# Patient Record
Sex: MALE | Race: WHITE | NOT HISPANIC OR LATINO | Employment: UNEMPLOYED | URBAN - METROPOLITAN AREA
[De-identification: names, ages, dates, MRNs, and addresses within clinical notes are randomized per-mention and may not be internally consistent; named-entity substitution may affect disease eponyms.]

---

## 2020-03-13 ENCOUNTER — NURSE TRIAGE (OUTPATIENT)
Dept: ADMINISTRATIVE | Facility: CLINIC | Age: 60
End: 2020-03-13

## 2020-03-13 RX ORDER — FAMOTIDINE 20 MG/1
20 TABLET, FILM COATED ORAL 2 TIMES DAILY
COMMUNITY
Start: 2020-03-10 | End: 2020-03-16

## 2020-03-14 NOTE — TELEPHONE ENCOUNTER
"    Reason for Disposition   Taking Coumadin (warfarin) or other strong blood thinner, or known bleeding disorder (e.g., thrombocytopenia)    Additional Information   Negative: Shock suspected (e.g., cold/pale/clammy skin, too weak to stand, low BP, rapid pulse)   Negative: Passed out (i.e., lost consciousness, collapsed and was not responding)   Negative: Difficult to awaken or acting confused (e.g., disoriented, slurred speech)   Negative: [1] Vomiting AND [2] contains red blood or black ("coffee ground") material  (Exception: few red streaks in vomit that only happened once)   Negative: Sounds like a life-threatening emergency to the triager   Negative: SEVERE rectal bleeding (large blood clots; on and off, or constant bleeding)   Negative: SEVERE dizziness (e.g., unable to stand, requires support to walk, feels like passing out now)   Negative: [1] MODERATE rectal bleeding (small blood clots, passing blood without stool, or toilet water turns red) AND [2] more than once a day   Negative: Pale skin (pallor) of new onset or worsening   Negative: Tarry or jet black-colored stool (not dark green)   Negative: [1] Constant abdominal pain AND [2] present > 2 hours   Negative: Rectal foreign body (i.e., now or within past week;  inserted or swallowed)   Negative: High-risk adult (e.g., prior surgery on aorta, abdominal aortic aneurysm)    Protocols used: RECTAL BLEEDING-A-AH      Patient describes mild bleeding from around the anus. His fiance looked at the area and states she doesn't see anything. He states he feels a 'stinging sensation when he wipes". Discussed the possibility he may be dealing with an anal fissure, but because he takes blood thinners, I'd have to send him in to the ED for evaluation. He verbalized understanding.   "

## 2020-03-17 ENCOUNTER — ANESTHESIA (OUTPATIENT)
Dept: CARDIOLOGY | Facility: HOSPITAL | Age: 60
End: 2020-03-17
Payer: MEDICAID

## 2020-03-17 ENCOUNTER — HOSPITAL ENCOUNTER (OUTPATIENT)
Facility: HOSPITAL | Age: 60
Discharge: HOME | End: 2020-03-17
Attending: INTERNAL MEDICINE | Admitting: INTERNAL MEDICINE
Payer: MEDICAID

## 2020-03-17 ENCOUNTER — CLINICAL SUPPORT (OUTPATIENT)
Dept: CARDIOLOGY | Facility: HOSPITAL | Age: 60
End: 2020-03-17
Attending: INTERNAL MEDICINE
Payer: MEDICAID

## 2020-03-17 ENCOUNTER — ANESTHESIA EVENT (OUTPATIENT)
Dept: CARDIOLOGY | Facility: HOSPITAL | Age: 60
End: 2020-03-17
Payer: MEDICAID

## 2020-03-17 VITALS
HEART RATE: 67 BPM | HEIGHT: 68 IN | DIASTOLIC BLOOD PRESSURE: 72 MMHG | SYSTOLIC BLOOD PRESSURE: 151 MMHG | BODY MASS INDEX: 26.52 KG/M2 | WEIGHT: 175 LBS | RESPIRATION RATE: 20 BRPM | OXYGEN SATURATION: 97 % | TEMPERATURE: 98 F

## 2020-03-17 DIAGNOSIS — Z01.812 PRE-PROCEDURE LAB EXAM: ICD-10-CM

## 2020-03-17 DIAGNOSIS — I74.10 AORTIC THROMBUS: ICD-10-CM

## 2020-03-17 DIAGNOSIS — Q25.49 OTHER CONGENITAL MALFORMATIONS OF AORTA: ICD-10-CM

## 2020-03-17 LAB
ANION GAP SERPL CALC-SCNC: 12 MMOL/L (ref 8–16)
APTT PPP: 32.9 SEC (ref 23.6–33.3)
BASOPHILS # BLD AUTO: 0.07 K/UL (ref 0–0.2)
BASOPHILS NFR BLD: 1 % (ref 0–1.9)
BUN SERPL-MCNC: 22 MG/DL (ref 6–20)
CALCIUM SERPL-MCNC: 9.4 MG/DL (ref 8.7–10.5)
CHLORIDE SERPL-SCNC: 101 MMOL/L (ref 95–110)
CO2 SERPL-SCNC: 25 MMOL/L (ref 23–29)
CREAT SERPL-MCNC: 0.9 MG/DL (ref 0.5–1.4)
DIFFERENTIAL METHOD: ABNORMAL
EOSINOPHIL # BLD AUTO: 0.3 K/UL (ref 0–0.5)
EOSINOPHIL NFR BLD: 3.6 % (ref 0–8)
ERYTHROCYTE [DISTWIDTH] IN BLOOD BY AUTOMATED COUNT: 12.2 % (ref 11.5–14.5)
EST. GFR  (AFRICAN AMERICAN): >60 ML/MIN/1.73 M^2
EST. GFR  (NON AFRICAN AMERICAN): >60 ML/MIN/1.73 M^2
GLUCOSE SERPL-MCNC: 112 MG/DL (ref 70–110)
HCT VFR BLD AUTO: 38.4 % (ref 40–54)
HGB BLD-MCNC: 12.9 G/DL (ref 14–18)
IMM GRANULOCYTES # BLD AUTO: 0.02 K/UL (ref 0–0.04)
IMM GRANULOCYTES NFR BLD AUTO: 0.3 % (ref 0–0.5)
INR PPP: 1.3
LYMPHOCYTES # BLD AUTO: 1.9 K/UL (ref 1–4.8)
LYMPHOCYTES NFR BLD: 25.7 % (ref 18–48)
MCH RBC QN AUTO: 31.9 PG (ref 27–31)
MCHC RBC AUTO-ENTMCNC: 33.6 G/DL (ref 32–36)
MCV RBC AUTO: 95 FL (ref 82–98)
MONOCYTES # BLD AUTO: 0.9 K/UL (ref 0.3–1)
MONOCYTES NFR BLD: 12.2 % (ref 4–15)
NEUTROPHILS # BLD AUTO: 4.2 K/UL (ref 1.8–7.7)
NEUTROPHILS NFR BLD: 57.2 % (ref 38–73)
NRBC BLD-RTO: 0 /100 WBC
PLATELET # BLD AUTO: 250 K/UL (ref 150–350)
PMV BLD AUTO: 10 FL (ref 9.2–12.9)
POTASSIUM SERPL-SCNC: 3.9 MMOL/L (ref 3.5–5.1)
PROTHROMBIN TIME: 15.6 SEC (ref 10.6–14.8)
RBC # BLD AUTO: 4.04 M/UL (ref 4.6–6.2)
SODIUM SERPL-SCNC: 138 MMOL/L (ref 136–145)
WBC # BLD AUTO: 7.32 K/UL (ref 3.9–12.7)

## 2020-03-17 PROCEDURE — 63600175 PHARM REV CODE 636 W HCPCS: Performed by: NURSE ANESTHETIST, CERTIFIED REGISTERED

## 2020-03-17 PROCEDURE — 85730 THROMBOPLASTIN TIME PARTIAL: CPT

## 2020-03-17 PROCEDURE — 93325 DOPPLER ECHO COLOR FLOW MAPG: CPT

## 2020-03-17 PROCEDURE — 80048 BASIC METABOLIC PNL TOTAL CA: CPT

## 2020-03-17 PROCEDURE — 01922 ANES N-INVAS IMG/RADJ THER: CPT | Performed by: INTERNAL MEDICINE

## 2020-03-17 PROCEDURE — 85610 PROTHROMBIN TIME: CPT

## 2020-03-17 PROCEDURE — 37000008 HC ANESTHESIA 1ST 15 MINUTES: Performed by: INTERNAL MEDICINE

## 2020-03-17 PROCEDURE — 37000009 HC ANESTHESIA EA ADD 15 MINS: Performed by: INTERNAL MEDICINE

## 2020-03-17 PROCEDURE — 27202103: Performed by: STUDENT IN AN ORGANIZED HEALTH CARE EDUCATION/TRAINING PROGRAM

## 2020-03-17 PROCEDURE — 93005 ELECTROCARDIOGRAM TRACING: CPT | Mod: 59 | Performed by: INTERNAL MEDICINE

## 2020-03-17 PROCEDURE — 27000675 HC TUBING MICRODRIP: Performed by: STUDENT IN AN ORGANIZED HEALTH CARE EDUCATION/TRAINING PROGRAM

## 2020-03-17 PROCEDURE — 85025 COMPLETE CBC W/AUTO DIFF WBC: CPT

## 2020-03-17 RX ORDER — SODIUM CHLORIDE 9 MG/ML
INJECTION, SOLUTION INTRAVENOUS CONTINUOUS PRN
Status: DISCONTINUED | OUTPATIENT
Start: 2020-03-17 | End: 2020-03-17

## 2020-03-17 RX ORDER — PROPOFOL 10 MG/ML
VIAL (ML) INTRAVENOUS
Status: DISCONTINUED | OUTPATIENT
Start: 2020-03-17 | End: 2020-03-17

## 2020-03-17 RX ADMIN — PROPOFOL 100 MG: 10 INJECTION, EMULSION INTRAVENOUS at 08:03

## 2020-03-17 RX ADMIN — PROPOFOL 50 MG: 10 INJECTION, EMULSION INTRAVENOUS at 08:03

## 2020-03-17 RX ADMIN — SODIUM CHLORIDE: 9 INJECTION, SOLUTION INTRAVENOUS at 08:03

## 2020-03-17 NOTE — ANESTHESIA POSTPROCEDURE EVALUATION
Anesthesia Post Evaluation    Patient: Louie Wahl    Procedure(s) Performed: Procedure(s) (LRB):  ECHOCARDIOGRAM,TRANSESOPHAGEAL (N/A)    Final Anesthesia Type: MAC    Patient location during evaluation: [facility]  Patient participation: Yes- Able to Participate  Level of consciousness: awake and alert  Post-procedure vital signs: reviewed and stable  Pain management: adequate  Airway patency: patent    PONV status at discharge: No PONV  Anesthetic complications: no      Cardiovascular status: hemodynamically stable  Respiratory status: unassisted, spontaneous ventilation and room air  Hydration status: euvolemic  Follow-up not needed.          Vitals Value Taken Time   BP 99/48 3/17/2020  9:00 AM   Temp  3/17/2020  9:04 AM   Pulse 65 3/17/2020  9:01 AM   Resp 25 3/17/2020  9:01 AM   SpO2 98 % 3/17/2020  9:01 AM   Vitals shown include unvalidated device data.      No case tracking events are documented in the log.      Pain/Brent Score: Brent Score: 9 (3/17/2020  8:57 AM)

## 2020-03-17 NOTE — DISCHARGE INSTRUCTIONS
DIONISIO Discharge Instructions:   Start with soft foods, once you can tolerate soft foods easily, you may begin eating solid foods.    You can not drive for 24 hours    Call your doctor immediately if:   You have fever or chills   You taste blood in your mouth   You have severe sore throat   You have difficulty swallowing   You have questions or concerns about your condition or care.

## 2020-03-17 NOTE — TRANSFER OF CARE
"Anesthesia Transfer of Care Note    Patient: Louie Wahl    Procedure(s) Performed: Procedure(s) (LRB):  ECHOCARDIOGRAM,TRANSESOPHAGEAL (N/A)    Patient location: PACU    Anesthesia Type: general    Transport from OR: Transported from OR on room air with adequate spontaneous ventilation    Post pain: adequate analgesia    Post assessment: no apparent anesthetic complications    Post vital signs: stable    Level of consciousness: awake and alert    Nausea/Vomiting: no nausea/vomiting    Complications: none    Transfer of care protocol was followed      Last vitals:   Visit Vitals  Ht 5' 8" (1.727 m)   Wt 79.4 kg (175 lb)   BMI 26.61 kg/m²     "

## 2020-03-17 NOTE — PLAN OF CARE
Discharge instructions given to pt and wife verbalized understanding all questions answered to pt satisfaction.no co pain or sob tolerating cardiac diet well , swallowing without difficulty

## 2020-03-17 NOTE — ANESTHESIA PREPROCEDURE EVALUATION
03/17/2020  Louie Wahl is a 59 y.o., male.    There is no problem list on file for this patient.      Past Surgical History:   Procedure Laterality Date    TRANSESOPHAGEAL ECHOCARDIOGRAPHY          Tobacco Use:  The patient  reports that he has never smoked. He has never used smokeless tobacco.     No results found for this or any previous visit.          Lab Results   Component Value Date    WBC 7.32 03/17/2020    HGB 12.9 (L) 03/17/2020    HCT 38.4 (L) 03/17/2020    MCV 95 03/17/2020     03/17/2020     BMP  Lab Results   Component Value Date     03/17/2020    K 3.9 03/17/2020     03/17/2020    CO2 25 03/17/2020    BUN 22 (H) 03/17/2020    CREATININE 0.9 03/17/2020    CALCIUM 9.4 03/17/2020    ANIONGAP 12 03/17/2020    ESTGFRAFRICA >60.0 03/17/2020    EGFRNONAA >60.0 03/17/2020             Anesthesia Evaluation    I have reviewed the Patient Summary Reports.     I have reviewed the Medications.     Review of Systems  Anesthesia Hx:  No problems with previous Anesthesia Denies Hx of Anesthetic complications  Denies Family Hx of Anesthesia complications.   Denies Personal Hx of Anesthesia complications.   Social:  Non-Smoker    Hematology/Oncology:  Hematology Normal   Oncology Normal     EENT/Dental:EENT/Dental Normal   Cardiovascular:   Hypertension CAD   Distal aortic arch thrombus   Pulmonary:  Pulmonary Normal    Renal/:  Renal/ Normal     Hepatic/GI:  Hepatic/GI Normal    Musculoskeletal:  Musculoskeletal Normal    Neurological:  Neurology Normal    Endocrine:  Endocrine Normal    Psych:  Psychiatric Normal           Physical Exam  General:  Well nourished    Airway/Jaw/Neck:  Airway Findings: Mouth Opening: Normal Tongue: Normal  General Airway Assessment: Adult  Mallampati: II  TM Distance: Normal, at least 6 cm  Jaw/Neck Findings:  Neck ROM: Normal ROM       Dental:  Dental Findings: Upper Dentures, Lower Dentures   Chest/Lungs:  Chest/Lungs Findings: Clear to auscultation, Normal Respiratory Rate     Heart/Vascular:  Heart Findings: Rate: Normal  Rhythm: Regular Rhythm  Sounds: Normal        Mental Status:  Mental Status Findings:  Alert and Oriented         Anesthesia Plan  Type of Anesthesia, risks & benefits discussed:  Anesthesia Type:  MAC  Patient's Preference:   Intra-op Monitoring Plan: standard ASA monitors  Intra-op Monitoring Plan Comments:   Post Op Pain Control Plan:   Post Op Pain Control Plan Comments:   Induction:    Beta Blocker:         Informed Consent: Patient understands risks and agrees with Anesthesia plan.  Questions answered. Anesthesia consent signed with patient.  ASA Score: 2     Day of Surgery Review of History & Physical:            Ready For Surgery From Anesthesia Perspective.

## 2020-09-21 ENCOUNTER — TELEPHONE (OUTPATIENT)
Dept: CARDIOLOGY | Facility: CLINIC | Age: 60
End: 2020-09-21

## 2020-09-21 NOTE — TELEPHONE ENCOUNTER
----- Message from Wen Mendes sent at 9/21/2020 11:38 AM CDT -----  Regarding: Labs  Pt called to see if he need to complete labs for his visit on 09/22/2020. Please advise    Callback: [phone]

## 2020-09-21 NOTE — TELEPHONE ENCOUNTER
Patient does not have any lab orders. Patient did say he had labs done with [facility] a few months ago.

## 2020-09-22 ENCOUNTER — OFFICE VISIT (OUTPATIENT)
Dept: CARDIOLOGY | Facility: CLINIC | Age: 60
End: 2020-09-22
Payer: MEDICAID

## 2020-09-22 VITALS
HEART RATE: 68 BPM | RESPIRATION RATE: 16 BRPM | OXYGEN SATURATION: 98 % | BODY MASS INDEX: 27.74 KG/M2 | SYSTOLIC BLOOD PRESSURE: 142 MMHG | HEIGHT: 68 IN | DIASTOLIC BLOOD PRESSURE: 80 MMHG | WEIGHT: 183 LBS

## 2020-09-22 DIAGNOSIS — E78.5 DYSLIPIDEMIA: ICD-10-CM

## 2020-09-22 DIAGNOSIS — I10 ESSENTIAL HYPERTENSION: ICD-10-CM

## 2020-09-22 DIAGNOSIS — R94.39 ABNORMAL STRESS TEST: Primary | ICD-10-CM

## 2020-09-22 DIAGNOSIS — I71.40 ABDOMINAL AORTIC ANEURYSM (AAA) WITHOUT RUPTURE: ICD-10-CM

## 2020-09-22 DIAGNOSIS — I74.10 THROMBUS OF AORTA: ICD-10-CM

## 2020-09-22 DIAGNOSIS — I25.118 CORONARY ARTERY DISEASE OF NATIVE ARTERY OF NATIVE HEART WITH STABLE ANGINA PECTORIS: ICD-10-CM

## 2020-09-22 PROCEDURE — 99214 PR OFFICE/OUTPT VISIT, EST, LEVL IV, 30-39 MIN: ICD-10-PCS | Mod: S$GLB,,, | Performed by: INTERNAL MEDICINE

## 2020-09-22 PROCEDURE — 99214 OFFICE O/P EST MOD 30 MIN: CPT | Mod: S$GLB,,, | Performed by: INTERNAL MEDICINE

## 2020-09-22 RX ORDER — SODIUM CHLORIDE 9 MG/ML
INJECTION, SOLUTION INTRAVENOUS CONTINUOUS
Status: CANCELLED | OUTPATIENT
Start: 2020-09-22

## 2020-09-22 RX ORDER — DIPHENHYDRAMINE HCL 25 MG
25 CAPSULE ORAL ONCE
Status: CANCELLED | OUTPATIENT
Start: 2020-09-22 | End: 2020-09-22

## 2020-09-22 RX ORDER — PANTOPRAZOLE SODIUM 40 MG/1
40 TABLET, DELAYED RELEASE ORAL DAILY
COMMUNITY
End: 2021-03-23

## 2020-09-22 RX ORDER — ASPIRIN 81 MG/1
325 TABLET ORAL ONCE
Status: CANCELLED | OUTPATIENT
Start: 2020-09-22 | End: 2020-09-22

## 2020-09-22 NOTE — PROGRESS NOTES
"Subjective:    Patient ID:  Louie Wahl is a 60 y.o. male who presents for {Misc; evaluation/follow-up:59584::"follow-up"} of Hypertension and Palpitations      HPI      Current Outpatient Medications   Medication Instructions    aspirin 81 mg, Oral, Daily    atorvastatin (LIPITOR) 80 mg, Oral, Daily    clopidogreL (PLAVIX) 75 mg, Oral, Daily    ELIQUIS 5 mg Tab TAKE 1 TABLET BY MOUTH TWICE A DAY    isosorbide mononitrate (IMDUR) 30 MG 24 hr tablet TAKE 1 TABLET BY MOUTH EVERY DAY    metoprolol succinate (TOPROL-XL) 50 mg, Oral, Daily    multivit-minerals/folic acid (SPECTRAVITE ADULT ORAL) Oral    omega-3 acid ethyl esters (LOVAZA) 2 g, Oral, Daily    pantoprazole (PROTONIX) 40 mg, Oral, Daily        ROS     Objective:     Vitals:    09/22/20 1007   BP: (!) 142/80   BP Location: Left arm   Patient Position: Sitting   BP Method: Medium (Manual)   Pulse: 68   Resp: 16   SpO2: 98%   Weight: 83 kg (183 lb)   Height: 5' 8" (1.727 m)       Physical Exam   No results found for this or any previous visit.   Results for orders placed during the hospital encounter of 03/17/20   Intra-Procedure Documentation    Narrative DIONISIO performed in the Invasive Lab    - See Procedure Log link below for nursing documentation    - See DIONISIO order on Card Proc Tab for physician findings           Assessment:       Problem List Items Addressed This Visit        Cardiac/Vascular    Coronary artery disease of native artery of native heart with stable angina pectoris    Overview     Reportedly had three-vessel disease on a prior angiogram done a couple of years ago.  Currently with angina. Lexiscan positive for ST segment changes with chest pains. Lasted for 30 minutes         Abdominal aortic aneurysm (AAA) without rupture    Essential hypertension    Dyslipidemia       Hematology    Thrombus of aorta    Overview     Distal Aortic arch thrombus. Likely on a plaque. Diagnosed by DIONISIO. Very mobile.  Resolved on repeat transesophageal " echocardiogram.           Other Visit Diagnoses     Abnormal stress test    -  Primary            Plan:

## 2020-09-22 NOTE — PROGRESS NOTES
"Subjective:    Patient ID:  Louie Wahl is a 60 y.o. male who presents for {Misc; evaluation/follow-up:38639::"follow-up"} of Hypertension and Palpitations      HPI      Current Outpatient Medications   Medication Instructions    aspirin 81 mg, Oral, Daily    atorvastatin (LIPITOR) 80 mg, Oral, Daily    clopidogreL (PLAVIX) 75 mg, Oral, Daily    ELIQUIS 5 mg Tab TAKE 1 TABLET BY MOUTH TWICE A DAY    isosorbide mononitrate (IMDUR) 30 MG 24 hr tablet TAKE 1 TABLET BY MOUTH EVERY DAY    metoprolol succinate (TOPROL-XL) 50 mg, Oral, Daily    multivit-minerals/folic acid (SPECTRAVITE ADULT ORAL) Oral    omega-3 acid ethyl esters (LOVAZA) 2 g, Oral, Daily    pantoprazole (PROTONIX) 40 mg, Oral, Daily        ROS     Objective:     Vitals:    09/22/20 1007   BP: (!) 142/80   BP Location: Left arm   Patient Position: Sitting   BP Method: Medium (Manual)   Pulse: 68   Resp: 16   SpO2: 98%   Weight: 83 kg (183 lb)   Height: 5' 8" (1.727 m)       Physical Exam   No results found for this or any previous visit.   Results for orders placed during the hospital encounter of 03/17/20   Intra-Procedure Documentation    Narrative DIONISIO performed in the Invasive Lab    - See Procedure Log link below for nursing documentation    - See DIONISIO order on Card Proc Tab for physician findings           Assessment:       Problem List Items Addressed This Visit     None            Plan:                     "

## 2020-09-22 NOTE — PROGRESS NOTES
Subjective:    Patient ID:  Louie Wahl is a 60 y.o. 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  Since last clinic visit he denies any hospitalizations or emergency room visits.  He has a reportedly having intermittent chest discomfort at rest that lasts 10-20 seconds and spontaneously resolves.  He also has angina symptoms with chest pain whenever he starts doing stuff like a weed eating.  He denies any shortness of breath with usual activities.  He denies any lower extremity edema.  He denies any symptoms suggestive of stroke or TIA.  He denies any bleeding issues.  He is not allergic to the IV dye.     Social History     Socioeconomic History    Marital status: Single     Spouse name: Not on file    Number of children: Not on file    Years of education: Not on file    Highest education level: Not on file   Occupational History    Not on file   Social Needs    Financial resource strain: Not on file    Food insecurity     Worry: Not on file     Inability: Not on file    Transportation needs     Medical: Not on file     Non-medical: Not on file   Tobacco Use    Smoking status: Never Smoker    Smokeless tobacco: Never Used   Substance and Sexual Activity    Alcohol use: Not Currently     Comment: Social    Drug use: Never    Sexual activity: Not Currently   Lifestyle    Physical activity     Days per week: Not on file     Minutes per session: Not on file    Stress: Not on file   Relationships    Social connections     Talks on phone: Not on file     Gets together: Not on file     Attends [employer] service: Not on file     Active member of club or organization: Not on file     Attends meetings of clubs or organizations: Not on file     Relationship status: Not on file   Other Topics Concern    Not on file   Social History Narrative    Not on file        Family History  "  Problem Relation Age of Onset    Heart failure Mother     Heart disease Mother     Heart disease Father     Heart failure Father           Current Outpatient Medications   Medication Instructions    aspirin 81 mg, Oral, Daily    atorvastatin (LIPITOR) 80 mg, Oral, Daily    clopidogreL (PLAVIX) 75 mg, Oral, Daily    ELIQUIS 5 mg Tab TAKE 1 TABLET BY MOUTH TWICE A DAY    isosorbide mononitrate (IMDUR) 30 MG 24 hr tablet TAKE 1 TABLET BY MOUTH EVERY DAY    metoprolol succinate (TOPROL-XL) 50 mg, Oral, Daily    multivit-minerals/folic acid (SPECTRAVITE ADULT ORAL) Oral    omega-3 acid ethyl esters (LOVAZA) 2 g, Oral, Daily    pantoprazole (PROTONIX) 40 mg, Oral, Daily        Review of Systems   Constitution: Positive for malaise/fatigue. Negative for decreased appetite, fever, weight gain and weight loss.   HENT: Negative for ear pain and sore throat.    Eyes: Negative for double vision and pain.   Cardiovascular: Positive for chest pain. Negative for irregular heartbeat.   Respiratory: Negative for cough and hemoptysis.    Endocrine: Negative for heat intolerance and polydipsia.   Hematologic/Lymphatic: Negative for bleeding problem.   Skin: Negative for rash.   Musculoskeletal: Negative for stiffness.   Gastrointestinal: Negative for abdominal pain, jaundice and vomiting.   Genitourinary: Negative for dysuria.   Neurological: Negative for seizures and tremors.   Psychiatric/Behavioral: Negative for altered mental status, hallucinations and suicidal ideas.        Objective:     Vitals:    09/22/20 1007   BP: (!) 142/80   BP Location: Left arm   Patient Position: Sitting   BP Method: Medium (Manual)   Pulse: 68   Resp: 16   SpO2: 98%   Weight: 83 kg (183 lb)   Height: 5' 8" (1.727 m)       Physical Exam   Constitutional: He is oriented to person, place, and time. He appears well-developed and well-nourished.   HENT:   Head: Normocephalic and atraumatic.   Eyes: Pupils are equal, round, and reactive to " light. Conjunctivae are normal.   Neck: Neck supple. No JVD present.   Cardiovascular: Normal rate, regular rhythm, S1 normal, S2 normal and normal heart sounds. Exam reveals no gallop and no friction rub.   No murmur heard.  Pulses:       Carotid pulses are 2+ on the right side and 2+ on the left side.       Posterior tibial pulses are 2+ on the right side and 2+ on the left side.   Pulmonary/Chest: No stridor. No respiratory distress. He has no wheezes. He has no rales.   Abdominal: Soft. He exhibits no distension. There is no abdominal tenderness.   Musculoskeletal:         General: No edema.   Neurological: He is alert and oriented to person, place, and time.   Skin: Skin is warm and dry. No burn and no rash noted. No cyanosis. Nails show no clubbing.   Psychiatric: His behavior is normal. He expresses no suicidal ideation.     No results found for this or any previous visit.  Results for orders placed during the hospital encounter of 03/17/20   Intra-Procedure Documentation    Narrative DIONISIO performed in the Invasive Lab    - See Procedure Log link below for nursing documentation    - See DIONISIO order on Card Proc Tab for physician findings           Assessment:       Problem List Items Addressed This Visit        Cardiac/Vascular    Coronary artery disease of native artery of native heart with stable angina pectoris    Overview     Reportedly had three-vessel disease on a prior angiogram done a couple of years ago.  Currently with angina. Lexiscan positive for ST segment changes with chest pains. Lasted for 30 minutes         Current Assessment & Plan     Currently with chest pain at times with rest.     In view of the ongoing symptoms, sometimes chest pain at rest, I recommended left heart catheterization.  At this time he is agreeable.  I explained the indications, risks, benefits as well as alternatives to the procedure in layman terms.  Patient states he understands and wishes to proceed with the procedure.   We will attempt via the right radial access.  Was advised to hold Eliquis 3 days prior to procedure date.          Essential hypertension    Current Assessment & Plan       Continue current management.          Dyslipidemia    Current Assessment & Plan     Repeat lipid profile in 3 months.          Abnormal stress test - Primary    Current Assessment & Plan     Currently with chest pain.     Schedule [facility] [place].          RESOLVED: Abdominal aortic aneurysm (AAA) without rupture       Hematology    Thrombus of aorta    Overview     Distal Aortic arch thrombus. Likely on a plaque. Diagnosed by DIONISIO. Very mobile.  Resolved on repeat transesophageal echocardiogram with no mobile component noted on 3/17/2020.                  Plan:       Return to clinic in 2-3 weeks or sooner if needed.

## 2020-09-22 NOTE — ASSESSMENT & PLAN NOTE
Currently with chest pain at times with rest.     In view of the ongoing symptoms, sometimes chest pain at rest, I recommended left heart catheterization.  At this time he is agreeable.  I explained the indications, risks, benefits as well as alternatives to the procedure in layman terms.  Patient states he understands and wishes to proceed with the procedure.  We will attempt via the right radial access.  Was advised to hold Eliquis 3 days prior to procedure date.

## 2020-09-25 ENCOUNTER — HOSPITAL ENCOUNTER (OUTPATIENT)
Dept: PREADMISSION TESTING | Facility: HOSPITAL | Age: 60
Discharge: HOME | End: 2020-09-25
Attending: INTERNAL MEDICINE
Payer: MEDICAID

## 2020-09-25 VITALS — BODY MASS INDEX: 27.28 KG/M2 | WEIGHT: 180 LBS | HEIGHT: 68 IN

## 2020-09-25 DIAGNOSIS — Z01.810 PRE-PROCEDURAL CARDIOVASCULAR EXAMINATION: Primary | ICD-10-CM

## 2020-09-25 LAB
ANION GAP SERPL CALC-SCNC: 12 MMOL/L (ref 8–16)
APTT PPP: 29.8 SEC (ref 23.6–33.3)
BASOPHILS # BLD AUTO: 0.05 K/UL (ref 0–0.2)
BASOPHILS NFR BLD: 0.8 % (ref 0–1.9)
BUN SERPL-MCNC: 19 MG/DL (ref 6–20)
CALCIUM SERPL-MCNC: 9.6 MG/DL (ref 8.7–10.5)
CHLORIDE SERPL-SCNC: 103 MMOL/L (ref 95–110)
CO2 SERPL-SCNC: 25 MMOL/L (ref 23–29)
CREAT SERPL-MCNC: 1 MG/DL (ref 0.5–1.4)
DIFFERENTIAL METHOD: ABNORMAL
EOSINOPHIL # BLD AUTO: 0.3 K/UL (ref 0–0.5)
EOSINOPHIL NFR BLD: 3.8 % (ref 0–8)
ERYTHROCYTE [DISTWIDTH] IN BLOOD BY AUTOMATED COUNT: 12.3 % (ref 11.5–14.5)
EST. GFR  (AFRICAN AMERICAN): >60 ML/MIN/1.73 M^2
EST. GFR  (NON AFRICAN AMERICAN): >60 ML/MIN/1.73 M^2
GLUCOSE SERPL-MCNC: 175 MG/DL (ref 70–110)
HCT VFR BLD AUTO: 40.3 % (ref 40–54)
HGB BLD-MCNC: 13.7 G/DL (ref 14–18)
IMM GRANULOCYTES # BLD AUTO: 0.02 K/UL (ref 0–0.04)
IMM GRANULOCYTES NFR BLD AUTO: 0.3 % (ref 0–0.5)
INR PPP: 1.3
LYMPHOCYTES # BLD AUTO: 1.7 K/UL (ref 1–4.8)
LYMPHOCYTES NFR BLD: 25.8 % (ref 18–48)
MAGNESIUM SERPL-MCNC: 1.9 MG/DL (ref 1.6–2.6)
MCH RBC QN AUTO: 31.5 PG (ref 27–31)
MCHC RBC AUTO-ENTMCNC: 34 G/DL (ref 32–36)
MCV RBC AUTO: 93 FL (ref 82–98)
MONOCYTES # BLD AUTO: 0.5 K/UL (ref 0.3–1)
MONOCYTES NFR BLD: 7.6 % (ref 4–15)
NEUTROPHILS # BLD AUTO: 4 K/UL (ref 1.8–7.7)
NEUTROPHILS NFR BLD: 61.7 % (ref 38–73)
NRBC BLD-RTO: 0 /100 WBC
PLATELET # BLD AUTO: 240 K/UL (ref 150–350)
PMV BLD AUTO: 10.7 FL (ref 9.2–12.9)
POTASSIUM SERPL-SCNC: 4.2 MMOL/L (ref 3.5–5.1)
PROTHROMBIN TIME: 15.1 SEC (ref 10.6–14.8)
RBC # BLD AUTO: 4.35 M/UL (ref 4.6–6.2)
SODIUM SERPL-SCNC: 140 MMOL/L (ref 136–145)
WBC # BLD AUTO: 6.55 K/UL (ref 3.9–12.7)

## 2020-09-25 PROCEDURE — 93010 ELECTROCARDIOGRAM REPORT: CPT | Mod: ,,, | Performed by: INTERNAL MEDICINE

## 2020-09-25 PROCEDURE — 85730 THROMBOPLASTIN TIME PARTIAL: CPT

## 2020-09-25 PROCEDURE — 36415 COLL VENOUS BLD VENIPUNCTURE: CPT

## 2020-09-25 PROCEDURE — 85610 PROTHROMBIN TIME: CPT

## 2020-09-25 PROCEDURE — U0003 INFECTIOUS AGENT DETECTION BY NUCLEIC ACID (DNA OR RNA); SEVERE ACUTE RESPIRATORY SYNDROME CORONAVIRUS 2 (SARS-COV-2) (CORONAVIRUS DISEASE [COVID-19]), AMPLIFIED PROBE TECHNIQUE, MAKING USE OF HIGH THROUGHPUT TECHNOLOGIES AS DESCRIBED BY CMS-2020-01-R: HCPCS

## 2020-09-25 PROCEDURE — 80048 BASIC METABOLIC PNL TOTAL CA: CPT

## 2020-09-25 PROCEDURE — 93010 EKG 12-LEAD: ICD-10-PCS | Mod: ,,, | Performed by: INTERNAL MEDICINE

## 2020-09-25 PROCEDURE — 93005 ELECTROCARDIOGRAM TRACING: CPT | Performed by: INTERNAL MEDICINE

## 2020-09-25 PROCEDURE — 83735 ASSAY OF MAGNESIUM: CPT

## 2020-09-25 PROCEDURE — 85025 COMPLETE CBC W/AUTO DIFF WBC: CPT

## 2020-09-25 NOTE — DISCHARGE INSTRUCTIONS
 Nothing to eat or drink after midnight the night before your procedure.   Do not take any medications the morning of your procedure   Bring all your medications with you in the original pill bottles from pharmacy.   If you take blood thinners, ask your doctor if you should stop taking them   Do your Hibiclens wash the night before and morning of your procedure.   Make arrangements for someone you know to drive you home after your procedure. Taxi and Uber are not acceptable.

## 2020-09-26 LAB — SARS-COV-2 RNA RESP QL NAA+PROBE: NOT DETECTED

## 2020-09-28 ENCOUNTER — CLINICAL SUPPORT (OUTPATIENT)
Dept: CARDIOLOGY | Facility: HOSPITAL | Age: 60
DRG: 287 | End: 2020-09-28
Attending: INTERNAL MEDICINE
Payer: MEDICAID

## 2020-09-28 ENCOUNTER — TELEPHONE (OUTPATIENT)
Dept: CARDIOLOGY | Facility: CLINIC | Age: 60
End: 2020-09-28

## 2020-09-28 ENCOUNTER — HOSPITAL ENCOUNTER (INPATIENT)
Facility: HOSPITAL | Age: 60
LOS: 2 days | Discharge: HOME | DRG: 287 | End: 2020-09-30
Attending: INTERNAL MEDICINE | Admitting: HOSPITALIST
Payer: MEDICAID

## 2020-09-28 VITALS — BODY MASS INDEX: 27.28 KG/M2 | HEIGHT: 68 IN | WEIGHT: 180 LBS

## 2020-09-28 DIAGNOSIS — I25.118 CORONARY ARTERY DISEASE OF NATIVE ARTERY OF NATIVE HEART WITH STABLE ANGINA PECTORIS: ICD-10-CM

## 2020-09-28 DIAGNOSIS — I25.10 CORONARY ARTERY DISEASE, ANGINA PRESENCE UNSPECIFIED, UNSPECIFIED VESSEL OR LESION TYPE, UNSPECIFIED WHETHER NATIVE OR TRANSPLANTED HEART: ICD-10-CM

## 2020-09-28 DIAGNOSIS — I25.10 CAD (CORONARY ARTERY DISEASE): ICD-10-CM

## 2020-09-28 DIAGNOSIS — R07.9 CHEST PAIN: ICD-10-CM

## 2020-09-28 DIAGNOSIS — I74.10 THROMBUS OF AORTA: Primary | ICD-10-CM

## 2020-09-28 DIAGNOSIS — R94.39 ABNORMAL STRESS TEST: ICD-10-CM

## 2020-09-28 DIAGNOSIS — I74.10 THROMBUS OF AORTA: ICD-10-CM

## 2020-09-28 PROCEDURE — 93458 L HRT ARTERY/VENTRICLE ANGIO: CPT | Mod: 26,,, | Performed by: INTERNAL MEDICINE

## 2020-09-28 PROCEDURE — 93306 TTE W/DOPPLER COMPLETE: CPT

## 2020-09-28 PROCEDURE — 93458 L HRT ARTERY/VENTRICLE ANGIO: CPT | Performed by: INTERNAL MEDICINE

## 2020-09-28 PROCEDURE — 25000003 PHARM REV CODE 250: Performed by: HOSPITALIST

## 2020-09-28 PROCEDURE — 99152 PR MOD CONSCIOUS SEDATION, SAME PHYS, 5+ YRS, FIRST 15 MIN: ICD-10-PCS | Mod: ,,, | Performed by: INTERNAL MEDICINE

## 2020-09-28 PROCEDURE — 21400001 HC TELEMETRY ROOM

## 2020-09-28 PROCEDURE — C1887 CATHETER, GUIDING: HCPCS | Performed by: INTERNAL MEDICINE

## 2020-09-28 PROCEDURE — C1769 GUIDE WIRE: HCPCS | Performed by: INTERNAL MEDICINE

## 2020-09-28 PROCEDURE — C1894 INTRO/SHEATH, NON-LASER: HCPCS | Performed by: INTERNAL MEDICINE

## 2020-09-28 PROCEDURE — 25000003 PHARM REV CODE 250: Performed by: INTERNAL MEDICINE

## 2020-09-28 PROCEDURE — 99152 MOD SED SAME PHYS/QHP 5/>YRS: CPT | Mod: ,,, | Performed by: INTERNAL MEDICINE

## 2020-09-28 PROCEDURE — 93306 ECHO (CUPID ONLY): ICD-10-PCS | Mod: 26,,, | Performed by: INTERNAL MEDICINE

## 2020-09-28 PROCEDURE — 94761 N-INVAS EAR/PLS OXIMETRY MLT: CPT

## 2020-09-28 PROCEDURE — 99152 MOD SED SAME PHYS/QHP 5/>YRS: CPT | Performed by: INTERNAL MEDICINE

## 2020-09-28 PROCEDURE — 93458 PR CATH PLACE/CORON ANGIO, IMG SUPER/INTERP,W LEFT HEART VENTRICULOGRAPHY: ICD-10-PCS | Mod: 26,,, | Performed by: INTERNAL MEDICINE

## 2020-09-28 PROCEDURE — 99153 MOD SED SAME PHYS/QHP EA: CPT | Performed by: INTERNAL MEDICINE

## 2020-09-28 PROCEDURE — 93306 TTE W/DOPPLER COMPLETE: CPT | Mod: 26,,, | Performed by: INTERNAL MEDICINE

## 2020-09-28 PROCEDURE — 63600175 PHARM REV CODE 636 W HCPCS: Performed by: INTERNAL MEDICINE

## 2020-09-28 RX ORDER — POTASSIUM CHLORIDE 20 MEQ/1
20 TABLET, EXTENDED RELEASE ORAL
Status: DISCONTINUED | OUTPATIENT
Start: 2020-09-28 | End: 2020-09-30 | Stop reason: HOSPADM

## 2020-09-28 RX ORDER — POTASSIUM CHLORIDE 7.45 MG/ML
20 INJECTION INTRAVENOUS
Status: DISCONTINUED | OUTPATIENT
Start: 2020-09-28 | End: 2020-09-30 | Stop reason: HOSPADM

## 2020-09-28 RX ORDER — ACETAMINOPHEN 325 MG/1
650 TABLET ORAL EVERY 4 HOURS PRN
Status: DISCONTINUED | OUTPATIENT
Start: 2020-09-28 | End: 2020-09-30 | Stop reason: HOSPADM

## 2020-09-28 RX ORDER — MORPHINE SULFATE 10 MG/ML
2 INJECTION INTRAMUSCULAR; INTRAVENOUS; SUBCUTANEOUS EVERY 4 HOURS PRN
Status: DISCONTINUED | OUTPATIENT
Start: 2020-09-28 | End: 2020-09-28

## 2020-09-28 RX ORDER — ISOSORBIDE MONONITRATE 30 MG/1
30 TABLET, EXTENDED RELEASE ORAL DAILY
Status: DISCONTINUED | OUTPATIENT
Start: 2020-09-28 | End: 2020-09-28

## 2020-09-28 RX ORDER — MIDAZOLAM HYDROCHLORIDE 1 MG/ML
INJECTION INTRAMUSCULAR; INTRAVENOUS
Status: DISCONTINUED | OUTPATIENT
Start: 2020-09-28 | End: 2020-09-28 | Stop reason: HOSPADM

## 2020-09-28 RX ORDER — TALC
6 POWDER (GRAM) TOPICAL NIGHTLY PRN
Status: DISCONTINUED | OUTPATIENT
Start: 2020-09-28 | End: 2020-09-30 | Stop reason: HOSPADM

## 2020-09-28 RX ORDER — ONDANSETRON 4 MG/1
8 TABLET, ORALLY DISINTEGRATING ORAL EVERY 8 HOURS PRN
Status: DISCONTINUED | OUTPATIENT
Start: 2020-09-28 | End: 2020-09-30 | Stop reason: HOSPADM

## 2020-09-28 RX ORDER — MAGNESIUM SULFATE 1 G/100ML
1 INJECTION INTRAVENOUS
Status: DISCONTINUED | OUTPATIENT
Start: 2020-09-28 | End: 2020-09-30 | Stop reason: HOSPADM

## 2020-09-28 RX ORDER — METOPROLOL SUCCINATE 50 MG/1
50 TABLET, EXTENDED RELEASE ORAL DAILY
Status: DISCONTINUED | OUTPATIENT
Start: 2020-09-28 | End: 2020-09-28

## 2020-09-28 RX ORDER — POTASSIUM CHLORIDE 7.45 MG/ML
40 INJECTION INTRAVENOUS
Status: DISCONTINUED | OUTPATIENT
Start: 2020-09-28 | End: 2020-09-30 | Stop reason: HOSPADM

## 2020-09-28 RX ORDER — POLYETHYLENE GLYCOL 3350 17 G/17G
17 POWDER, FOR SOLUTION ORAL DAILY
Status: DISCONTINUED | OUTPATIENT
Start: 2020-09-28 | End: 2020-09-30 | Stop reason: HOSPADM

## 2020-09-28 RX ORDER — MAGNESIUM SULFATE HEPTAHYDRATE 40 MG/ML
2 INJECTION, SOLUTION INTRAVENOUS
Status: DISCONTINUED | OUTPATIENT
Start: 2020-09-28 | End: 2020-09-30 | Stop reason: HOSPADM

## 2020-09-28 RX ORDER — SODIUM CHLORIDE 9 MG/ML
INJECTION, SOLUTION INTRAVENOUS CONTINUOUS
Status: DISCONTINUED | OUTPATIENT
Start: 2020-09-28 | End: 2020-09-30 | Stop reason: HOSPADM

## 2020-09-28 RX ORDER — FENTANYL CITRATE 50 UG/ML
INJECTION, SOLUTION INTRAMUSCULAR; INTRAVENOUS
Status: DISCONTINUED | OUTPATIENT
Start: 2020-09-28 | End: 2020-09-28 | Stop reason: HOSPADM

## 2020-09-28 RX ORDER — NAPROXEN SODIUM 220 MG/1
81 TABLET, FILM COATED ORAL DAILY
Status: DISCONTINUED | OUTPATIENT
Start: 2020-09-28 | End: 2020-09-28

## 2020-09-28 RX ORDER — NAPROXEN SODIUM 220 MG/1
81 TABLET, FILM COATED ORAL DAILY
Status: DISCONTINUED | OUTPATIENT
Start: 2020-09-29 | End: 2020-09-30 | Stop reason: HOSPADM

## 2020-09-28 RX ORDER — ATORVASTATIN CALCIUM 40 MG/1
80 TABLET, FILM COATED ORAL NIGHTLY
Status: DISCONTINUED | OUTPATIENT
Start: 2020-09-28 | End: 2020-09-30 | Stop reason: HOSPADM

## 2020-09-28 RX ORDER — MORPHINE SULFATE 2 MG/ML
2 INJECTION, SOLUTION INTRAMUSCULAR; INTRAVENOUS EVERY 4 HOURS PRN
Status: DISCONTINUED | OUTPATIENT
Start: 2020-09-28 | End: 2020-09-30 | Stop reason: HOSPADM

## 2020-09-28 RX ORDER — LANOLIN ALCOHOL/MO/W.PET/CERES
800 CREAM (GRAM) TOPICAL
Status: DISCONTINUED | OUTPATIENT
Start: 2020-09-28 | End: 2020-09-30 | Stop reason: HOSPADM

## 2020-09-28 RX ORDER — POTASSIUM CHLORIDE 20 MEQ/1
40 TABLET, EXTENDED RELEASE ORAL
Status: DISCONTINUED | OUTPATIENT
Start: 2020-09-28 | End: 2020-09-30 | Stop reason: HOSPADM

## 2020-09-28 RX ORDER — ISOSORBIDE MONONITRATE 30 MG/1
30 TABLET, EXTENDED RELEASE ORAL DAILY
Status: DISCONTINUED | OUTPATIENT
Start: 2020-09-29 | End: 2020-09-30 | Stop reason: HOSPADM

## 2020-09-28 RX ORDER — DIPHENHYDRAMINE HCL 25 MG
25 CAPSULE ORAL ONCE
Status: COMPLETED | OUTPATIENT
Start: 2020-09-28 | End: 2020-09-28

## 2020-09-28 RX ORDER — LIDOCAINE HYDROCHLORIDE 10 MG/ML
INJECTION, SOLUTION EPIDURAL; INFILTRATION; INTRACAUDAL; PERINEURAL
Status: DISCONTINUED | OUTPATIENT
Start: 2020-09-28 | End: 2020-09-28 | Stop reason: HOSPADM

## 2020-09-28 RX ORDER — HYDROCODONE BITARTRATE AND ACETAMINOPHEN 5; 325 MG/1; MG/1
1 TABLET ORAL EVERY 6 HOURS PRN
Status: DISCONTINUED | OUTPATIENT
Start: 2020-09-28 | End: 2020-09-30 | Stop reason: HOSPADM

## 2020-09-28 RX ORDER — PANTOPRAZOLE SODIUM 40 MG/1
40 TABLET, DELAYED RELEASE ORAL DAILY
Status: DISCONTINUED | OUTPATIENT
Start: 2020-09-28 | End: 2020-09-30 | Stop reason: HOSPADM

## 2020-09-28 RX ORDER — ASPIRIN 325 MG
325 TABLET, DELAYED RELEASE (ENTERIC COATED) ORAL ONCE
Status: COMPLETED | OUTPATIENT
Start: 2020-09-28 | End: 2020-09-28

## 2020-09-28 RX ORDER — ONDANSETRON 2 MG/ML
4 INJECTION INTRAMUSCULAR; INTRAVENOUS EVERY 8 HOURS PRN
Status: DISCONTINUED | OUTPATIENT
Start: 2020-09-28 | End: 2020-09-30 | Stop reason: HOSPADM

## 2020-09-28 RX ORDER — HEPARIN SODIUM 1000 [USP'U]/ML
INJECTION, SOLUTION INTRAVENOUS; SUBCUTANEOUS
Status: DISCONTINUED | OUTPATIENT
Start: 2020-09-28 | End: 2020-09-28 | Stop reason: HOSPADM

## 2020-09-28 RX ORDER — NITROGLYCERIN 0.4 MG/1
0.4 TABLET SUBLINGUAL EVERY 5 MIN PRN
Status: DISCONTINUED | OUTPATIENT
Start: 2020-09-28 | End: 2020-09-30 | Stop reason: HOSPADM

## 2020-09-28 RX ORDER — MAGNESIUM SULFATE HEPTAHYDRATE 40 MG/ML
4 INJECTION, SOLUTION INTRAVENOUS
Status: DISCONTINUED | OUTPATIENT
Start: 2020-09-28 | End: 2020-09-30 | Stop reason: HOSPADM

## 2020-09-28 RX ORDER — METOPROLOL SUCCINATE 50 MG/1
50 TABLET, EXTENDED RELEASE ORAL DAILY
Status: DISCONTINUED | OUTPATIENT
Start: 2020-09-28 | End: 2020-09-30 | Stop reason: HOSPADM

## 2020-09-28 RX ORDER — SODIUM CHLORIDE 0.9 % (FLUSH) 0.9 %
10 SYRINGE (ML) INJECTION
Status: DISCONTINUED | OUTPATIENT
Start: 2020-09-28 | End: 2020-09-29 | Stop reason: SDUPTHER

## 2020-09-28 RX ADMIN — DIPHENHYDRAMINE HYDROCHLORIDE 25 MG: 25 CAPSULE ORAL at 06:09

## 2020-09-28 RX ADMIN — SODIUM CHLORIDE: 0.9 INJECTION, SOLUTION INTRAVENOUS at 06:09

## 2020-09-28 RX ADMIN — ATORVASTATIN CALCIUM 80 MG: 40 TABLET, FILM COATED ORAL at 09:09

## 2020-09-28 RX ADMIN — ASPIRIN 325 MG: 325 TABLET, COATED ORAL at 06:09

## 2020-09-28 NOTE — TELEPHONE ENCOUNTER
----- Message from Ghada Pineda sent at 9/28/2020 11:39 AM CDT -----  Regarding: Carotid Us  Please call madiah at [phone] she checking to see if patient had a Carotid US need to get results...

## 2020-09-28 NOTE — HPI
60-year-old with past medical history of hypertension, coronary artery disease, hyperlipidemia, aortic root thrombus came for outpatient scheduled angiogram found to have multivessel disease.  Hospital Medicine was called in to admit the patient due to need of CABG.  Upon my interview patient denies any symptoms except right radial discomfort.  Upon further asking it appears that patient has stable angina upon minimal exertion.  Last year patient had abnormal stress test however he was found to have aortic root thrombus and was placed on Eliquis.  Finally it appears the thrombus dissolved and he came for outpatient scheduled angiogram.  Otherwise denies any fever, chills, headache, dizziness, chest pain, palpitations, nausea, vomiting, bladder or bowel symptoms.  Denies any prior history of CVA or MI.  Significant family history of heart disease.

## 2020-09-28 NOTE — NURSING TRANSFER
Nursing Transfer Note      9/28/2020     Transfer To: 2536 from 1402    Transfer via wheelchair    Transfer with cardiac monitoring    Transported by Bobby Henderson RN

## 2020-09-28 NOTE — Clinical Note
Catheter is repositioned to the ostium   right coronary artery. Angiography performed of the right coronary arteries in multiple views. Angiography performed via power injection. Injection was 6 mL contrast at 3 mL/s. Power injection PSI was 300..

## 2020-09-28 NOTE — PROGRESS NOTES
Cardiac Rehab     Louie Wahl   [id number]   9/28/2020         Cardiac Rehab Phase Taught: Phase 1    Teaching Method: Verbal, Written    Handouts: Pre-Op CABG Booklet    Educational Videos: None    Understanding:  Knowledge indicated by feedback, Learning indicated by feedback and Verbalize understanding    Comments: discussed pre and postop cabg with pt/wife including sternal precautions, pain control, activity, s/s to report. Questions answered. Pt/wife voiced understanding. Will follow for further education. Spoke with bedside RN, requested IS be given to pt    Total Time Spent:25mins            Raine Camacho RN

## 2020-09-28 NOTE — INTERVAL H&P NOTE
The patient has been examined and the H&P has been reviewed:    I concur with the findings and no changes have occurred since H&P was written.    Surgery risks, benefits and alternative options discussed and understood by patient/family.          Active Hospital Problems    Diagnosis  POA    Abnormal stress test [R94.39]  Yes     Priority: Low      Resolved Hospital Problems   No resolved problems to display.

## 2020-09-28 NOTE — Clinical Note
Catheter is repositioned to the ostium   left main. Angiography performed of the left coronary arteries in multiple views. Angiography performed via power injection. Injection was 8 mL contrast at 4 mL/s. Power injection PSI was 400..

## 2020-09-28 NOTE — SUBJECTIVE & OBJECTIVE
Past Medical History:   Diagnosis Date    Atrial flutter     Coronary artery disease     Hyperlipidemia     Hypertension        Past Surgical History:   Procedure Laterality Date    TRANSESOPHAGEAL ECHOCARDIOGRAPHY         Review of patient's allergies indicates:   Allergen Reactions    Ace inhibitors Hives and Swelling       No current facility-administered medications on file prior to encounter.      Current Outpatient Medications on File Prior to Encounter   Medication Sig    aspirin 81 MG Chew Take 81 mg by mouth once daily.    atorvastatin (LIPITOR) 80 MG tablet Take 80 mg by mouth once daily.    clopidogrel (PLAVIX) 75 mg tablet Take 75 mg by mouth once daily.    ELIQUIS 5 mg Tab TAKE 1 TABLET BY MOUTH TWICE A DAY    isosorbide mononitrate (IMDUR) 30 MG 24 hr tablet TAKE 1 TABLET BY MOUTH EVERY DAY    metoprolol succinate (TOPROL-XL) 50 MG 24 hr tablet Take 50 mg by mouth. 50mg every morning, 25mg at night    multivit-minerals/folic acid (SPECTRAVITE ADULT ORAL) Take by mouth.    omega-3 acid ethyl esters (LOVAZA) 1 gram capsule Take 2 g by mouth once daily.     pantoprazole (PROTONIX) 40 MG tablet Take 40 mg by mouth once daily.     Family History     Problem Relation (Age of Onset)    Heart disease Mother, Father    Heart failure Mother, Father        Tobacco Use    Smoking status: Current Some Day Smoker    Smokeless tobacco: Never Used    Tobacco comment: 1 pack per month   Substance and Sexual Activity    Alcohol use: Not Currently     Comment: Social    Drug use: Yes     Types: Marijuana    Sexual activity: Not Currently     Review of Systems   Constitutional: Negative for activity change and appetite change.   HENT: Negative for congestion and sore throat.    Eyes: Negative for discharge and visual disturbance.   Respiratory: Positive for chest tightness and shortness of breath.    Cardiovascular: Negative for chest pain and leg swelling.   Gastrointestinal: Negative for abdominal  pain and nausea.   Genitourinary: Negative for dysuria and hematuria.   Musculoskeletal: Positive for back pain. Negative for myalgias.   Skin: Negative for pallor and rash.   Neurological: Negative for dizziness and weakness.   Psychiatric/Behavioral: Negative for behavioral problems. The patient is not nervous/anxious.    All other systems reviewed and are negative.    Objective:     Vital Signs (Most Recent):  Temp: 97.5 °F (36.4 °C) (09/28/20 0603)  Pulse: 62 (09/28/20 1145)  Resp: 20 (09/28/20 1145)  BP: (!) 152/72 (09/28/20 1145)  SpO2: 99 % (09/28/20 1145) Vital Signs (24h Range):  Temp:  [97.5 °F (36.4 °C)] 97.5 °F (36.4 °C)  Pulse:  [59-67] 62  Resp:  [16-20] 20  SpO2:  [95 %-100 %] 99 %  BP: (121-174)/(63-90) 152/72        There is no height or weight on file to calculate BMI.    Physical Exam  Vitals signs and nursing note reviewed.   Constitutional:       Appearance: He is well-developed.   HENT:      Head: Atraumatic.   Neck:      Musculoskeletal: Neck supple.      Vascular: No JVD.   Cardiovascular:      Rate and Rhythm: Normal rate and regular rhythm.      Heart sounds: Normal heart sounds. No murmur. No gallop.       Comments: Right radial site looks unremarkable  Pulmonary:      Effort: Pulmonary effort is normal. No respiratory distress.      Breath sounds: Normal breath sounds. No wheezing.   Abdominal:      General: Bowel sounds are normal. There is no distension.      Palpations: Abdomen is soft.      Tenderness: There is no guarding or rebound.   Lymphadenopathy:      Cervical: No cervical adenopathy.   Skin:     General: Skin is warm.      Capillary Refill: Capillary refill takes less than 2 seconds.      Findings: No rash.   Neurological:      Mental Status: He is alert and oriented to person, place, and time.      Cranial Nerves: No cranial nerve deficit.   Psychiatric:         Behavior: Behavior normal.             Significant Labs: All pertinent labs within the past 24 hours have been  reviewed.    Significant Imaging: I have reviewed all pertinent imaging results/findings within the past 24 hours.

## 2020-09-28 NOTE — H&P
[facility] Medicine  History & Physical    Patient Name: Louie Wahl  MRN: [id number]  Admission Date: 9/28/2020  Attending Physician: Sadia Middleton MD   Primary Care Provider: Rachel Nuñez NP         Patient information was obtained from patient, spouse/SO and past medical records.     Subjective:     Principal Problem:Coronary artery disease of native artery of native heart with stable angina pectoris    Chief Complaint:   Chief Complaint   Patient presents with    Chest Pain        HPI: 60-year-old with past medical history of hypertension, coronary artery disease, hyperlipidemia, aortic root thrombus came for outpatient scheduled angiogram found to have multivessel disease.  Hospital Medicine was called in to admit the patient due to need of CABG.  Upon my interview patient denies any symptoms except right radial discomfort.  Upon further asking it appears that patient has stable angina upon minimal exertion.  Last year patient had abnormal stress test however he was found to have aortic root thrombus and was placed on Eliquis.  Finally it appears the thrombus dissolved and he came for outpatient scheduled angiogram.  Otherwise denies any fever, chills, headache, dizziness, chest pain, palpitations, nausea, vomiting, bladder or bowel symptoms.  Denies any prior history of CVA or MI.  Significant family history of heart disease.     Past Medical History:   Diagnosis Date    Atrial flutter     Coronary artery disease     Hyperlipidemia     Hypertension        Past Surgical History:   Procedure Laterality Date    TRANSESOPHAGEAL ECHOCARDIOGRAPHY         Review of patient's allergies indicates:   Allergen Reactions    Ace inhibitors Hives and Swelling       No current facility-administered medications on file prior to encounter.      Current Outpatient Medications on File Prior to Encounter   Medication Sig    aspirin 81 MG Chew Take 81 mg by mouth once daily.     atorvastatin (LIPITOR) 80 MG tablet Take 80 mg by mouth once daily.    clopidogrel (PLAVIX) 75 mg tablet Take 75 mg by mouth once daily.    ELIQUIS 5 mg Tab TAKE 1 TABLET BY MOUTH TWICE A DAY    isosorbide mononitrate (IMDUR) 30 MG 24 hr tablet TAKE 1 TABLET BY MOUTH EVERY DAY    metoprolol succinate (TOPROL-XL) 50 MG 24 hr tablet Take 50 mg by mouth. 50mg every morning, 25mg at night    multivit-minerals/folic acid (SPECTRAVITE ADULT ORAL) Take by mouth.    omega-3 acid ethyl esters (LOVAZA) 1 gram capsule Take 2 g by mouth once daily.     pantoprazole (PROTONIX) 40 MG tablet Take 40 mg by mouth once daily.     Family History     Problem Relation (Age of Onset)    Heart disease Mother, Father    Heart failure Mother, Father        Tobacco Use    Smoking status: Current Some Day Smoker    Smokeless tobacco: Never Used    Tobacco comment: 1 pack per month   Substance and Sexual Activity    Alcohol use: Not Currently     Comment: Social    Drug use: Yes     Types: Marijuana    Sexual activity: Not Currently     Review of Systems   Constitutional: Negative for activity change and appetite change.   HENT: Negative for congestion and sore throat.    Eyes: Negative for discharge and visual disturbance.   Respiratory: Positive for chest tightness and shortness of breath.    Cardiovascular: Negative for chest pain and leg swelling.   Gastrointestinal: Negative for abdominal pain and nausea.   Genitourinary: Negative for dysuria and hematuria.   Musculoskeletal: Positive for back pain. Negative for myalgias.   Skin: Negative for pallor and rash.   Neurological: Negative for dizziness and weakness.   Psychiatric/Behavioral: Negative for behavioral problems. The patient is not nervous/anxious.    All other systems reviewed and are negative.    Objective:     Vital Signs (Most Recent):  Temp: 97.5 °F (36.4 °C) (09/28/20 0603)  Pulse: 62 (09/28/20 1145)  Resp: 20 (09/28/20 1145)  BP: (!) 152/72 (09/28/20  1145)  SpO2: 99 % (09/28/20 1145) Vital Signs (24h Range):  Temp:  [97.5 °F (36.4 °C)] 97.5 °F (36.4 °C)  Pulse:  [59-67] 62  Resp:  [16-20] 20  SpO2:  [95 %-100 %] 99 %  BP: (121-174)/(63-90) 152/72        There is no height or weight on file to calculate BMI.    Physical Exam  Vitals signs and nursing note reviewed.   Constitutional:       Appearance: He is well-developed.   HENT:      Head: Atraumatic.   Neck:      Musculoskeletal: Neck supple.      Vascular: No JVD.   Cardiovascular:      Rate and Rhythm: Normal rate and regular rhythm.      Heart sounds: Normal heart sounds. No murmur. No gallop.       Comments: Right radial site looks unremarkable  Pulmonary:      Effort: Pulmonary effort is normal. No respiratory distress.      Breath sounds: Normal breath sounds. No wheezing.   Abdominal:      General: Bowel sounds are normal. There is no distension.      Palpations: Abdomen is soft.      Tenderness: There is no guarding or rebound.   Lymphadenopathy:      Cervical: No cervical adenopathy.   Skin:     General: Skin is warm.      Capillary Refill: Capillary refill takes less than 2 seconds.      Findings: No rash.   Neurological:      Mental Status: He is alert and oriented to person, place, and time.      Cranial Nerves: No cranial nerve deficit.   Psychiatric:         Behavior: Behavior normal.             Significant Labs: All pertinent labs within the past 24 hours have been reviewed.    Significant Imaging: I have reviewed all pertinent imaging results/findings within the past 24 hours.    Assessment/Plan:     60-year-old very pleasant gentleman came for outpatient angiogram found to have multivessel coronary artery disease now admitted for need for CABG.     Assessment:  Active Hospital Problems    Diagnosis  POA    *Coronary artery disease of native artery of native heart with stable angina pectoris [I25.118]  Yes     Reportedly had three-vessel disease on a prior angiogram done a couple of years ago.   Currently with angina. Lexiscan positive for ST segment changes with chest pains. Lasted for 30 minutes      Abnormal stress test [R94.39]  Yes    Essential hypertension [I10]  Yes    Dyslipidemia [E78.5]  Yes    History of aortic arch thrombus [I74.10]  Yes     Distal Aortic arch thrombus. Likely on a plaque. Diagnosed by DIONISIO. Very mobile.  Resolved on repeat transesophageal echocardiogram with no mobile component noted on 3/17/2020.         Resolved Hospital Problems   No resolved problems to display.         Plan:  Admit to Cardiology B-inpatient    Consult     Consult -as per my discussion patient has multivessel disease and needs CABG.  Unfortunately he took his Plavix today.  Eliquis is already on hold for last couple days    Continue holding Eliquis and Plavix    Aspirin, statin, beta-blocker    Post cath protocol    Get routine labs tomorrow morning    Serial EKGs, daily cardiac enzymes, telemetry monitoring, incentive spirometer, encourage activity before surgery    Further management as per 's plan    Subcu Lovenox for VTE prophylaxis when cleared by cardiology      VTE Risk Mitigation (From admission, onward)         Ordered     IP VTE HIGH RISK PATIENT  Once      09/28/20 0913     Place sequential compression device  Until discontinued      09/28/20 0913     heparin (porcine) injection  As needed (PRN)      09/28/20 0752                   Sadia Middleton MD  Department of Hospital Medicine   [facility]

## 2020-09-28 NOTE — CONSULTS
Consult request received appreciated.  The patient was interviewed, his cardiac catheterization and today's echocardiogram were reviewed.  His medical record was reviewed.  The case was discussed with Dr. Dickens.   In view of the patient's history of a prior documented thrombus in his aortic arch, we will obtain a CT scan of his aorta in to evaluate for thoracic aortic calcification and plans are being made for transesophageal echocardiography tomorrow.  Assuming no prohibitive issues arise, we will aim for coronary artery bypass grafting on September 30, 2020.

## 2020-09-29 ENCOUNTER — ANESTHESIA EVENT (OUTPATIENT)
Dept: SURGERY | Facility: HOSPITAL | Age: 60
DRG: 236 | End: 2020-09-29
Payer: MEDICAID

## 2020-09-29 ENCOUNTER — ANESTHESIA (OUTPATIENT)
Dept: CARDIOLOGY | Facility: HOSPITAL | Age: 60
DRG: 287 | End: 2020-09-29
Payer: MEDICAID

## 2020-09-29 ENCOUNTER — CLINICAL SUPPORT (OUTPATIENT)
Dept: CARDIOLOGY | Facility: HOSPITAL | Age: 60
DRG: 287 | End: 2020-09-29
Attending: INTERNAL MEDICINE
Payer: MEDICAID

## 2020-09-29 ENCOUNTER — ANESTHESIA EVENT (OUTPATIENT)
Dept: CARDIOLOGY | Facility: HOSPITAL | Age: 60
DRG: 287 | End: 2020-09-29
Payer: MEDICAID

## 2020-09-29 VITALS — HEIGHT: 68 IN | WEIGHT: 182 LBS | BODY MASS INDEX: 27.58 KG/M2

## 2020-09-29 LAB
ALBUMIN SERPL BCP-MCNC: 4.4 G/DL (ref 3.5–5.2)
ALP SERPL-CCNC: 66 U/L (ref 55–135)
ALT SERPL W/O P-5'-P-CCNC: 84 U/L (ref 10–44)
ANION GAP SERPL CALC-SCNC: 16 MMOL/L (ref 8–16)
APTT PPP: 27.5 SEC (ref 23.6–33.3)
AST SERPL-CCNC: 68 U/L (ref 10–40)
BASOPHILS # BLD AUTO: 0.04 K/UL (ref 0–0.2)
BASOPHILS NFR BLD: 0.5 % (ref 0–1.9)
BILIRUB SERPL-MCNC: 1.1 MG/DL (ref 0.1–1)
BLEEDING TIME: >15 MIN (ref 3.5–9.5)
BUN SERPL-MCNC: 17 MG/DL (ref 6–20)
CALCIUM SERPL-MCNC: 9.7 MG/DL (ref 8.7–10.5)
CHLORIDE SERPL-SCNC: 102 MMOL/L (ref 95–110)
CO2 SERPL-SCNC: 23 MMOL/L (ref 23–29)
CREAT SERPL-MCNC: 1 MG/DL (ref 0.5–1.4)
DIFFERENTIAL METHOD: ABNORMAL
EOSINOPHIL # BLD AUTO: 0.2 K/UL (ref 0–0.5)
EOSINOPHIL NFR BLD: 2.9 % (ref 0–8)
ERYTHROCYTE [DISTWIDTH] IN BLOOD BY AUTOMATED COUNT: 12.2 % (ref 11.5–14.5)
EST. GFR  (AFRICAN AMERICAN): >60 ML/MIN/1.73 M^2
EST. GFR  (NON AFRICAN AMERICAN): >60 ML/MIN/1.73 M^2
GLUCOSE SERPL-MCNC: 113 MG/DL (ref 70–110)
HCT VFR BLD AUTO: 42.7 % (ref 40–54)
HGB BLD-MCNC: 14.2 G/DL (ref 14–18)
IMM GRANULOCYTES # BLD AUTO: 0.03 K/UL (ref 0–0.04)
IMM GRANULOCYTES NFR BLD AUTO: 0.4 % (ref 0–0.5)
INR PPP: 1.1
LYMPHOCYTES # BLD AUTO: 1.9 K/UL (ref 1–4.8)
LYMPHOCYTES NFR BLD: 23.7 % (ref 18–48)
MAGNESIUM SERPL-MCNC: 2 MG/DL (ref 1.6–2.6)
MCH RBC QN AUTO: 31 PG (ref 27–31)
MCHC RBC AUTO-ENTMCNC: 33.3 G/DL (ref 32–36)
MCV RBC AUTO: 93 FL (ref 82–98)
MONOCYTES # BLD AUTO: 0.8 K/UL (ref 0.3–1)
MONOCYTES NFR BLD: 10.6 % (ref 4–15)
NEUTROPHILS # BLD AUTO: 4.9 K/UL (ref 1.8–7.7)
NEUTROPHILS NFR BLD: 61.9 % (ref 38–73)
NRBC BLD-RTO: 0 /100 WBC
PHOSPHATE SERPL-MCNC: 4.3 MG/DL (ref 2.7–4.5)
PLATELET # BLD AUTO: 233 K/UL (ref 150–350)
PMV BLD AUTO: 11 FL (ref 9.2–12.9)
POTASSIUM SERPL-SCNC: 4.5 MMOL/L (ref 3.5–5.1)
PROT SERPL-MCNC: 7.4 G/DL (ref 6–8.4)
PROTHROMBIN TIME: 13.8 SEC (ref 10.6–14.8)
RBC # BLD AUTO: 4.58 M/UL (ref 4.6–6.2)
SODIUM SERPL-SCNC: 141 MMOL/L (ref 136–145)
TROPONIN I SERPL DL<=0.01 NG/ML-MCNC: <0.03 NG/ML
WBC # BLD AUTO: 7.89 K/UL (ref 3.9–12.7)

## 2020-09-29 PROCEDURE — 63600175 PHARM REV CODE 636 W HCPCS: Performed by: NURSE ANESTHETIST, CERTIFIED REGISTERED

## 2020-09-29 PROCEDURE — 85002 BLEEDING TIME TEST: CPT

## 2020-09-29 PROCEDURE — 83735 ASSAY OF MAGNESIUM: CPT

## 2020-09-29 PROCEDURE — 93325 DOPPLER ECHO COLOR FLOW MAPG: CPT

## 2020-09-29 PROCEDURE — 93325 TRANSESOPHAGEAL ECHO (TEE) (CUPID ONLY): ICD-10-PCS | Mod: 26,,, | Performed by: INTERNAL MEDICINE

## 2020-09-29 PROCEDURE — 25000003 PHARM REV CODE 250: Performed by: NURSE ANESTHETIST, CERTIFIED REGISTERED

## 2020-09-29 PROCEDURE — 93312 ECHO TRANSESOPHAGEAL: CPT | Mod: 26,,, | Performed by: INTERNAL MEDICINE

## 2020-09-29 PROCEDURE — 37000009 HC ANESTHESIA EA ADD 15 MINS: Performed by: INTERNAL MEDICINE

## 2020-09-29 PROCEDURE — 80053 COMPREHEN METABOLIC PANEL: CPT

## 2020-09-29 PROCEDURE — 85730 THROMBOPLASTIN TIME PARTIAL: CPT

## 2020-09-29 PROCEDURE — 25000003 PHARM REV CODE 250: Performed by: INTERNAL MEDICINE

## 2020-09-29 PROCEDURE — 94761 N-INVAS EAR/PLS OXIMETRY MLT: CPT

## 2020-09-29 PROCEDURE — 37000008 HC ANESTHESIA 1ST 15 MINUTES: Performed by: INTERNAL MEDICINE

## 2020-09-29 PROCEDURE — 25000003 PHARM REV CODE 250: Performed by: HOSPITALIST

## 2020-09-29 PROCEDURE — 85610 PROTHROMBIN TIME: CPT

## 2020-09-29 PROCEDURE — 84484 ASSAY OF TROPONIN QUANT: CPT

## 2020-09-29 PROCEDURE — 21400001 HC TELEMETRY ROOM

## 2020-09-29 PROCEDURE — 93325 DOPPLER ECHO COLOR FLOW MAPG: CPT | Mod: 26,,, | Performed by: INTERNAL MEDICINE

## 2020-09-29 PROCEDURE — 93312 TRANSESOPHAGEAL ECHO (TEE) (CUPID ONLY): ICD-10-PCS | Mod: 26,,, | Performed by: INTERNAL MEDICINE

## 2020-09-29 PROCEDURE — 84100 ASSAY OF PHOSPHORUS: CPT

## 2020-09-29 PROCEDURE — 85025 COMPLETE CBC W/AUTO DIFF WBC: CPT

## 2020-09-29 PROCEDURE — 36415 COLL VENOUS BLD VENIPUNCTURE: CPT

## 2020-09-29 RX ORDER — SODIUM CHLORIDE 0.9 % (FLUSH) 0.9 %
10 SYRINGE (ML) INJECTION
Status: DISCONTINUED | OUTPATIENT
Start: 2020-09-29 | End: 2020-09-30 | Stop reason: HOSPADM

## 2020-09-29 RX ORDER — SODIUM CHLORIDE 9 MG/ML
INJECTION, SOLUTION INTRAVENOUS CONTINUOUS PRN
Status: DISCONTINUED | OUTPATIENT
Start: 2020-09-29 | End: 2020-09-29

## 2020-09-29 RX ORDER — EPINEPHRINE 0.1 MG/ML
INJECTION INTRAVENOUS
Status: DISPENSED
Start: 2020-09-29 | End: 2020-09-29

## 2020-09-29 RX ORDER — NALOXONE HCL 0.4 MG/ML
VIAL (ML) INJECTION
Status: DISCONTINUED
Start: 2020-09-29 | End: 2020-09-29 | Stop reason: WASHOUT

## 2020-09-29 RX ORDER — SODIUM CHLORIDE 9 MG/ML
INJECTION, SOLUTION INTRAVENOUS ONCE
Status: COMPLETED | OUTPATIENT
Start: 2020-09-29 | End: 2020-09-29

## 2020-09-29 RX ORDER — FLUMAZENIL 0.1 MG/ML
INJECTION INTRAVENOUS
Status: DISCONTINUED
Start: 2020-09-29 | End: 2020-09-29 | Stop reason: WASHOUT

## 2020-09-29 RX ORDER — PROPOFOL 10 MG/ML
VIAL (ML) INTRAVENOUS
Status: DISCONTINUED | OUTPATIENT
Start: 2020-09-29 | End: 2020-09-29

## 2020-09-29 RX ORDER — ATROPINE SULFATE 0.1 MG/ML
INJECTION INTRAVENOUS
Status: DISPENSED
Start: 2020-09-29 | End: 2020-09-29

## 2020-09-29 RX ADMIN — PROPOFOL 20 MG: 10 INJECTION, EMULSION INTRAVENOUS at 12:09

## 2020-09-29 RX ADMIN — PROPOFOL 25 MG: 10 INJECTION, EMULSION INTRAVENOUS at 12:09

## 2020-09-29 RX ADMIN — ASPIRIN 81 MG: 81 TABLET, CHEWABLE ORAL at 09:09

## 2020-09-29 RX ADMIN — ISOSORBIDE MONONITRATE 30 MG: 30 TABLET, EXTENDED RELEASE ORAL at 09:09

## 2020-09-29 RX ADMIN — METOPROLOL SUCCINATE 50 MG: 50 TABLET, FILM COATED, EXTENDED RELEASE ORAL at 09:09

## 2020-09-29 RX ADMIN — SODIUM CHLORIDE: 9 INJECTION, SOLUTION INTRAVENOUS at 12:09

## 2020-09-29 RX ADMIN — PROPOFOL 60 MG: 10 INJECTION, EMULSION INTRAVENOUS at 12:09

## 2020-09-29 RX ADMIN — SODIUM CHLORIDE 1000 ML: 0.9 INJECTION, SOLUTION INTRAVENOUS at 09:09

## 2020-09-29 RX ADMIN — MELATONIN 6 MG: at 10:09

## 2020-09-29 RX ADMIN — PANTOPRAZOLE SODIUM 40 MG: 40 TABLET, DELAYED RELEASE ORAL at 06:09

## 2020-09-29 RX ADMIN — ATORVASTATIN CALCIUM 80 MG: 40 TABLET, FILM COATED ORAL at 10:09

## 2020-09-29 NOTE — PROGRESS NOTES
The patient remains clinically stable.  He underwent a DIONISIO earlier today, showing no evidence for thrombus previously seen in the aortic arch.  CT scan of his chest reveals mild scattering of calcification of the his thoracic aorta, but not prohibitive for CABG.  His bleeding time was elevated greater than 15 minutes today, was certainly is a consequence of Plavix.  I have rescheduled his surgery for Tuesday October 6, 2020.  I will discharge the patient tomorrow morning with a Lovenox bridge and readmit him at the time of the planned procedure.

## 2020-09-29 NOTE — ANESTHESIA PREPROCEDURE EVALUATION
09/29/2020  Louie Wahl is a 60 y.o., male.    Patient Active Problem List   Diagnosis    History of aortic arch thrombus    Coronary artery disease of native artery of native heart with stable angina pectoris    Essential hypertension    Dyslipidemia    Abnormal stress test       Past Surgical History:   Procedure Laterality Date    LEFT HEART CATHETERIZATION Left 9/28/2020    Procedure: Left heart cath;  Surgeon: Angela Dickens MD;  Location: [facility] CATH/EP LAB;  Service: Cardiology;  Laterality: Left;    TRANSESOPHAGEAL ECHOCARDIOGRAPHY          Tobacco Use:  The patient  reports that he has been smoking. He has never used smokeless tobacco.     Results for orders placed or performed during the hospital encounter of 09/25/20   EKG 12-lead    Collection Time: 09/25/20 11:35 AM    Narrative    Test Reason : Z01.810,    Vent. Rate : 072 BPM     Atrial Rate : 072 BPM     P-R Int : 166 ms          QRS Dur : 080 ms      QT Int : 342 ms       P-R-T Axes : 044 056 016 degrees     QTc Int : 374 ms    Normal sinus rhythm  Septal infarct ,age undetermined  Abnormal ECG  When compared with ECG of 17-MAR-2020 07:08,  Septal infarct is now Present  Nonspecific T wave abnormality now evident in Inferior leads  Confirmed by Segundo Bashir MD ([id number]) on 9/27/2020 10:00:14 PM    Referred By:             Confirmed By:Segundo Bashir MD             Lab Results   Component Value Date    WBC 7.89 09/29/2020    HGB 14.2 09/29/2020    HCT 42.7 09/29/2020    MCV 93 09/29/2020     09/29/2020     BMP  Lab Results   Component Value Date     09/29/2020    K 4.5 09/29/2020     09/29/2020    CO2 23 09/29/2020    BUN 17 09/29/2020    CREATININE 1.0 09/29/2020    CALCIUM 9.7 09/29/2020    ANIONGAP 16 09/29/2020     (H) 09/29/2020     (H) 09/25/2020     (H) 03/17/2020       Findings:  1. Aortic valve: Noncoronary cusp of the aortic valve appears mildly thick and calcified with adequate leaflet motion. No significant regurgitation.  2. Mitral valve is structurally normal with good leaflet excursion. No significant regurgitation.   3. Tricuspid valve is structurally normal with good leaflet excursion. No significant regurgitation.  4. Pulmonary valve is structurally normal with good leaflet excursion. No significant regurgitation.  5. Overall LVEF appears normal.   6. Severe atherosclerotic plaque visualized in the visualized portions of the distal arch and descending thoracic aorta. No obvious mobile component noted.      Conclusions:  Severe atherosclerotic plaque noted in the visualized portions of the distal arch and descending thoracic aorta. No obvious mobile component noted.        No results found for this or any previous visit.     IMPRESSION:     Scattered bilateral atheromatous changes without evidence of  hemodynamically significant carotid arterial stenosis.    Pre-op Assessment    I have reviewed the Patient Summary Reports.     I have reviewed the Nursing Notes. I have reviewed the NPO Status.   I have reviewed the Medications.     Review of Systems  Anesthesia Hx:  No problems with previous Anesthesia  Denies Family Hx of Anesthesia complications.   Denies Personal Hx of Anesthesia complications.   Social:  Smoker, No Alcohol Use Marijuana   Hematology/Oncology:  Hematology Normal   Oncology Normal   Hematology Comments: Plavix ( last reported dose 9/28/2020 ) & Eliquis  ( last reported dose 9/24/2020 )    Lovenox bridge with last dose 10/5    EENT/Dental:EENT/Dental Normal   Cardiovascular:   Hypertension, well controlled CAD  Dysrhythmias atrial fibrillation Angina hyperlipidemia ECG has been reviewed. History of aortic arch thrombus  Coronary artery disease of native artery of native heart with stable angina pectoris  Essential hypertension  Crescendo angina.    CAD. 60% distal left main, sever proximal to mid LCx and pRCA lesions.   Disctal aortic arch thrombus. Resolved on DIONISIO done in 3/2020.   Hypertension.   Dyslipidemia  Dyslipidemia  Abnormal stress test  Atrial flutter   Pulmonary:  Pulmonary Normal    Renal/:  Renal/ Normal     Hepatic/GI:   GERD, well controlled No active N/V  No Intestinal Obstruction   Musculoskeletal:   Bilateral lower extremity Radicular pain and Numbness Spine Disorders: lumbar Degenerative disease and Chronic Pain    Neurological:   Peripheral Neuropathy    Endocrine:  Endocrine Normal Pre-Diabetic with no medicines at this time   Dermatological:  Skin Normal    Psych:  Psychiatric Normal           Physical Exam  General:  Well nourished    Airway/Jaw/Neck:  Airway Findings: Mouth Opening: Normal Tongue: Normal  General Airway Assessment: Adult  Mallampati: III  TM Distance: < 4 cm  Jaw/Neck Findings:  Neck ROM: Normal ROM      Dental:  Dental Findings: Molar caps, Lower Dentures, Upper Dentures   Chest/Lungs:  Chest/Lungs Findings: Clear to auscultation, Normal Respiratory Rate     Heart/Vascular:  Heart Findings: Rate: Normal  Rhythm: Regular Rhythm  Sounds: Normal        Mental Status:  Mental Status Findings:  Cooperative, Alert and Oriented         Anesthesia Plan  Type of Anesthesia, risks & benefits discussed:  Anesthesia Type:  general  Patient's Preference: General  Intra-op Monitoring Plan: standard ASA monitors, arterial line, central line, [place] and cardiac output  Intra-op Monitoring Plan Comments:   Post Op Pain Control Plan: per primary service following discharge from PACU, multimodal analgesia and IV/PO Opioids PRN  Post Op Pain Control Plan Comments:   Induction:   IV  Beta Blocker:  Patient is on a Beta-Blocker and has received one dose within the past 24 hours (No further documentation required).       Informed Consent: Patient understands risks and agrees with Anesthesia plan.  Questions answered. Anesthesia  consent signed with patient.  ASA Score: 4     Day of Surgery Review of History & Physical:        Anesthesia Plan Notes: GETA; CVC x2; Art line, SGC        Ready For Surgery From Anesthesia Perspective.

## 2020-09-29 NOTE — ANESTHESIA PREPROCEDURE EVALUATION
09/29/2020  Louie Wahl is a 60 y.o., male.    Patient Active Problem List   Diagnosis    History of aortic arch thrombus    Coronary artery disease of native artery of native heart with stable angina pectoris    Essential hypertension    Dyslipidemia    Abnormal stress test       Past Surgical History:   Procedure Laterality Date    LEFT HEART CATHETERIZATION Left 9/28/2020    Procedure: Left heart cath;  Surgeon: Angela Dickens MD;  Location: [facility] CATH/EP LAB;  Service: Cardiology;  Laterality: Left;    TRANSESOPHAGEAL ECHOCARDIOGRAPHY          Tobacco Use:  The patient  reports that he has been smoking. He has never used smokeless tobacco.     Results for orders placed or performed during the hospital encounter of 09/25/20   EKG 12-lead    Collection Time: 09/25/20 11:35 AM    Narrative    Test Reason : Z01.810,    Vent. Rate : 072 BPM     Atrial Rate : 072 BPM     P-R Int : 166 ms          QRS Dur : 080 ms      QT Int : 342 ms       P-R-T Axes : 044 056 016 degrees     QTc Int : 374 ms    Normal sinus rhythm  Septal infarct ,age undetermined  Abnormal ECG  When compared with ECG of 17-MAR-2020 07:08,  Septal infarct is now Present  Nonspecific T wave abnormality now evident in Inferior leads  Confirmed by Segundo Bashir MD ([id number]) on 9/27/2020 10:00:14 PM    Referred By:             Confirmed By:Segundo Bashir MD             Lab Results   Component Value Date    WBC 7.89 09/29/2020    HGB 14.2 09/29/2020    HCT 42.7 09/29/2020    MCV 93 09/29/2020     09/29/2020     BMP  Lab Results   Component Value Date     09/29/2020    K 4.5 09/29/2020     09/29/2020    CO2 23 09/29/2020    BUN 17 09/29/2020    CREATININE 1.0 09/29/2020    CALCIUM 9.7 09/29/2020    ANIONGAP 16 09/29/2020     (H) 09/29/2020     (H) 09/25/2020     (H) 03/17/2020       Findings:  1. Aortic valve: Noncoronary cusp of the aortic valve appears mildly thick and calcified with adequate leaflet motion. No significant regurgitation.  2. Mitral valve is structurally normal with good leaflet excursion. No significant regurgitation.   3. Tricuspid valve is structurally normal with good leaflet excursion. No significant regurgitation.  4. Pulmonary valve is structurally normal with good leaflet excursion. No significant regurgitation.  5. Overall LVEF appears normal.   6. Severe atherosclerotic plaque visualized in the visualized portions of the distal arch and descending thoracic aorta. No obvious mobile component noted.      Conclusions:  Severe atherosclerotic plaque noted in the visualized portions of the distal arch and descending thoracic aorta. No obvious mobile component noted.        No results found for this or any previous visit.     IMPRESSION:     Scattered bilateral atheromatous changes without evidence of  hemodynamically significant carotid arterial stenosis.    Anesthesia Evaluation    I have reviewed the Patient Summary Reports.    I have reviewed the Nursing Notes. I have reviewed the NPO Status.   I have reviewed the Medications.     Review of Systems  Anesthesia Hx:  No problems with previous Anesthesia  Denies Family Hx of Anesthesia complications.   Denies Personal Hx of Anesthesia complications.   Social:  Smoker, No Alcohol Use Marijuana   Hematology/Oncology:  Hematology Normal   Oncology Normal   Hematology Comments: Plavix ( last reported dose 9/28/2020 ) & Eliquis  ( last reported dose 9/24/2020 )    EENT/Dental:EENT/Dental Normal   Cardiovascular:   Hypertension, well controlled CAD  Dysrhythmias atrial fibrillation Angina hyperlipidemia ECG has been reviewed. History of aortic arch thrombus  Coronary artery disease of native artery of native heart with stable angina pectoris  Essential hypertension  Crescendo angina.   CAD. 60% distal left main, sever  proximal to mid LCx and pRCA lesions.   Disctal aortic arch thrombus. Resolved on DIONISIO done in 3/2020.   Hypertension.   Dyslipidemia  Dyslipidemia  Abnormal stress test  Atrial flutter   Pulmonary:  Pulmonary Normal    Renal/:  Renal/ Normal     Hepatic/GI:   GERD, well controlled No active N/V  No Intestinal Obstruction   Musculoskeletal:   Bilateral lower extremity Radicular pain and Numbness Spine Disorders: lumbar Degenerative disease and Chronic Pain    Neurological:   Peripheral Neuropathy    Endocrine:  Endocrine Normal Pre-Diabetic with no medicines at this time   Dermatological:  Skin Normal    Psych:  Psychiatric Normal           Physical Exam  General:  Well nourished    Airway/Jaw/Neck:  Airway Findings: Mouth Opening: Normal Tongue: Normal  General Airway Assessment: Adult  Mallampati: III  TM Distance: < 4 cm  Jaw/Neck Findings:  Neck ROM: Normal ROM      Dental:  Dental Findings: Molar caps, Lower Dentures, Upper Dentures   Chest/Lungs:  Chest/Lungs Findings: Clear to auscultation, Normal Respiratory Rate     Heart/Vascular:  Heart Findings: Rate: Normal  Rhythm: Regular Rhythm  Sounds: Normal        Mental Status:  Mental Status Findings:  Cooperative, Alert and Oriented         Anesthesia Plan  Type of Anesthesia, risks & benefits discussed:  Anesthesia Type:  MAC  Patient's Preference:   Intra-op Monitoring Plan: standard ASA monitors  Intra-op Monitoring Plan Comments:   Post Op Pain Control Plan:   Post Op Pain Control Plan Comments:   Induction:    Beta Blocker:  Patient is on a Beta-Blocker and has received one dose within the past 24 hours (No further documentation required).       Informed Consent: Patient understands risks and agrees with Anesthesia plan.  Questions answered. Anesthesia consent signed with patient.  ASA Score: 4     Day of Surgery Review of History & Physical:        Anesthesia Plan Notes: MAC with Propofol  POM Mask        Ready For Surgery From Anesthesia Perspective.

## 2020-09-29 NOTE — NURSING
DIONISIO probe inserted at 12:46. Dr. Dickens, Dr. Lopez, Pk Torres, JHONATHAN, and Carmen at bedside.  DIONISIO probe taken out at 12:56. Patient sitting up in bed communicating at 13:05.

## 2020-09-29 NOTE — PROGRESS NOTES
[facility]  Cardiology  Progress Note    Patient Name: Louie Wahl  MRN: [id number]  Admission Date: 9/28/2020  Hospital Length of Stay: 1 days  Code Status: Full Code   Attending Physician: French Neil MD   Primary Care Physician: Rachel Nuñez NP  Expected Discharge Date:   Principal Problem:Coronary artery disease of native artery of native heart with stable angina pectoris    Subjective:       Interval History: Had some flutters overnight. Denies any chest pain.     ROS  Objective:     Vital Signs (Most Recent):  Temp: 98.7 °F (37.1 °C) (09/29/20 0454)  Pulse: 61 (09/29/20 0645)  Resp: 15 (09/29/20 0454)  BP: 137/66 (09/29/20 0645)  SpO2: 98 % (09/29/20 0454) Vital Signs (24h Range):  Temp:  [97.4 °F (36.3 °C)-98.7 °F (37.1 °C)] 98.7 °F (37.1 °C)  Pulse:  [57-67] 61  Resp:  [15-20] 15  SpO2:  [95 %-100 %] 98 %  BP: (121-174)/(63-84) 137/66     Weight: 82.8 kg (182 lb 8.7 oz)  Body mass index is 27.76 kg/m².    SpO2: 98 %  O2 Device (Oxygen Therapy): room air      Intake/Output Summary (Last 24 hours) at 9/29/2020 0743  Last data filed at 9/29/2020 0600  Gross per 24 hour   Intake 420 ml   Output --   Net 420 ml       Lines/Drains/Airways     Peripheral Intravenous Line                 Peripheral IV - Single Lumen 09/28/20 0608 20 G Right Forearm 1 day         Peripheral IV - Single Lumen 09/28/20 0609 20 G Right Forearm 1 day                Scheduled Meds:   aspirin  81 mg Oral Daily    atorvastatin  80 mg Oral QHS    isosorbide mononitrate  30 mg Oral Daily    metoprolol succinate  50 mg Oral Daily    pantoprazole  40 mg Oral Daily    polyethylene glycol  17 g Oral Daily     Continuous Infusions:   sodium chloride 0.9% 100 mL/hr at 09/28/20 0612     PRN Meds:.acetaminophen, calcium chloride IVPB, calcium chloride IVPB, calcium chloride IVPB, HYDROcodone-acetaminophen, magnesium oxide, magnesium sulfate IVPB, magnesium sulfate IVPB, magnesium sulfate IVPB, magnesium sulfate IVPB,  melatonin, morphine, nitroGLYCERIN, ondansetron, ondansetron, potassium chloride in water, potassium chloride in water, potassium chloride in water, potassium chloride in water, potassium chloride, potassium chloride, potassium chloride, potassium chloride, sodium chloride 0.9%, sodium phosphate IVPB, sodium phosphate IVPB, sodium phosphate IVPB, sodium phosphate IVPB, sodium phosphate IVPB     Physical Exam  HEENT: Normocephalic, atraumatic, PERRL, Conjunctiva pink, no scleral icterus.   CVS: S1S2+, RRR, no murmurs, rubs or gallops, JVP: Normal.  LUNGS: Clear  ABDOMEN: Soft, NT, BS+  EXTREMITIES: No cyanosis, clubbing or edema  NEURO: AAO X 3.   RIGHT WRIST: No hematoma or bleeding.     Significant Labs:   BMP:   Recent Labs   Lab 09/29/20  0529   *      K 4.5      CO2 23   BUN 17   CREATININE 1.0   CALCIUM 9.7   MG 2.0   , CMP   Recent Labs   Lab 09/29/20  0529      K 4.5      CO2 23   *   BUN 17   CREATININE 1.0   CALCIUM 9.7   PROT 7.4   ALBUMIN 4.4   BILITOT 1.1*   ALKPHOS 66   AST 68*   ALT 84*   ANIONGAP 16   ESTGFRAFRICA >60.0   EGFRNONAA >60.0   , CBC   Recent Labs   Lab 09/29/20  0529   WBC 7.89   HGB 14.2   HCT 42.7      , INR   Recent Labs   Lab 09/29/20  0529   INR 1.1   , Lipid Panel No results for input(s): CHOL, HDL, LDLCALC, TRIG, CHOLHDL in the last 48 hours. and Troponin   Recent Labs   Lab 09/29/20  0529   TROPONINI <0.030       Significant Imaging: Reviewed  Assessment and Plan:     IMPRESSION:  Crescendo angina.   CAD. 60% distal left main, sever proximal to mid LCx and pRCA lesions.   Disctal aortic arch thrombus. Resolved on DIONISIO done in 3/2020.   Hypertension.   Dyslipidemia.         PLAN:  1. Discussed with Dr Garvin.   2. In view of the prior history will repeat DIONISIO to assess for any significant mobile thrombus in the arch.   3. Indications, risks, benefits as well as alternative to the procedure were discussed with the patient in layman terms  and informed consent would be obtained.         Angela Dickens MD  Cardiology  [facility]

## 2020-09-29 NOTE — ANESTHESIA POSTPROCEDURE EVALUATION
Anesthesia Post Evaluation    Patient: Louie Wahl    Procedure(s) Performed: Procedure(s) (LRB):  Transesophageal echo (DIONISIO) intra-procedure log documentation (N/A)    Final Anesthesia Type: MAC    Patient location during evaluation: floor  Patient participation: Yes- Able to Participate  Level of consciousness: awake and alert, oriented and awake  Post-procedure vital signs: reviewed and stable  Pain management: adequate  Airway patency: patent    PONV status at discharge: No PONV  Anesthetic complications: no      Cardiovascular status: blood pressure returned to baseline, hemodynamically stable and stable  Respiratory status: unassisted, spontaneous ventilation and room air  Hydration status: euvolemic  Follow-up not needed.  Comments: The patient states that he was comfortable for the procedure and is without recall procedure.          Vitals Value Taken Time   /72 09/29/20 1315   Temp 36.7 °C (98.1 °F) 09/29/20 0755   Pulse 65 09/29/20 1330   Resp 23 09/29/20 1330   SpO2 98 % 09/29/20 1330   Vitals shown include unvalidated device data.      No case tracking events are documented in the log.      Pain/Brent Score: Brent Score: 10 (9/29/2020  1:26 PM)

## 2020-09-29 NOTE — PROGRESS NOTES
[facility] Medicine  Progress Note    Patient name: Louie Wahl  MRN: [id number]  Admit Date: 9/28/2020   LOS: 1 day     SUBJECTIVE:     Principal problem: Coronary artery disease of native artery of native heart with stable angina pectoris    Interval History:  Admitted yesterday after angiogram revealed multivessel disease.    Denies chest pain, shortness of breath or nausea/vomiting.      Scheduled Meds:   aspirin  81 mg Oral Daily    atorvastatin  80 mg Oral QHS    isosorbide mononitrate  30 mg Oral Daily    metoprolol succinate  50 mg Oral Daily    pantoprazole  40 mg Oral Daily    polyethylene glycol  17 g Oral Daily     Continuous Infusions:   sodium chloride 0.9% 100 mL/hr at 09/28/20 0612     PRN Meds:acetaminophen, calcium chloride IVPB, calcium chloride IVPB, calcium chloride IVPB, HYDROcodone-acetaminophen, magnesium oxide, magnesium sulfate IVPB, magnesium sulfate IVPB, magnesium sulfate IVPB, magnesium sulfate IVPB, melatonin, morphine, nitroGLYCERIN, ondansetron, ondansetron, potassium chloride in water, potassium chloride in water, potassium chloride in water, potassium chloride in water, potassium chloride, potassium chloride, potassium chloride, potassium chloride, sodium chloride 0.9%, sodium phosphate IVPB, sodium phosphate IVPB, sodium phosphate IVPB, sodium phosphate IVPB, sodium phosphate IVPB    Review of patient's allergies indicates:   Allergen Reactions    Ace inhibitors Hives and Swelling       Review of Systems: As per interval history    OBJECTIVE:     Vital Signs (Most Recent)  Temp: 98.1 °F (36.7 °C) (09/29/20 0755)  Pulse: 61 (09/29/20 0645)  Resp: 15 (09/29/20 0755)  BP: 137/66 (09/29/20 0645)  SpO2: 98 % (09/29/20 0454)    Vital Signs Range (Last 24H):  Temp:  [97.4 °F (36.3 °C)-98.7 °F (37.1 °C)]   Pulse:  [57-66]   Resp:  [15-20]   BP: (137-174)/(66-84)   SpO2:  [97 %-99 %]     I & O (Last 24H):    Intake/Output Summary (Last 24 hours) at  9/29/2020 1011  Last data filed at 9/29/2020 0600  Gross per 24 hour   Intake 420 ml   Output --   Net 420 ml       Physical Exam:  General: Patient resting comfortably in no acute distress. Appears as stated age. Calm  Eyes: No conjunctival injection. No scleral icterus.  ENT: Hearing grossly intact. No discharge from ears. No nasal discharge.   Neck: Supple, trachea midline. No JVD  CVS: RRR. No LE edema BL  Lungs: CTA BL, no wheezing or crackles. Good breath sounds. No accessory muscle use. No acute respiratory distress  Abdomen:  Soft, nontender and nondistended.  No organomegaly  Neuro: AOx3. Moves all extremities. Follows commands. Responds appropriately   Skin:  No rash or erythema noted    Laboratory:  CBC:   Recent Labs   Lab 09/29/20  0529   WBC 7.89   RBC 4.58*   HGB 14.2   HCT 42.7      MCV 93   MCH 31.0   MCHC 33.3     CMP:   Recent Labs   Lab 09/29/20  0529   *   CALCIUM 9.7   ALBUMIN 4.4   PROT 7.4      K 4.5   CO2 23      BUN 17   CREATININE 1.0   ALKPHOS 66   ALT 84*   AST 68*   BILITOT 1.1*       Diagnostic Results:  Labs: Reviewed  CT: Reviewed    CT Chest Without Contrast [[id number]] Collected: 09/28/20 1835   Order Status: Completed Updated: 09/28/20 2016   Narrative:     CMS MANDATED QUALITY DATA - CT RADIATION - 436     All CT scans at this facility utilize dose modulation, iterative   reconstruction, and/or weight based dosing when appropriate to reduce   radiation dose to as low as reasonably achievable.           Reason: Chest pain, chronic, high prob of CAD   Evaluation of Thoracic aorta for calcification     TECHNIQUE: CT thorax without IV contrast.     COMPARISON: None     CT THORAX:   Aside from minor subpleural paraseptal emphysema, lungs are clear.   Trachea and main bronchi are patent.     Thoracic aorta is of normal caliber containing very mild scattered   calcified plaque. Coronary artery calcifications are present. No   pleural or pericardial effusion. No  enlarged mediastinal lymph nodes.     Visualized upper abdomen shows moderate hepatic steatosis.     Chronic left rib deformities indicating old healed fractures   incidentally noted. Mild degenerative changes affect the thoracic   spine and bilateral sternomanubrial joints.     IMPRESSION:     1. Normal caliber thoracic aorta contains very mild scattered   calcified plaque.   2. Coronary artery calcifications.   3. Moderate hepatic steatosis.     Electronically Signed by Jay Jay Mckeon M.D. on 9/28/2020 8:13 PM         ASSESSMENT/PLAN:     60-year-old male with HTN and HLD found to have multivessel coronary artery disease on elective angiogram now admitted for CABG.     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 Likely on a plaque. Diagnosed by DIONISIO. Very mobile.  Resolved on repeat transesophageal echocardiogram with no mobile component noted on 3/17/2020.         Resolved Hospital Problems   No resolved problems to display.       Plan:   CAD with multi-vessel dz   Plans for CABG noted for 09/30   Hx of distal aortic arch thrombus s/p long-term anticoagulation   DIONISIO planned for today to assess aortic arch thrombus  Continue aspirin, statin, long-acting nitrate and beta-blocker    VTE Risk Mitigation (From admission, onward)         Ordered     IP VTE HIGH RISK PATIENT  Once      09/28/20 0913     Place sequential compression device  Until discontinued      09/28/20 0913                  Department Hospital Medicine  [facility]  French Neil MD  Date of service: 09/29/2020

## 2020-09-29 NOTE — TRANSFER OF CARE
"Anesthesia Transfer of Care Note    Patient: Louie Wahl    Procedure(s) Performed: Procedure(s) (LRB):  Transesophageal echo (DIONISIO) intra-procedure log documentation (N/A)    Patient location: Other: Cardio A    Anesthesia Type: MAC    Post pain: adequate analgesia    Post assessment: no apparent anesthetic complications    Post vital signs: stable    Level of consciousness: awake, alert and oriented    Nausea/Vomiting: no nausea/vomiting    Complications: none    Transfer of care protocol was followed      Last vitals:   Visit Vitals  /66 (BP Location: Left arm, Patient Position: Sitting)   Pulse 61   Temp 36.7 °C (98.1 °F) (Oral)   Resp 15   Ht 5' 8" (1.727 m)   Wt 82.8 kg (182 lb 8.7 oz)   SpO2 98%   BMI 27.76 kg/m²     "

## 2020-09-30 ENCOUNTER — ANESTHESIA (OUTPATIENT)
Dept: SURGERY | Facility: HOSPITAL | Age: 60
DRG: 236 | End: 2020-09-30
Payer: MEDICAID

## 2020-09-30 VITALS
OXYGEN SATURATION: 97 % | WEIGHT: 181.44 LBS | RESPIRATION RATE: 29 BRPM | HEIGHT: 68 IN | DIASTOLIC BLOOD PRESSURE: 75 MMHG | HEART RATE: 58 BPM | TEMPERATURE: 99 F | BODY MASS INDEX: 27.5 KG/M2 | SYSTOLIC BLOOD PRESSURE: 136 MMHG

## 2020-09-30 LAB
ALBUMIN SERPL BCP-MCNC: 4.3 G/DL (ref 3.5–5.2)
ALP SERPL-CCNC: 64 U/L (ref 55–135)
ALT SERPL W/O P-5'-P-CCNC: 72 U/L (ref 10–44)
ANION GAP SERPL CALC-SCNC: 8 MMOL/L (ref 8–16)
AST SERPL-CCNC: 53 U/L (ref 10–40)
AT III ACT/NOR PPP CHRO: 94 % (ref 88–132)
BACTERIA #/AREA URNS HPF: NEGATIVE /HPF
BASOPHILS # BLD AUTO: 0.04 K/UL (ref 0–0.2)
BASOPHILS NFR BLD: 0.4 % (ref 0–1.9)
BILIRUB SERPL-MCNC: 0.8 MG/DL (ref 0.1–1)
BUN SERPL-MCNC: 19 MG/DL (ref 6–20)
CALCIUM SERPL-MCNC: 9.3 MG/DL (ref 8.7–10.5)
CHLORIDE SERPL-SCNC: 103 MMOL/L (ref 95–110)
CO2 SERPL-SCNC: 27 MMOL/L (ref 23–29)
COLD ANTIBODY SCREEN: NORMAL
CREAT SERPL-MCNC: 1 MG/DL (ref 0.5–1.4)
DIFFERENTIAL METHOD: ABNORMAL
EOSINOPHIL # BLD AUTO: 0.2 K/UL (ref 0–0.5)
EOSINOPHIL NFR BLD: 2.4 % (ref 0–8)
ERYTHROCYTE [DISTWIDTH] IN BLOOD BY AUTOMATED COUNT: 12.2 % (ref 11.5–14.5)
EST. GFR  (AFRICAN AMERICAN): >60 ML/MIN/1.73 M^2
EST. GFR  (NON AFRICAN AMERICAN): >60 ML/MIN/1.73 M^2
GLUCOSE SERPL-MCNC: 141 MG/DL (ref 70–110)
HCT VFR BLD AUTO: 40.8 % (ref 40–54)
HGB BLD-MCNC: 13.5 G/DL (ref 14–18)
HYALINE CASTS #/AREA URNS LPF: 1 /LPF
IMM GRANULOCYTES # BLD AUTO: 0.04 K/UL (ref 0–0.04)
IMM GRANULOCYTES NFR BLD AUTO: 0.4 % (ref 0–0.5)
LYMPHOCYTES # BLD AUTO: 2.1 K/UL (ref 1–4.8)
LYMPHOCYTES NFR BLD: 21.5 % (ref 18–48)
MAGNESIUM SERPL-MCNC: 2.1 MG/DL (ref 1.6–2.6)
MCH RBC QN AUTO: 31.6 PG (ref 27–31)
MCHC RBC AUTO-ENTMCNC: 33.1 G/DL (ref 32–36)
MCV RBC AUTO: 96 FL (ref 82–98)
MICROSCOPIC COMMENT: NORMAL
MONOCYTES # BLD AUTO: 1.1 K/UL (ref 0.3–1)
MONOCYTES NFR BLD: 11 % (ref 4–15)
NEUTROPHILS # BLD AUTO: 6.2 K/UL (ref 1.8–7.7)
NEUTROPHILS NFR BLD: 64.3 % (ref 38–73)
NRBC BLD-RTO: 0 /100 WBC
PHOSPHATE SERPL-MCNC: 3.8 MG/DL (ref 2.7–4.5)
PLATELET # BLD AUTO: 229 K/UL (ref 150–350)
PMV BLD AUTO: 10.8 FL (ref 9.2–12.9)
POTASSIUM SERPL-SCNC: 4.6 MMOL/L (ref 3.5–5.1)
PROT SERPL-MCNC: 7.3 G/DL (ref 6–8.4)
RBC # BLD AUTO: 4.27 M/UL (ref 4.6–6.2)
RBC #/AREA URNS HPF: 0 /HPF (ref 0–4)
SODIUM SERPL-SCNC: 138 MMOL/L (ref 136–145)
SQUAMOUS #/AREA URNS HPF: 0 /HPF
TROPONIN I SERPL DL<=0.01 NG/ML-MCNC: <0.03 NG/ML
WBC # BLD AUTO: 9.58 K/UL (ref 3.9–12.7)
WBC #/AREA URNS HPF: 0 /HPF (ref 0–5)

## 2020-09-30 PROCEDURE — 99232 PR SUBSEQUENT HOSPITAL CARE,LEVL II: ICD-10-PCS | Mod: ,,, | Performed by: INTERNAL MEDICINE

## 2020-09-30 PROCEDURE — 25000003 PHARM REV CODE 250: Performed by: HOSPITALIST

## 2020-09-30 PROCEDURE — 80053 COMPREHEN METABOLIC PANEL: CPT

## 2020-09-30 PROCEDURE — 84100 ASSAY OF PHOSPHORUS: CPT

## 2020-09-30 PROCEDURE — 84484 ASSAY OF TROPONIN QUANT: CPT

## 2020-09-30 PROCEDURE — 81001 URINALYSIS AUTO W/SCOPE: CPT

## 2020-09-30 PROCEDURE — 85025 COMPLETE CBC W/AUTO DIFF WBC: CPT

## 2020-09-30 PROCEDURE — 86870 RBC ANTIBODY IDENTIFICATION: CPT

## 2020-09-30 PROCEDURE — 63600175 PHARM REV CODE 636 W HCPCS: Performed by: THORACIC SURGERY (CARDIOTHORACIC VASCULAR SURGERY)

## 2020-09-30 PROCEDURE — 85300 ANTITHROMBIN III ACTIVITY: CPT

## 2020-09-30 PROCEDURE — 36415 COLL VENOUS BLD VENIPUNCTURE: CPT

## 2020-09-30 PROCEDURE — 99232 SBSQ HOSP IP/OBS MODERATE 35: CPT | Mod: ,,, | Performed by: INTERNAL MEDICINE

## 2020-09-30 PROCEDURE — 83735 ASSAY OF MAGNESIUM: CPT

## 2020-09-30 RX ORDER — ENOXAPARIN SODIUM 100 MG/ML
60 INJECTION SUBCUTANEOUS ONCE
Status: COMPLETED | OUTPATIENT
Start: 2020-09-30 | End: 2020-09-30

## 2020-09-30 RX ORDER — ENOXAPARIN SODIUM 100 MG/ML
60 INJECTION SUBCUTANEOUS EVERY 24 HOURS
Status: DISCONTINUED | OUTPATIENT
Start: 2020-09-30 | End: 2020-09-30 | Stop reason: HOSPADM

## 2020-09-30 RX ADMIN — ENOXAPARIN SODIUM 60 MG: 60 INJECTION SUBCUTANEOUS at 10:09

## 2020-09-30 RX ADMIN — METOPROLOL SUCCINATE 50 MG: 50 TABLET, FILM COATED, EXTENDED RELEASE ORAL at 08:09

## 2020-09-30 RX ADMIN — ASPIRIN 81 MG: 81 TABLET, CHEWABLE ORAL at 09:09

## 2020-09-30 RX ADMIN — ISOSORBIDE MONONITRATE 30 MG: 30 TABLET, EXTENDED RELEASE ORAL at 08:09

## 2020-09-30 RX ADMIN — PANTOPRAZOLE SODIUM 40 MG: 40 TABLET, DELAYED RELEASE ORAL at 05:09

## 2020-09-30 NOTE — CONSULTS
Date: September 30, 2020  Time:    Requesting Physician: Nelli Dickens MD    Reason for consultation: Evaluation of coronary artery disease      I .   Consult request received appreciated.  The patient was interviewed and examined.  His medical record, cardiac catheterization and echocardiograms were reviewed.    II.    Impression:  The patient is a 60-year-old male smoker who volunteers a history of exertional related chest pain and dyspnea for the preceding year or more.  Approximately 1-1/2 years ago, it was discovered that there was thrombus present in his aortic arch on transesophageal echocardiography.  The decision at that time was made to treat the thrombus with anticoagulation, and subsequent repeat DIONISIO in March of 2020 and during this hospitalization has revealed resolution of the thrombus.  The patient was admitted to [facility] after undergoing diagnostic coronary angiography which revealed a 60% left main coronary artery stenosis, 50% mid LAD stenosis, tandem 90% and 85% stenosis of the left circumflex coronary artery, and 80% stenosis of the proximal right coronary artery and 60% stenosis of the proximal right posterior descending coronary artery.  His left ventricular ejection fraction is in the range of 50%, no valvular pathology has been identified.  The Thoracic Surgery Service is consulted for evaluation for possible surgical coronary revascularization.  CT scan of the chest does not reveal any significant calcium burden of the ascending aorta.  The patient reports that he has limited his recent physical activities as a consequence of chronic lower back pain.    Prior Medical History:  Atrial flutter                                      Hyperlipidemia                                      Hypertension      Prior Surgical History:  Jaw surgery                                        Right knee surgery at age 4    Allergies: Ace Inhibitors    Home Medications:  ASA 81 mg po q  day                                   Plavix &5 mg po q day - Last dose on September 28, 2020                                   Eliquis  5 mg po BID - Last dose on September 24, 2020                                   Lipitor 80 mg po q HS                                   Imdur 30 mg po q day                                   Toprol-XL 50 mg q am and 25 mg po q HS                                   MVI                                   Lovaza 2 grams po q day                                   Protonix 40 mg po q day    Family History:  Extensive heart disease heart failure in his family    Social History:  He acknowledges smoking occasionally, approximately 1 pack of cigarettes a month, using marijuana on occasion and drinks alcohol on occasion socially    ROS:  The patient denies recent fevers chills nausea vomiting cough or change in bowel or bladder function.  He reports normal blood coagulation prior to institution of anticoagulation.  There has been no history of strokes or TIA he has, no significant weight fluctuations.  No mental health issues reported.  He denies claudication but acknowledges chronic lower back pain.    Exam:  Ht = 5' 8''      Wt= 181#       General - Adult male, appearing stated age overlying x 3 and cooperative examination     HEENT - Atraumatic, normocephalic with extraocular muscles intact nonicteric sclera.  He wears eyeglasses     Neck - Supple, no thyromegaly, no masses, no carotid bruits     Chest - clear bilaterally     Cardiac - Normal S1, S2; no murmurs     Abdomen - Soft, nontender     Ext - No edema or venous varicosties; normal pedal pulses     Neuro - Grossly intact    Carotid Ultrasound: No hemodynamically significant stenosis      III.  Plan:  I have discussed with the patient, using layman's terminology, the nature of his multivessel coronary artery disease.  I have outlined operative versus non operative treatment strategies, and I have recommended surgical coronary  revascularization.  I have also well on the usual attendant risk associated with coronary artery bypass grafting, according the morbidity mortality risk in the range of 5%.  The patient verbalizes understanding and acceptance of these risks, as decide to proceed with the recommended procedure.  All questions have been entertained his satisfaction, and or shortness is a perfect outcome been given.  The patient's bleeding time was elevated greater than 15 minutes yesterday, and for this reason we will plan for scheduled coronary artery bypass grafting on Tuesday October 6, 2020.  In the meantime, he will undergo a Lovenox bridge with the last dose scheduled for Monday October 5, 2020.

## 2020-09-30 NOTE — HOSPITAL COURSE
The patient had a stable hospitalization without adverse consequences.  He tolerated the diagnostic coronary angiogram and transesophageal echocardiogram without difficulty.  Today he remains hemodynamically stable and is ready for discharge to home.

## 2020-09-30 NOTE — HPI
The patient is a 60-year-old male smoker with a history of hypertension and hyperlipidemia who volunteers an approximately year and half history of exertional chest pain and dyspnea.  Previously, he has been diagnosed with aortic arch thrombus on trans-esophageal echocardiography; this was treated with anticoagulation.  On follow-up echocardiography in March 2020 and during this hospitalization, the aortic arch thrombus has resolved.  On September 28, 2020, the patient underwent diagnostic coronary angiography revealing left main coronary artery stenosis of 60%, in addition a triple-vessel coronary artery disease.  He has preserved left ventricular function, and no evidence of valvular pathology.  He has been evaluated by the Thoracic Surgery service and is considered a suitable candidate for coronary artery bypass grafting.  His bleeding time was elevated at greater than 15 minutes yesterday, likely as a consequence of Plavix.  He has been scheduled for elective coronary artery bypass grafting on Tuesday October 6, 2020.

## 2020-09-30 NOTE — PROGRESS NOTES
Cardiac Rehab     Louie ZEFERINO Wahl   [id number]   9/30/2020         Cardiac Rehab Phase Taught: Phase 1    Teaching Method: Verbal    Handouts: None    Educational Videos: None    Understanding:  Knowledge indicated by feedback, Learning indicated by feedback and Verbalize understanding    Comments: pt in bed, states he is to discharge and return next week for surgery. Questions answered. Review of s/s to report/ return to ER. Instructions on use of IS. Pt/wife voiced understanding. Will follow when pt returns for CABG     Total Time Spent:25mins            Raine Camacho RN

## 2020-09-30 NOTE — PLAN OF CARE
09/30/20 1229   Final Note   Assessment Type Final Discharge Note   Anticipated Discharge Disposition Home   Post-Acute Status   Post-Acute Authorization Other   Part D Coverage Medicaid Healthy Blue   Other Status No Post-Acute Service Needs   Discharge Delays None known at this time

## 2020-09-30 NOTE — PROGRESS NOTES
[facility] Medicine  Progress Note    Patient name: Louie Wahl  MRN: [id number]  Admit Date: 9/28/2020   LOS: 2 days     SUBJECTIVE:     Principal problem: Coronary artery disease of native artery of native heart with stable angina pectoris    Interval History:  No acute overnight events reported. Denies chest pain, shortness of breath or nausea/vomiting.      Scheduled Meds:   aspirin  81 mg Oral Daily    atorvastatin  80 mg Oral QHS    isosorbide mononitrate  30 mg Oral Daily    metoprolol succinate  50 mg Oral Daily    pantoprazole  40 mg Oral Daily    polyethylene glycol  17 g Oral Daily      Continuous Infusions:   sodium chloride 0.9% 100 mL/hr at 09/28/20 0612      PRN Meds:acetaminophen, calcium chloride IVPB, calcium chloride IVPB, calcium chloride IVPB, HYDROcodone-acetaminophen, magnesium oxide, magnesium sulfate IVPB, magnesium sulfate IVPB, magnesium sulfate IVPB, magnesium sulfate IVPB, melatonin, morphine, nitroGLYCERIN, ondansetron, ondansetron, potassium chloride in water, potassium chloride in water, potassium chloride in water, potassium chloride in water, potassium chloride, potassium chloride, potassium chloride, potassium chloride, sodium chloride 0.9%, sodium phosphate IVPB, sodium phosphate IVPB, sodium phosphate IVPB, sodium phosphate IVPB, sodium phosphate IVPB      Review of patient's allergies indicates:   Allergen Reactions    Ace inhibitors Hives and Swelling       Review of Systems: As per interval history    OBJECTIVE:     Vital Signs (Most Recent)  Temp: 98.9 °F (37.2 °C) (09/30/20 1110)  Pulse: (!) 58 (09/30/20 1110)  Resp: (!) 29 (09/30/20 1110)  BP: 136/75 (09/30/20 1110)  SpO2: 97 % (09/30/20 0810)    Vital Signs Range (Last 24H):  Temp:  [97.7 °F (36.5 °C)-98.9 °F (37.2 °C)]   Pulse:  [58-72]   Resp:  [16-29]   BP: (123-156)/(55-76)   SpO2:  [97 %-99 %]     I & O (Last 24H):    Intake/Output Summary (Last 24 hours) at 9/30/2020 1503  Last data  filed at 9/30/2020 0600  Gross per 24 hour   Intake 520 ml   Output --   Net 520 ml       Physical Exam:  General: Patient resting comfortably in no acute distress. Appears as stated age. Calm  Eyes: No conjunctival injection. No scleral icterus.  ENT: Hearing grossly intact. No discharge from ears. No nasal discharge.   Neck: Supple, trachea midline. No JVD  CVS: RRR. No LE edema BL  Lungs: CTA BL, no wheezing or crackles. Good breath sounds. No accessory muscle use. No acute respiratory distress  Abdomen:  Soft, nontender and nondistended.  No organomegaly  Neuro: AOx3. Moves all extremities. Follows commands. Responds appropriately   Skin:  No rash or erythema noted    Laboratory:  CBC:   Recent Labs   Lab 09/30/20  0358   WBC 9.58   RBC 4.27*   HGB 13.5*   HCT 40.8      MCV 96   MCH 31.6*   MCHC 33.1     CMP:   Recent Labs   Lab 09/30/20  0358   *   CALCIUM 9.3   ALBUMIN 4.3   PROT 7.3      K 4.6   CO2 27      BUN 19   CREATININE 1.0   ALKPHOS 64   ALT 72*   AST 53*   BILITOT 0.8       Diagnostic Results:  Labs: Reviewed  CT: Reviewed    CT Chest Without Contrast [[id number]] Collected: 09/28/20 1835   Order Status: Completed Updated: 09/28/20 2016   Narrative:     CMS MANDATED QUALITY DATA - CT RADIATION - 436     All CT scans at this facility utilize dose modulation, iterative   reconstruction, and/or weight based dosing when appropriate to reduce   radiation dose to as low as reasonably achievable.           Reason: Chest pain, chronic, high prob of CAD   Evaluation of Thoracic aorta for calcification     TECHNIQUE: CT thorax without IV contrast.     COMPARISON: None     CT THORAX:   Aside from minor subpleural paraseptal emphysema, lungs are clear.   Trachea and main bronchi are patent.     Thoracic aorta is of normal caliber containing very mild scattered   calcified plaque. Coronary artery calcifications are present. No   pleural or pericardial effusion. No enlarged mediastinal  lymph nodes.     Visualized upper abdomen shows moderate hepatic steatosis.     Chronic left rib deformities indicating old healed fractures   incidentally noted. Mild degenerative changes affect the thoracic   spine and bilateral sternomanubrial joints.     IMPRESSION:     1. Normal caliber thoracic aorta contains very mild scattered   calcified plaque.   2. Coronary artery calcifications.   3. Moderate hepatic steatosis.     Electronically Signed by Jay Jay Mckeon M.D. on 9/28/2020 8:13 PM         ASSESSMENT/PLAN:     60-year-old male with HTN and HLD found to have multivessel coronary artery disease on elective angiogram now admitted for CABG.  He was evaluated by both Cardiology and Cardiothoracic surgery.  Patient was considered to be a suitable candidate for CABG however his bleeding time was elevated greater than 15 minutes likely as a consequence of clopidogrel.  He is now scheduled for elective bypass grafting on 10/06/2020.    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 Likely on a plaque. Diagnosed by DIONISIO. Very mobile.  Resolved on repeat transesophageal echocardiogram with no mobile component noted on 3/17/2020.         Resolved Hospital Problems   No resolved problems to display.       Plan:   CAD with multi-vessel dz   Seen by both Cardiology and Cardiothoracic surgery.  Bleeding time noted to be elevated greater than 15 minutes likely as a consequence of his clopidogrel.  Elective CABG scheduled for 10/06/2020.  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 Garvin from cardiothoracic surgery)    VTE Risk Mitigation (From admission, onward)         Ordered     IP VTE HIGH RISK PATIENT  Once      09/28/20 0913                  Department Hospital Medicine  [facility]  French Neil MD  Date of service: 09/30/2020

## 2020-09-30 NOTE — PROGRESS NOTES
[facility]  Cardiology  Progress Note    Patient Name: Louie Wahl  MRN: [id number]  Admission Date: 9/28/2020  Hospital Length of Stay: 2 days  Code Status: Full Code   Attending Physician: French Neil MD   Primary Care Physician: Rachel Nuñez NP  Expected Discharge Date:   Principal Problem:Coronary artery disease of native artery of native heart with stable angina pectoris    Subjective:       Interval History: Denies any chest pain or shortness of breath. Denies any palpitations. Discussed with Dr Garvin. Since patient's bleeding time is high it is better to wait for surgery. Discussed the same with the patient.      ROS   Denies any abdominal pain.   Denies any dizziness.   Objective:     Vital Signs (Most Recent):  Temp: 98.8 °F (37.1 °C) (09/30/20 0810)  Pulse: 62 (09/30/20 0810)  Resp: 17 (09/30/20 0810)  BP: (!) 144/71 (09/30/20 0810)  SpO2: 97 % (09/30/20 0810) Vital Signs (24h Range):  Temp:  [97.7 °F (36.5 °C)-98.8 °F (37.1 °C)] 98.8 °F (37.1 °C)  Pulse:  [60-72] 62  Resp:  [16-22] 17  SpO2:  [97 %-99 %] 97 %  BP: (115-156)/(55-76) 144/71     Weight: 82.3 kg (181 lb 7 oz)  Body mass index is 27.59 kg/m².    SpO2: 97 %  O2 Device (Oxygen Therapy): room air      Intake/Output Summary (Last 24 hours) at 9/30/2020 0859  Last data filed at 9/30/2020 0600  Gross per 24 hour   Intake 720 ml   Output --   Net 720 ml       Lines/Drains/Airways     Peripheral Intravenous Line                 Peripheral IV - Single Lumen 09/28/20 0608 20 G Right Forearm 2 days         Peripheral IV - Single Lumen 09/28/20 0609 20 G Right Forearm 2 days                Scheduled Meds:   aspirin  81 mg Oral Daily    atorvastatin  80 mg Oral QHS    electrolyte-R (pH7.4) IV SOLP 1,000 mL with heparin (porcine) 2,000 Units irrigation   Irrigation Once    electrolyte-R (pH7.4) IV SOLP 1,000 mL with heparin (porcine) 2,000 Units irrigation   Irrigation Once    insulin (HUMAN R) infusion (adults)  1 Units/hr  Intravenous Once    isosorbide mononitrate  30 mg Oral Daily    metoprolol succinate  50 mg Oral Daily    pantoprazole  40 mg Oral Daily    polyethylene glycol  17 g Oral Daily     Continuous Infusions:   sodium chloride 0.9% 100 mL/hr at 09/28/20 0612     PRN Meds:.acetaminophen, calcium chloride IVPB, calcium chloride IVPB, calcium chloride IVPB, HYDROcodone-acetaminophen, magnesium oxide, magnesium sulfate IVPB, magnesium sulfate IVPB, magnesium sulfate IVPB, magnesium sulfate IVPB, melatonin, morphine, nitroGLYCERIN, ondansetron, ondansetron, potassium chloride in water, potassium chloride in water, potassium chloride in water, potassium chloride in water, potassium chloride, potassium chloride, potassium chloride, potassium chloride, sodium chloride 0.9%, sodium phosphate IVPB, sodium phosphate IVPB, sodium phosphate IVPB, sodium phosphate IVPB, sodium phosphate IVPB     Physical Exam  HEENT: Normocephalic, atraumatic, PERRL, Conjunctiva pink, no scleral icterus.   CVS: S1S2+, RRR, no murmurs, rubs or gallops, JVP: Normal.  LUNGS: Clear  ABDOMEN: Soft, NT, BS+  EXTREMITIES: No cyanosis, clubbing or edema  NEURO: AAO X 3.   RIGHT WRIST: No hematoma or bleeding.     Significant Labs:   BMP:   Recent Labs   Lab 09/29/20  0529 09/30/20  0358   * 141*    138   K 4.5 4.6    103   CO2 23 27   BUN 17 19   CREATININE 1.0 1.0   CALCIUM 9.7 9.3   MG 2.0 2.1   , CMP   Recent Labs   Lab 09/29/20 0529 09/30/20  0358    138   K 4.5 4.6    103   CO2 23 27   * 141*   BUN 17 19   CREATININE 1.0 1.0   CALCIUM 9.7 9.3   PROT 7.4 7.3   ALBUMIN 4.4 4.3   BILITOT 1.1* 0.8   ALKPHOS 66 64   AST 68* 53*   ALT 84* 72*   ANIONGAP 16 8   ESTGFRAFRICA >60.0 >60.0   EGFRNONAA >60.0 >60.0   , CBC   Recent Labs   Lab 09/29/20  0529 09/30/20  0358   WBC 7.89 9.58   HGB 14.2 13.5*   HCT 42.7 40.8    229   , INR   Recent Labs   Lab 09/29/20  0529   INR 1.1   , Lipid Panel No results for  input(s): CHOL, HDL, LDLCALC, TRIG, CHOLHDL in the last 48 hours. and Troponin   Recent Labs   Lab 09/29/20  0529 09/30/20  0358   TROPONINI <0.030 <0.030       Significant Imaging: Reviewed  Assessment and Plan:     IMPRESSION:  Crescendo angina.   CAD. 60% distal left main, sever proximal to mid LCx and pRCA lesions.   Disctal aortic arch thrombus. Resolved on DIONISIO done in 3/2020.   Hypertension.   Dyslipidemia.         PLAN:  1. Discharge home.   2. He was advised to hold eliquis, plavix and fish oil. Continue rest of the medications otherwise.   3. Dr Garvin plans for surgery next Tuesday.   4. Discussed with the patient.       Angela Dickens MD  Cardiology  [facility]

## 2020-09-30 NOTE — NURSING
Discharge orders received and given to pt and wife; piv removed intact x2; pt dressed and left via wheelchair to family members vehicle without incident

## 2020-09-30 NOTE — DISCHARGE SUMMARY
[facility]  General Surgery  Discharge Summary      Patient Name: Louie Wahl  MRN: [id number]  Admission Date: 9/28/2020  Hospital Length of Stay: 2 days  Discharge Date and Time:  09/30/2020 11:04 AM  Attending Physician: French Neil MD   Discharging Provider: Long Garvin MD  Primary Care Provider: Rachel Nuñez NP    HPI:          The patient is a 60-year-old male smoker with a history of hypertension and hyperlipidemia who volunteers an approximately year and half history of exertional chest pain and dyspnea.  Previously, he has been diagnosed with aortic arch thrombus on trans-esophageal echocardiography; this was treated with anticoagulation.  On follow-up echocardiography in March 2020 and during this hospitalization, the aortic arch thrombus has resolved.  On September 28, 2020, the patient underwent diagnostic coronary angiography revealing left main coronary artery stenosis of 60%, in addition a triple-vessel coronary artery disease.  He has preserved left ventricular function, and no evidence of valvular pathology.  He has been evaluated by the Thoracic Surgery service and is considered a suitable candidate for coronary artery bypass grafting.  His bleeding time was elevated at greater than 15 minutes yesterday, likely as a consequence of Plavix.  He has been scheduled for elective coronary artery bypass grafting on Tuesday October 6, 2020.    Procedure(s) (LRB):  Transesophageal echo (DIONISIO) intra-procedure log documentation (N/A)      Indwelling Lines/Drains at time of discharge:   Lines/Drains/Airways     None               Hospital Course:          The patient had a stable hospitalization without adverse consequences.  He tolerated the diagnostic coronary angiogram and transesophageal echocardiogram without difficulty.  Today he remains hemodynamically stable and is ready for discharge to home.    Consults:   Consults (From admission, onward)        Status Ordering Provider      Inpatient consult to Anesthesiology  Once     Provider:  Bandar Medina Jr., MD    Acknowledged ANGELA DICKENS     Inpatient consult to Cardiology  Once     Provider:  Angela Dickens MD    Acknowledged YOAN MOROCHO     Inpatient consult to Cardiothoracic Surgery  Once     Provider:  Long Garvin MD    Completed YOAN MOROCHO          Significant Diagnostic Studies: Labs:   CBC   Recent Labs   Lab 09/29/20  0529 09/30/20  0358   WBC 7.89 9.58   HGB 14.2 13.5*   HCT 42.7 40.8    229       Pending Diagnostic Studies:     Procedure Component Value Units Date/Time    Antithrombin III [[id number]] Collected: 09/30/20 1058    Order Status: Sent Lab Status: In process Updated: 09/30/20 1058    Specimen: Blood     Echo Color Flow Doppler? Yes [[id number]] Resulted: 09/28/20 1101    Order Status: Sent Lab Status: In process Updated: 09/28/20 1101     BSA 1.98 m2      Right Atrial Pressure (from IVC) 3 mmHg      TDI SEPTAL 0.08 m/s      LV LATERAL E/E' RATIO 7.20 m/s      LV SEPTAL E/E' RATIO 9.00 m/s      AORTIC VALVE CUSP SEPERATION 1.90 cm      TDI LATERAL 0.10 m/s      PV PEAK VELOCITY 125.48 cm/s      LVIDd 3.76 cm      IVS 0.99 cm      Posterior Wall 0.99 cm      Ao root annulus 2.66 cm      LVIDs 2.76 cm      FS 27 %      LV mass 113.72 g      LA size 3.39 cm      RVDD 246.00 cm      Left Ventricle Relative Wall Thickness 0.53 cm      AV mean gradient 4 mmHg      AV valve area 2.60 cm2      AV Velocity Ratio 90.62     AV index (prosthetic) 0.82     E/A ratio 1.33     Mean e' 0.09 m/s      E wave decelartion time 182.33 msec      LVOT diameter 2.01 cm      LVOT area 3.2 cm2      LVOT peak pritesh 121.43 m/s      LVOT peak VTI 23.45 cm      Ao peak pritesh 1.34 m/s      Ao VTI 28.63 cm      LVOT stroke volume 74.37 cm3      AV peak gradient 7 mmHg      TV rest pulmonary artery pressure 24 mmHg      E/E' ratio 8.00 m/s      MV Peak E Pritesh 0.72 m/s      TR Max Pritesh 2.31 m/s      MV Peak A  Pritesh 0.54 m/s      LV Systolic Volume 52.30 mL      LV Systolic Volume Index 26.8 mL/m2      LV Diastolic Volume 100.60 mL      LV Diastolic Volume Index 51.47 mL/m2      LV Mass Index 58 g/m2      Triscuspid Valve Regurgitation Peak Gradient 21 mmHg     Narrative:      · The estimated PA systolic pressure is 24 mmHg.       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    Specialty: Cardiothoracic Surgery  Why: Open Heart surgery at [facility] on Tuesday October 6, 2020  Contact information:  [address] [place] [id number] [phone]                 Patient Instructions:   No discharge procedures on file.  Medications:  Reconciled Home Medications:      Medication List      CONTINUE taking these medications    aspirin 81 MG Chew  Take 81 mg by mouth once daily.     atorvastatin 80 MG tablet  Commonly known as: LIPITOR  Take 80 mg by mouth once daily.     isosorbide mononitrate 30 MG 24 hr tablet  Commonly known as: IMDUR  TAKE 1 TABLET BY MOUTH EVERY DAY     metoprolol succinate 50 MG 24 hr tablet  Commonly known as: TOPROL-XL  Take 50 mg by mouth. 50mg every morning, 25mg at night     pantoprazole 40 MG tablet  Commonly known as: PROTONIX  Take 40 mg by mouth once daily.     SPECTRAVITE ADULT ORAL  Take by  mouth.        STOP taking these medications    clopidogreL 75 mg tablet  Commonly known as: PLAVIX     ELIQUIS 5 mg Tab  Generic drug: apixaban     omega-3 acid ethyl esters 1 gram capsule  Commonly known as: LOVAZA          Time spent on the discharge of patient: 15 minutes    Long Garvin MD  General Surgery  [facility]

## 2020-09-30 NOTE — PLAN OF CARE
Pharmacy used is [facility] [address] [facility] [address] near [facility], PCP uses [facility] with Mrs Rachel Nuñez NP. Mrs Moncada pt nurse updated pt on Lovenox from downstairs with [facility], and to its use witnessed by this CM. Discussed 5 Wishes Booklet but pt will look up on line since he also wants to look into POA.       09/30/20 1224   Discharge Assessment   Assessment Type Discharge Planning Assessment   Confirmed/corrected address and phone number on facesheet? Yes   Assessment information obtained from? Patient;Caregiver   Expected Length of Stay (days) 1   Communicated expected length of stay with patient/caregiver yes   Prior to hospitilization cognitive status: Alert/Oriented   Prior to hospitalization functional status: Independent   Current cognitive status: Alert/Oriented   Current Functional Status: Independent   Facility Arrived From: Home   Lives With significant other   Able to Return to Prior Arrangements yes   Is patient able to care for self after discharge? Yes   Who are your caregiver(s) and their phone number(s)? mrs Macie De La Rosa at cell [phone]   Patient's perception of discharge disposition home or selfcare   Readmission Within the Last 30 Days no previous admission in last 30 days   Patient currently being followed by outpatient case management? No   Patient currently receives any other outside agency services? No   Part D Coverage Medicaid Healthy Blue   Do you have any problems affording any of your prescribed medications? No   Is the patient taking medications as prescribed? yes   Does the patient have transportation home? Yes   Transportation Anticipated family or friend will provide;car, drives self   Dialysis Name and Scheduled days NA   Does the patient receive services at the Coumadin Clinic? No   Discharge Plan A Home with family   Discharge Plan B Home with family   DME Needed Upon Discharge  none   Patient/Family in Agreement with Plan yes

## 2020-10-01 DIAGNOSIS — Z01.818 OTHER SPECIFIED PRE-OPERATIVE EXAMINATION: Primary | ICD-10-CM

## 2020-10-02 ENCOUNTER — TELEPHONE (OUTPATIENT)
Dept: CARDIOLOGY | Facility: CLINIC | Age: 60
End: 2020-10-02

## 2020-10-02 NOTE — TELEPHONE ENCOUNTER
Patient is worried about abdominal aneurysm that he was told about is going to cause more problems when he has bypass on Tuesday. Told him if he feels like he needs to he should contact Dr. Garvin and verify it.

## 2020-10-02 NOTE — TELEPHONE ENCOUNTER
----- Message from Sonali Crisostomo sent at 10/2/2020  8:53 AM CDT -----  PATIENT CALLED   PLEASE CALL HIM.  DID NOT SAY WHAT ITS ABOUT.  [phone]

## 2020-10-03 ENCOUNTER — LAB VISIT (OUTPATIENT)
Dept: PRIMARY CARE CLINIC | Facility: CLINIC | Age: 60
End: 2020-10-03
Payer: MEDICAID

## 2020-10-03 DIAGNOSIS — Z01.818 OTHER SPECIFIED PRE-OPERATIVE EXAMINATION: ICD-10-CM

## 2020-10-03 PROCEDURE — U0003 INFECTIOUS AGENT DETECTION BY NUCLEIC ACID (DNA OR RNA); SEVERE ACUTE RESPIRATORY SYNDROME CORONAVIRUS 2 (SARS-COV-2) (CORONAVIRUS DISEASE [COVID-19]), AMPLIFIED PROBE TECHNIQUE, MAKING USE OF HIGH THROUGHPUT TECHNOLOGIES AS DESCRIBED BY CMS-2020-01-R: HCPCS

## 2020-10-04 LAB — SARS-COV-2 RNA RESP QL NAA+PROBE: NOT DETECTED

## 2020-10-05 PROCEDURE — 86920 COMPATIBILITY TEST SPIN: CPT

## 2020-10-05 RX ORDER — AMOXICILLIN 500 MG
1 CAPSULE ORAL 2 TIMES DAILY
COMMUNITY
End: 2023-10-16 | Stop reason: SDUPTHER

## 2020-10-05 RX ORDER — CLOPIDOGREL BISULFATE 75 MG/1
75 TABLET ORAL DAILY
COMMUNITY
End: 2020-12-19

## 2020-10-05 NOTE — H&P
Date: October 6, 2020  Time: 7:34 am    The patient was re-assessed and examined. His pre-operative studies were reviewed.    Plan: Will proceed with surgical coronary revascularization as previously planned.

## 2020-10-06 ENCOUNTER — HOSPITAL ENCOUNTER (INPATIENT)
Facility: HOSPITAL | Age: 60
LOS: 6 days | Discharge: HOME | DRG: 236 | End: 2020-10-12
Attending: THORACIC SURGERY (CARDIOTHORACIC VASCULAR SURGERY) | Admitting: THORACIC SURGERY (CARDIOTHORACIC VASCULAR SURGERY)
Payer: MEDICAID

## 2020-10-06 DIAGNOSIS — Z01.818 PRE-OP TESTING: Primary | ICD-10-CM

## 2020-10-06 DIAGNOSIS — I25.118 CORONARY ARTERY DISEASE OF NATIVE ARTERY OF NATIVE HEART WITH STABLE ANGINA PECTORIS: ICD-10-CM

## 2020-10-06 DIAGNOSIS — R61 DIAPHORESIS: ICD-10-CM

## 2020-10-06 LAB
ALBUMIN SERPL BCP-MCNC: 4.2 G/DL (ref 3.5–5.2)
ALLENS TEST: ABNORMAL
ALP SERPL-CCNC: 61 U/L (ref 55–135)
ALT SERPL W/O P-5'-P-CCNC: 96 U/L (ref 10–44)
ANION GAP SERPL CALC-SCNC: 14 MMOL/L (ref 8–16)
ANION GAP SERPL CALC-SCNC: 8 MMOL/L (ref 8–16)
ANION GAP SERPL CALC-SCNC: 9 MMOL/L (ref 8–16)
AORTIC ROOT ANNULUS: 2.66 CM
AORTIC VALVE CUSP SEPERATION: 1.9 CM
APTT PPP: 28.5 SEC (ref 23.6–33.3)
APTT PPP: 30.3 SEC (ref 23.6–33.3)
AST SERPL-CCNC: 65 U/L (ref 10–40)
AV INDEX (PROSTH): 0.82
AV MEAN GRADIENT: 4 MMHG
AV PEAK GRADIENT: 7 MMHG
AV VALVE AREA: 2.6 CM2
AV VELOCITY RATIO: 90.62
BASOPHILS # BLD AUTO: 0.03 K/UL (ref 0–0.2)
BASOPHILS # BLD AUTO: 0.05 K/UL (ref 0–0.2)
BASOPHILS NFR BLD: 0.2 % (ref 0–1.9)
BASOPHILS NFR BLD: 0.7 % (ref 0–1.9)
BILIRUB SERPL-MCNC: 0.8 MG/DL (ref 0.1–1)
BSA FOR ECHO PROCEDURE: 1.98 M2
BSA FOR ECHO PROCEDURE: 1.99 M2
BUN SERPL-MCNC: 16 MG/DL (ref 6–20)
BUN SERPL-MCNC: 17 MG/DL (ref 6–20)
BUN SERPL-MCNC: 18 MG/DL (ref 6–20)
CALCIUM SERPL-MCNC: 8.3 MG/DL (ref 8.7–10.5)
CALCIUM SERPL-MCNC: 8.5 MG/DL (ref 8.7–10.5)
CALCIUM SERPL-MCNC: 9.6 MG/DL (ref 8.7–10.5)
CHLORIDE SERPL-SCNC: 101 MMOL/L (ref 95–110)
CHLORIDE SERPL-SCNC: 105 MMOL/L (ref 95–110)
CHLORIDE SERPL-SCNC: 106 MMOL/L (ref 95–110)
CO2 SERPL-SCNC: 17 MMOL/L (ref 23–29)
CO2 SERPL-SCNC: 23 MMOL/L (ref 23–29)
CO2 SERPL-SCNC: 28 MMOL/L (ref 23–29)
CREAT SERPL-MCNC: 0.9 MG/DL (ref 0.5–1.4)
CREAT SERPL-MCNC: 1 MG/DL (ref 0.5–1.4)
CREAT SERPL-MCNC: 1.1 MG/DL (ref 0.5–1.4)
CV ECHO LV RWT: 0.53 CM
DELSYS: ABNORMAL
DIFFERENTIAL METHOD: ABNORMAL
DIFFERENTIAL METHOD: ABNORMAL
DOP CALC AO PEAK VEL: 1.34 M/S
DOP CALC AO VTI: 28.63 CM
DOP CALC LVOT AREA: 3.2 CM2
DOP CALC LVOT DIAMETER: 2.01 CM
DOP CALC LVOT PEAK VEL: 121.43 M/S
DOP CALC LVOT STROKE VOLUME: 74.37 CM3
DOP CALCLVOT PEAK VEL VTI: 23.45 CM
E WAVE DECELERATION TIME: 182.33 MSEC
E/A RATIO: 1.33
E/E' RATIO: 8 M/S
ECHO LV POSTERIOR WALL: 0.99 CM (ref 0.6–1.1)
EOSINOPHIL # BLD AUTO: 0.1 K/UL (ref 0–0.5)
EOSINOPHIL # BLD AUTO: 0.2 K/UL (ref 0–0.5)
EOSINOPHIL NFR BLD: 0.9 % (ref 0–8)
EOSINOPHIL NFR BLD: 3.3 % (ref 0–8)
ERYTHROCYTE [DISTWIDTH] IN BLOOD BY AUTOMATED COUNT: 12 % (ref 11.5–14.5)
ERYTHROCYTE [DISTWIDTH] IN BLOOD BY AUTOMATED COUNT: 12.2 % (ref 11.5–14.5)
ERYTHROCYTE [DISTWIDTH] IN BLOOD BY AUTOMATED COUNT: 12.3 % (ref 11.5–14.5)
ERYTHROCYTE [SEDIMENTATION RATE] IN BLOOD BY WESTERGREN METHOD: 10 MM/H
ERYTHROCYTE [SEDIMENTATION RATE] IN BLOOD BY WESTERGREN METHOD: 16 MM/H
ERYTHROCYTE [SEDIMENTATION RATE] IN BLOOD BY WESTERGREN METHOD: 17 MM/H
ERYTHROCYTE [SEDIMENTATION RATE] IN BLOOD BY WESTERGREN METHOD: 18 MM/H
ERYTHROCYTE [SEDIMENTATION RATE] IN BLOOD BY WESTERGREN METHOD: 18 MM/H
EST. GFR  (AFRICAN AMERICAN): >60 ML/MIN/1.73 M^2
EST. GFR  (NON AFRICAN AMERICAN): >60 ML/MIN/1.73 M^2
FIO2: 0.4
FIO2: 1
FIO2: 1
FIO2: 40
FLOW: 50
FLOW: 50
FRACTIONAL SHORTENING: 27 % (ref 28–44)
GLUCOSE SERPL-MCNC: 101 MG/DL (ref 70–110)
GLUCOSE SERPL-MCNC: 115 MG/DL (ref 70–110)
GLUCOSE SERPL-MCNC: 125 MG/DL (ref 70–110)
GLUCOSE SERPL-MCNC: 125 MG/DL (ref 70–110)
GLUCOSE SERPL-MCNC: 150 MG/DL (ref 70–110)
GLUCOSE SERPL-MCNC: 152 MG/DL (ref 70–110)
GLUCOSE SERPL-MCNC: 176 MG/DL (ref 70–110)
GLUCOSE SERPL-MCNC: 178 MG/DL (ref 70–110)
GLUCOSE SERPL-MCNC: 186 MG/DL (ref 70–110)
GLUCOSE SERPL-MCNC: 187 MG/DL (ref 70–110)
GLUCOSE SERPL-MCNC: 189 MG/DL (ref 70–110)
GLUCOSE SERPL-MCNC: 199 MG/DL (ref 70–110)
GLUCOSE SERPL-MCNC: 222 MG/DL (ref 70–110)
GLUCOSE SERPL-MCNC: 222 MG/DL (ref 70–110)
GLUCOSE SERPL-MCNC: 230 MG/DL (ref 70–110)
GLUCOSE SERPL-MCNC: 237 MG/DL (ref 70–110)
GLUCOSE SERPL-MCNC: 239 MG/DL (ref 70–110)
GLUCOSE SERPL-MCNC: 240 MG/DL (ref 70–110)
GLUCOSE SERPL-MCNC: 252 MG/DL (ref 70–110)
GLUCOSE SERPL-MCNC: 268 MG/DL (ref 70–110)
HCO3 UR-SCNC: 16.9 MMOL/L (ref 24–28)
HCO3 UR-SCNC: 18.6 MMOL/L (ref 24–28)
HCO3 UR-SCNC: 20.8 MMOL/L (ref 24–28)
HCO3 UR-SCNC: 21.3 MMOL/L (ref 24–28)
HCO3 UR-SCNC: 22.9 MMOL/L (ref 24–28)
HCO3 UR-SCNC: 23.2 MMOL/L (ref 24–28)
HCO3 UR-SCNC: 23.6 MMOL/L (ref 24–28)
HCO3 UR-SCNC: 23.7 MMOL/L (ref 24–28)
HCO3 UR-SCNC: 27.1 MMOL/L (ref 24–28)
HCO3 UR-SCNC: 28.3 MMOL/L (ref 24–28)
HCO3 UR-SCNC: 28.5 MMOL/L (ref 24–28)
HCO3 UR-SCNC: 28.8 MMOL/L (ref 24–28)
HCO3 UR-SCNC: 31 MMOL/L (ref 24–28)
HCT VFR BLD AUTO: 32.1 % (ref 40–54)
HCT VFR BLD AUTO: 38 % (ref 40–54)
HCT VFR BLD AUTO: 39.6 % (ref 40–54)
HCT VFR BLD CALC: 26 %PCV (ref 36–54)
HCT VFR BLD CALC: 27 %PCV (ref 36–54)
HCT VFR BLD CALC: 28 %PCV (ref 36–54)
HCT VFR BLD CALC: 29 %PCV (ref 36–54)
HCT VFR BLD CALC: 32 %PCV (ref 36–54)
HCT VFR BLD CALC: 34 %PCV (ref 36–54)
HCT VFR BLD CALC: 35 %PCV (ref 36–54)
HCT VFR BLD CALC: 36 %PCV (ref 36–54)
HCT VFR BLD CALC: 38 %PCV (ref 36–54)
HCT VFR BLD CALC: 38 %PCV (ref 36–54)
HGB BLD-MCNC: 11 G/DL (ref 14–18)
HGB BLD-MCNC: 12.5 G/DL (ref 14–18)
HGB BLD-MCNC: 13.2 G/DL (ref 14–18)
IMM GRANULOCYTES # BLD AUTO: 0.02 K/UL (ref 0–0.04)
IMM GRANULOCYTES # BLD AUTO: 0.09 K/UL (ref 0–0.04)
IMM GRANULOCYTES NFR BLD AUTO: 0.3 % (ref 0–0.5)
IMM GRANULOCYTES NFR BLD AUTO: 0.6 % (ref 0–0.5)
INR PPP: 1.1
INR PPP: 1.5
INTERVENTRICULAR SEPTUM: 0.99 CM (ref 0.6–1.1)
LEFT ATRIUM SIZE: 3.39 CM
LEFT INTERNAL DIMENSION IN SYSTOLE: 2.76 CM (ref 2.1–4)
LEFT VENTRICLE DIASTOLIC VOLUME INDEX: 51.47 ML/M2
LEFT VENTRICLE DIASTOLIC VOLUME: 100.6 ML
LEFT VENTRICLE MASS INDEX: 58 G/M2
LEFT VENTRICLE SYSTOLIC VOLUME INDEX: 26.8 ML/M2
LEFT VENTRICLE SYSTOLIC VOLUME: 52.3 ML
LEFT VENTRICULAR INTERNAL DIMENSION IN DIASTOLE: 3.76 CM (ref 3.5–6)
LEFT VENTRICULAR MASS: 113.72 G
LV LATERAL E/E' RATIO: 7.2 M/S
LV SEPTAL E/E' RATIO: 9 M/S
LYMPHOCYTES # BLD AUTO: 1.7 K/UL (ref 1–4.8)
LYMPHOCYTES # BLD AUTO: 1.8 K/UL (ref 1–4.8)
LYMPHOCYTES NFR BLD: 11.9 % (ref 18–48)
LYMPHOCYTES NFR BLD: 24.7 % (ref 18–48)
MAGNESIUM SERPL-MCNC: 1.8 MG/DL (ref 1.6–2.6)
MAGNESIUM SERPL-MCNC: 2 MG/DL (ref 1.6–2.6)
MAGNESIUM SERPL-MCNC: 2.1 MG/DL (ref 1.6–2.6)
MCH RBC QN AUTO: 30.8 PG (ref 27–31)
MCH RBC QN AUTO: 31.6 PG (ref 27–31)
MCH RBC QN AUTO: 31.6 PG (ref 27–31)
MCHC RBC AUTO-ENTMCNC: 32.9 G/DL (ref 32–36)
MCHC RBC AUTO-ENTMCNC: 33.3 G/DL (ref 32–36)
MCHC RBC AUTO-ENTMCNC: 34.3 G/DL (ref 32–36)
MCV RBC AUTO: 92 FL (ref 82–98)
MCV RBC AUTO: 93 FL (ref 82–98)
MCV RBC AUTO: 96 FL (ref 82–98)
MIN VOL: 12
MODE: ABNORMAL
MONOCYTES # BLD AUTO: 0.8 K/UL (ref 0.3–1)
MONOCYTES # BLD AUTO: 0.9 K/UL (ref 0.3–1)
MONOCYTES NFR BLD: 12.8 % (ref 4–15)
MONOCYTES NFR BLD: 5.4 % (ref 4–15)
MV PEAK A VEL: 0.54 M/S
MV PEAK E VEL: 0.72 M/S
NEUTROPHILS # BLD AUTO: 11.4 K/UL (ref 1.8–7.7)
NEUTROPHILS # BLD AUTO: 4.2 K/UL (ref 1.8–7.7)
NEUTROPHILS NFR BLD: 58.2 % (ref 38–73)
NEUTROPHILS NFR BLD: 81 % (ref 38–73)
NRBC BLD-RTO: 0 /100 WBC
NRBC BLD-RTO: 0 /100 WBC
PCO2 BLDA: 33.9 MMHG (ref 35–45)
PCO2 BLDA: 36.8 MMHG (ref 35–45)
PCO2 BLDA: 38.2 MMHG (ref 35–45)
PCO2 BLDA: 38.3 MMHG (ref 35–45)
PCO2 BLDA: 40.5 MMHG (ref 35–45)
PCO2 BLDA: 40.6 MMHG (ref 35–45)
PCO2 BLDA: 41 MMHG (ref 35–45)
PCO2 BLDA: 41.3 MMHG (ref 35–45)
PCO2 BLDA: 41.5 MMHG (ref 35–45)
PCO2 BLDA: 42.7 MMHG (ref 35–45)
PCO2 BLDA: 46.1 MMHG (ref 35–45)
PCO2 BLDA: 49.9 MMHG (ref 35–45)
PCO2 BLDA: 53.7 MMHG (ref 35–45)
PEEP: 5
PH SMN: 7.2 [PH] (ref 7.35–7.45)
PH SMN: 7.21 [PH] (ref 7.35–7.45)
PH SMN: 7.24 [PH] (ref 7.35–7.45)
PH SMN: 7.32 [PH] (ref 7.35–7.45)
PH SMN: 7.34 [PH] (ref 7.35–7.45)
PH SMN: 7.36 [PH] (ref 7.35–7.45)
PH SMN: 7.37 [PH] (ref 7.35–7.45)
PH SMN: 7.4 [PH] (ref 7.35–7.45)
PH SMN: 7.43 [PH] (ref 7.35–7.45)
PH SMN: 7.45 [PH] (ref 7.35–7.45)
PH SMN: 7.46 [PH] (ref 7.35–7.45)
PH SMN: 7.48 [PH] (ref 7.35–7.45)
PH SMN: 7.49 [PH] (ref 7.35–7.45)
PIP: 16
PISA TR MAX VEL: 2.31 M/S
PLATELET # BLD AUTO: 160 K/UL (ref 150–350)
PLATELET # BLD AUTO: 231 K/UL (ref 150–350)
PLATELET # BLD AUTO: 246 K/UL (ref 150–350)
PLATELET BLD QL SMEAR: NORMAL
PMV BLD AUTO: 11 FL (ref 9.2–12.9)
PO2 BLDA: 109 MMHG (ref 80–100)
PO2 BLDA: 120 MMHG (ref 80–100)
PO2 BLDA: 155 MMHG (ref 80–100)
PO2 BLDA: 171 MMHG (ref 80–100)
PO2 BLDA: 325 MMHG (ref 80–100)
PO2 BLDA: 340 MMHG (ref 80–100)
PO2 BLDA: 341 MMHG (ref 80–100)
PO2 BLDA: 382 MMHG (ref 80–100)
PO2 BLDA: 479 MMHG (ref 80–100)
PO2 BLDA: 69 MMHG (ref 80–100)
PO2 BLDA: 77 MMHG (ref 80–100)
PO2 BLDA: 82 MMHG (ref 80–100)
PO2 BLDA: 88 MMHG (ref 80–100)
POC ACTIVATED CLOTTING TIME K: 114 SEC (ref 74–137)
POC ACTIVATED CLOTTING TIME K: 114 SEC (ref 74–137)
POC ACTIVATED CLOTTING TIME K: 373 SEC (ref 74–137)
POC ACTIVATED CLOTTING TIME K: 411 SEC (ref 74–137)
POC ACTIVATED CLOTTING TIME K: 544 SEC (ref 74–137)
POC ACTIVATED CLOTTING TIME K: 577 SEC (ref 74–137)
POC ACTIVATED CLOTTING TIME K: 588 SEC (ref 74–137)
POC BE: -11 MMOL/L
POC BE: -2 MMOL/L
POC BE: -2 MMOL/L
POC BE: -5 MMOL/L
POC BE: -9 MMOL/L
POC BE: 0 MMOL/L
POC BE: 3 MMOL/L
POC BE: 5 MMOL/L
POC BE: 6 MMOL/L
POC IONIZED CALCIUM: 1.04 MMOL/L (ref 1.06–1.42)
POC IONIZED CALCIUM: 1.05 MMOL/L (ref 1.06–1.42)
POC IONIZED CALCIUM: 1.06 MMOL/L (ref 1.06–1.42)
POC IONIZED CALCIUM: 1.15 MMOL/L (ref 1.06–1.42)
POC IONIZED CALCIUM: 1.16 MMOL/L (ref 1.06–1.42)
POC IONIZED CALCIUM: 1.17 MMOL/L (ref 1.06–1.42)
POC IONIZED CALCIUM: 1.2 MMOL/L (ref 1.06–1.42)
POC IONIZED CALCIUM: 1.21 MMOL/L (ref 1.06–1.42)
POC IONIZED CALCIUM: 1.21 MMOL/L (ref 1.06–1.42)
POC IONIZED CALCIUM: 1.22 MMOL/L (ref 1.06–1.42)
POC IONIZED CALCIUM: 1.24 MMOL/L (ref 1.06–1.42)
POC PCO2 TEMP: 38.4 MMHG
POC PCO2 TEMP: 40.1 MMHG
POC PCO2 TEMP: 40.2 MMHG
POC PCO2 TEMP: 41.6 MMHG
POC PCO2 TEMP: 45.8 MMHG
POC PCO2 TEMP: 52.1 MMHG
POC PH TEMP: 7.21
POC PH TEMP: 7.25
POC PH TEMP: 7.32
POC PH TEMP: 7.34
POC PH TEMP: 7.37
POC PH TEMP: 7.38
POC PO2 TEMP: 119 MMHG
POC PO2 TEMP: 152 MMHG
POC PO2 TEMP: 69 MMHG
POC PO2 TEMP: 78 MMHG
POC PO2 TEMP: 78 MMHG
POC PO2 TEMP: 85 MMHG
POC SATURATED O2: 100 % (ref 95–100)
POC SATURATED O2: 92 % (ref 95–100)
POC SATURATED O2: 94 % (ref 95–100)
POC SATURATED O2: 95 % (ref 95–100)
POC SATURATED O2: 95 % (ref 95–100)
POC SATURATED O2: 97 % (ref 95–100)
POC SATURATED O2: 98 % (ref 95–100)
POC SATURATED O2: 99 % (ref 95–100)
POC TCO2: 18 MMOL/L (ref 23–27)
POC TCO2: 20 MMOL/L (ref 23–27)
POC TCO2: 22 MMOL/L (ref 23–27)
POC TCO2: 23 MMOL/L (ref 23–27)
POC TCO2: 24 MMOL/L (ref 23–27)
POC TCO2: 25 MMOL/L (ref 23–27)
POC TCO2: 28 MMOL/L (ref 23–27)
POC TCO2: 29 MMOL/L (ref 23–27)
POC TCO2: 30 MMOL/L (ref 23–27)
POC TCO2: 30 MMOL/L (ref 23–27)
POC TCO2: 32 MMOL/L (ref 23–27)
POC TEMPERATURE: ABNORMAL
POTASSIUM BLD-SCNC: 3.6 MMOL/L (ref 3.5–5.1)
POTASSIUM BLD-SCNC: 3.6 MMOL/L (ref 3.5–5.1)
POTASSIUM BLD-SCNC: 4 MMOL/L (ref 3.5–5.1)
POTASSIUM BLD-SCNC: 4 MMOL/L (ref 3.5–5.1)
POTASSIUM BLD-SCNC: 4.2 MMOL/L (ref 3.5–5.1)
POTASSIUM BLD-SCNC: 4.3 MMOL/L (ref 3.5–5.1)
POTASSIUM BLD-SCNC: 4.3 MMOL/L (ref 3.5–5.1)
POTASSIUM BLD-SCNC: 4.4 MMOL/L (ref 3.5–5.1)
POTASSIUM BLD-SCNC: 4.5 MMOL/L (ref 3.5–5.1)
POTASSIUM BLD-SCNC: 4.7 MMOL/L (ref 3.5–5.1)
POTASSIUM BLD-SCNC: 4.7 MMOL/L (ref 3.5–5.1)
POTASSIUM BLD-SCNC: 4.8 MMOL/L (ref 3.5–5.1)
POTASSIUM BLD-SCNC: 5 MMOL/L (ref 3.5–5.1)
POTASSIUM SERPL-SCNC: 3.5 MMOL/L (ref 3.5–5.1)
POTASSIUM SERPL-SCNC: 4.1 MMOL/L (ref 3.5–5.1)
POTASSIUM SERPL-SCNC: 4.4 MMOL/L (ref 3.5–5.1)
PROT SERPL-MCNC: 7.3 G/DL (ref 6–8.4)
PROTHROMBIN TIME: 13.6 SEC (ref 10.6–14.8)
PROTHROMBIN TIME: 17.4 SEC (ref 10.6–14.8)
PS: 10
PV PEAK VELOCITY: 125.48 CM/S
RA PRESSURE: 3 MMHG
RBC # BLD AUTO: 3.48 M/UL (ref 4.6–6.2)
RBC # BLD AUTO: 3.96 M/UL (ref 4.6–6.2)
RBC # BLD AUTO: 4.28 M/UL (ref 4.6–6.2)
RIGHT VENTRICULAR END-DIASTOLIC DIMENSION: 246 CM
SAMPLE: ABNORMAL
SAMPLE: NORMAL
SAMPLE: NORMAL
SITE: ABNORMAL
SODIUM BLD-SCNC: 133 MMOL/L (ref 136–145)
SODIUM BLD-SCNC: 133 MMOL/L (ref 136–145)
SODIUM BLD-SCNC: 134 MMOL/L (ref 136–145)
SODIUM BLD-SCNC: 138 MMOL/L (ref 136–145)
SODIUM BLD-SCNC: 139 MMOL/L (ref 136–145)
SODIUM BLD-SCNC: 139 MMOL/L (ref 136–145)
SODIUM BLD-SCNC: 140 MMOL/L (ref 136–145)
SODIUM BLD-SCNC: 141 MMOL/L (ref 136–145)
SODIUM BLD-SCNC: 141 MMOL/L (ref 136–145)
SODIUM BLD-SCNC: 142 MMOL/L (ref 136–145)
SODIUM SERPL-SCNC: 137 MMOL/L (ref 136–145)
SP02: 100
SP02: 93
SP02: 97
SPONT RATE: 14
TDI LATERAL: 0.1 M/S
TDI SEPTAL: 0.08 M/S
TDI: 0.09 M/S
TR MAX PG: 21 MMHG
TV REST PULMONARY ARTERY PRESSURE: 24 MMHG
VT: 500
VT: 550
WBC # BLD AUTO: 14.08 K/UL (ref 3.9–12.7)
WBC # BLD AUTO: 20 K/UL (ref 3.9–12.7)
WBC # BLD AUTO: 7.24 K/UL (ref 3.9–12.7)

## 2020-10-06 PROCEDURE — 37799 UNLISTED PX VASCULAR SURGERY: CPT

## 2020-10-06 PROCEDURE — 37000008 HC ANESTHESIA 1ST 15 MINUTES: Performed by: THORACIC SURGERY (CARDIOTHORACIC VASCULAR SURGERY)

## 2020-10-06 PROCEDURE — 25000003 PHARM REV CODE 250: Performed by: NURSE ANESTHETIST, CERTIFIED REGISTERED

## 2020-10-06 PROCEDURE — C9248 INJ, CLEVIDIPINE BUTYRATE: HCPCS | Performed by: NURSE ANESTHETIST, CERTIFIED REGISTERED

## 2020-10-06 PROCEDURE — 27000656 HC EYE GOGGLES: Performed by: STUDENT IN AN ORGANIZED HEALTH CARE EDUCATION/TRAINING PROGRAM

## 2020-10-06 PROCEDURE — 20000000 HC ICU ROOM

## 2020-10-06 PROCEDURE — 27000080 OPTIME MED/SURG SUP & DEVICES GENERAL CLASSIFICATION: Performed by: THORACIC SURGERY (CARDIOTHORACIC VASCULAR SURGERY)

## 2020-10-06 PROCEDURE — A4216 STERILE WATER/SALINE, 10 ML: HCPCS | Performed by: THORACIC SURGERY (CARDIOTHORACIC VASCULAR SURGERY)

## 2020-10-06 PROCEDURE — 85730 THROMBOPLASTIN TIME PARTIAL: CPT

## 2020-10-06 PROCEDURE — 84295 ASSAY OF SERUM SODIUM: CPT

## 2020-10-06 PROCEDURE — 80048 BASIC METABOLIC PNL TOTAL CA: CPT

## 2020-10-06 PROCEDURE — 27000676 HC TUBING PRIMARY PLUMSET: Performed by: STUDENT IN AN ORGANIZED HEALTH CARE EDUCATION/TRAINING PROGRAM

## 2020-10-06 PROCEDURE — 63600175 PHARM REV CODE 636 W HCPCS: Performed by: THORACIC SURGERY (CARDIOTHORACIC VASCULAR SURGERY)

## 2020-10-06 PROCEDURE — 99232 PR SUBSEQUENT HOSPITAL CARE,LEVL II: ICD-10-PCS | Mod: ,,, | Performed by: INTERNAL MEDICINE

## 2020-10-06 PROCEDURE — 27000671 HC TUBING MICROBORE EXT: Performed by: STUDENT IN AN ORGANIZED HEALTH CARE EDUCATION/TRAINING PROGRAM

## 2020-10-06 PROCEDURE — 27800505 HC CATH, RADIAL ARTERY KIT: Performed by: STUDENT IN AN ORGANIZED HEALTH CARE EDUCATION/TRAINING PROGRAM

## 2020-10-06 PROCEDURE — 27000675 HC TUBING MICRODRIP: Performed by: STUDENT IN AN ORGANIZED HEALTH CARE EDUCATION/TRAINING PROGRAM

## 2020-10-06 PROCEDURE — C9248 INJ, CLEVIDIPINE BUTYRATE: HCPCS | Performed by: THORACIC SURGERY (CARDIOTHORACIC VASCULAR SURGERY)

## 2020-10-06 PROCEDURE — 27000221 HC OXYGEN, UP TO 24 HOURS

## 2020-10-06 PROCEDURE — 94002 VENT MGMT INPAT INIT DAY: CPT

## 2020-10-06 PROCEDURE — 99900026 HC AIRWAY MAINTENANCE (STAT)

## 2020-10-06 PROCEDURE — 25000003 PHARM REV CODE 250

## 2020-10-06 PROCEDURE — C1729 CATH, DRAINAGE: HCPCS | Performed by: THORACIC SURGERY (CARDIOTHORACIC VASCULAR SURGERY)

## 2020-10-06 PROCEDURE — 27202163 HC CATH MULTI LUMEN: Performed by: STUDENT IN AN ORGANIZED HEALTH CARE EDUCATION/TRAINING PROGRAM

## 2020-10-06 PROCEDURE — 80053 COMPREHEN METABOLIC PANEL: CPT

## 2020-10-06 PROCEDURE — 36000712 HC OR TIME LEV V 1ST 15 MIN: Performed by: THORACIC SURGERY (CARDIOTHORACIC VASCULAR SURGERY)

## 2020-10-06 PROCEDURE — 36415 COLL VENOUS BLD VENIPUNCTURE: CPT

## 2020-10-06 PROCEDURE — 37000009 HC ANESTHESIA EA ADD 15 MINS: Performed by: THORACIC SURGERY (CARDIOTHORACIC VASCULAR SURGERY)

## 2020-10-06 PROCEDURE — 27202276 HC INTRODUCER, PERC 8 FR: Performed by: STUDENT IN AN ORGANIZED HEALTH CARE EDUCATION/TRAINING PROGRAM

## 2020-10-06 PROCEDURE — 94761 N-INVAS EAR/PLS OXIMETRY MLT: CPT

## 2020-10-06 PROCEDURE — 27100025 HC TUBING, SET FLUID WARMER: Performed by: STUDENT IN AN ORGANIZED HEALTH CARE EDUCATION/TRAINING PROGRAM

## 2020-10-06 PROCEDURE — 85027 COMPLETE CBC AUTOMATED: CPT

## 2020-10-06 PROCEDURE — 63600175 PHARM REV CODE 636 W HCPCS: Performed by: NURSE ANESTHETIST, CERTIFIED REGISTERED

## 2020-10-06 PROCEDURE — 27201423 OPTIME MED/SURG SUP & DEVICES STERILE SUPPLY: Performed by: THORACIC SURGERY (CARDIOTHORACIC VASCULAR SURGERY)

## 2020-10-06 PROCEDURE — 99900035 HC TECH TIME PER 15 MIN (STAT)

## 2020-10-06 PROCEDURE — 27000666 HC INFUSOR PRESSURE BAG: Performed by: STUDENT IN AN ORGANIZED HEALTH CARE EDUCATION/TRAINING PROGRAM

## 2020-10-06 PROCEDURE — 25000003 PHARM REV CODE 250: Performed by: THORACIC SURGERY (CARDIOTHORACIC VASCULAR SURGERY)

## 2020-10-06 PROCEDURE — 83735 ASSAY OF MAGNESIUM: CPT | Mod: 91

## 2020-10-06 PROCEDURE — 27000658 HC ARTERIAL LINE ALL: Performed by: STUDENT IN AN ORGANIZED HEALTH CARE EDUCATION/TRAINING PROGRAM

## 2020-10-06 PROCEDURE — 27000673 HC TUBING BLOOD Y: Performed by: STUDENT IN AN ORGANIZED HEALTH CARE EDUCATION/TRAINING PROGRAM

## 2020-10-06 PROCEDURE — 27000284 HC CANNULA NASAL: Performed by: STUDENT IN AN ORGANIZED HEALTH CARE EDUCATION/TRAINING PROGRAM

## 2020-10-06 PROCEDURE — 99232 SBSQ HOSP IP/OBS MODERATE 35: CPT | Mod: ,,, | Performed by: INTERNAL MEDICINE

## 2020-10-06 PROCEDURE — 27200678 HC TRANSDUCER MONITOR KIT TRIPLE: Performed by: STUDENT IN AN ORGANIZED HEALTH CARE EDUCATION/TRAINING PROGRAM

## 2020-10-06 PROCEDURE — 27202107 HC XP QUATRO SENSOR: Performed by: STUDENT IN AN ORGANIZED HEALTH CARE EDUCATION/TRAINING PROGRAM

## 2020-10-06 PROCEDURE — 85025 COMPLETE CBC W/AUTO DIFF WBC: CPT | Mod: 91

## 2020-10-06 PROCEDURE — 36000713 HC OR TIME LEV V EA ADD 15 MIN: Performed by: THORACIC SURGERY (CARDIOTHORACIC VASCULAR SURGERY)

## 2020-10-06 PROCEDURE — 82330 ASSAY OF CALCIUM: CPT

## 2020-10-06 PROCEDURE — 27201107 HC STYLET, STANDARD: Performed by: STUDENT IN AN ORGANIZED HEALTH CARE EDUCATION/TRAINING PROGRAM

## 2020-10-06 PROCEDURE — 85014 HEMATOCRIT: CPT

## 2020-10-06 PROCEDURE — 82803 BLOOD GASES ANY COMBINATION: CPT

## 2020-10-06 PROCEDURE — 84132 ASSAY OF SERUM POTASSIUM: CPT

## 2020-10-06 PROCEDURE — 85610 PROTHROMBIN TIME: CPT | Mod: 91

## 2020-10-06 PROCEDURE — 27200066: Performed by: STUDENT IN AN ORGANIZED HEALTH CARE EDUCATION/TRAINING PROGRAM

## 2020-10-06 PROCEDURE — 36620 INSERTION CATHETER ARTERY: CPT | Performed by: STUDENT IN AN ORGANIZED HEALTH CARE EDUCATION/TRAINING PROGRAM

## 2020-10-06 RX ORDER — HYDROMORPHONE HYDROCHLORIDE 1 MG/ML
0.2 INJECTION, SOLUTION INTRAMUSCULAR; INTRAVENOUS; SUBCUTANEOUS
Status: DISCONTINUED | OUTPATIENT
Start: 2020-10-06 | End: 2020-10-06

## 2020-10-06 RX ORDER — CEFAZOLIN SODIUM 2 G/50ML
2 SOLUTION INTRAVENOUS ONCE
Status: COMPLETED | OUTPATIENT
Start: 2020-10-06 | End: 2020-10-06

## 2020-10-06 RX ORDER — MIDAZOLAM HYDROCHLORIDE 1 MG/ML
INJECTION INTRAMUSCULAR; INTRAVENOUS
Status: DISCONTINUED | OUTPATIENT
Start: 2020-10-06 | End: 2020-10-06

## 2020-10-06 RX ORDER — CEFAZOLIN SODIUM 2 G/50ML
2 SOLUTION INTRAVENOUS
Status: COMPLETED | OUTPATIENT
Start: 2020-10-06 | End: 2020-10-07

## 2020-10-06 RX ORDER — FENTANYL CITRATE 50 UG/ML
INJECTION, SOLUTION INTRAMUSCULAR; INTRAVENOUS
Status: DISCONTINUED | OUTPATIENT
Start: 2020-10-06 | End: 2020-10-06

## 2020-10-06 RX ORDER — HEPARIN SODIUM 10000 [USP'U]/ML
INJECTION, SOLUTION INTRAVENOUS; SUBCUTANEOUS
Status: DISCONTINUED | OUTPATIENT
Start: 2020-10-06 | End: 2020-10-06

## 2020-10-06 RX ORDER — DOCUSATE SODIUM 100 MG/1
100 CAPSULE, LIQUID FILLED ORAL 2 TIMES DAILY
Status: DISCONTINUED | OUTPATIENT
Start: 2020-10-06 | End: 2020-10-12 | Stop reason: HOSPADM

## 2020-10-06 RX ORDER — MIDAZOLAM HYDROCHLORIDE 1 MG/ML
1 INJECTION INTRAMUSCULAR; INTRAVENOUS
Status: DISCONTINUED | OUTPATIENT
Start: 2020-10-06 | End: 2020-10-07

## 2020-10-06 RX ORDER — LORAZEPAM 2 MG/ML
2 INJECTION INTRAMUSCULAR
Status: DISCONTINUED | OUTPATIENT
Start: 2020-10-06 | End: 2020-10-06

## 2020-10-06 RX ORDER — SODIUM BICARBONATE 1 MEQ/ML
SYRINGE (ML) INTRAVENOUS
Status: COMPLETED
Start: 2020-10-06 | End: 2020-10-06

## 2020-10-06 RX ORDER — HYDROCODONE BITARTRATE AND ACETAMINOPHEN 7.5; 325 MG/1; MG/1
1 TABLET ORAL EVERY 4 HOURS PRN
Status: DISCONTINUED | OUTPATIENT
Start: 2020-10-06 | End: 2020-10-12 | Stop reason: HOSPADM

## 2020-10-06 RX ORDER — SODIUM CHLORIDE 9 MG/ML
20 INJECTION, SOLUTION INTRAVENOUS CONTINUOUS
Status: DISCONTINUED | OUTPATIENT
Start: 2020-10-06 | End: 2020-10-07

## 2020-10-06 RX ORDER — ROCURONIUM BROMIDE 10 MG/ML
INJECTION, SOLUTION INTRAVENOUS
Status: DISCONTINUED | OUTPATIENT
Start: 2020-10-06 | End: 2020-10-06

## 2020-10-06 RX ORDER — ONDANSETRON 2 MG/ML
4 INJECTION INTRAMUSCULAR; INTRAVENOUS EVERY 6 HOURS PRN
Status: DISCONTINUED | OUTPATIENT
Start: 2020-10-06 | End: 2020-10-06

## 2020-10-06 RX ORDER — ONDANSETRON 4 MG/1
8 TABLET, ORALLY DISINTEGRATING ORAL EVERY 8 HOURS PRN
Status: DISCONTINUED | OUTPATIENT
Start: 2020-10-06 | End: 2020-10-12 | Stop reason: HOSPADM

## 2020-10-06 RX ORDER — MIDAZOLAM HYDROCHLORIDE 1 MG/ML
INJECTION INTRAMUSCULAR; INTRAVENOUS
Status: COMPLETED
Start: 2020-10-06 | End: 2020-10-06

## 2020-10-06 RX ORDER — MIDAZOLAM HYDROCHLORIDE 1 MG/ML
2 INJECTION INTRAMUSCULAR; INTRAVENOUS
Status: DISCONTINUED | OUTPATIENT
Start: 2020-10-06 | End: 2020-10-07

## 2020-10-06 RX ORDER — SODIUM CHLORIDE, SODIUM LACTATE, POTASSIUM CHLORIDE, CALCIUM CHLORIDE 600; 310; 30; 20 MG/100ML; MG/100ML; MG/100ML; MG/100ML
INJECTION, SOLUTION INTRAVENOUS CONTINUOUS PRN
Status: DISCONTINUED | OUTPATIENT
Start: 2020-10-06 | End: 2020-10-06

## 2020-10-06 RX ORDER — VECURONIUM BROMIDE FOR INJECTION 1 MG/ML
INJECTION, POWDER, LYOPHILIZED, FOR SOLUTION INTRAVENOUS
Status: DISCONTINUED | OUTPATIENT
Start: 2020-10-06 | End: 2020-10-06

## 2020-10-06 RX ORDER — CEFAZOLIN SODIUM 2 G/50ML
2 SOLUTION INTRAVENOUS ONCE
Status: DISCONTINUED | OUTPATIENT
Start: 2020-10-06 | End: 2020-10-06

## 2020-10-06 RX ORDER — DIPHENHYDRAMINE HYDROCHLORIDE 50 MG/ML
25 INJECTION INTRAMUSCULAR; INTRAVENOUS EVERY 4 HOURS PRN
Status: DISCONTINUED | OUTPATIENT
Start: 2020-10-06 | End: 2020-10-06

## 2020-10-06 RX ORDER — CHLORHEXIDINE GLUCONATE ORAL RINSE 1.2 MG/ML
15 SOLUTION DENTAL 2 TIMES DAILY
Status: DISPENSED | OUTPATIENT
Start: 2020-10-06 | End: 2020-10-11

## 2020-10-06 RX ORDER — ONDANSETRON 2 MG/ML
INJECTION INTRAMUSCULAR; INTRAVENOUS
Status: DISCONTINUED | OUTPATIENT
Start: 2020-10-06 | End: 2020-10-06

## 2020-10-06 RX ORDER — POTASSIUM CHLORIDE 29.8 MG/ML
40 INJECTION INTRAVENOUS
Status: DISCONTINUED | OUTPATIENT
Start: 2020-10-06 | End: 2020-10-07

## 2020-10-06 RX ORDER — MORPHINE SULFATE 2 MG/ML
2 INJECTION, SOLUTION INTRAMUSCULAR; INTRAVENOUS
Status: DISCONTINUED | OUTPATIENT
Start: 2020-10-06 | End: 2020-10-12 | Stop reason: HOSPADM

## 2020-10-06 RX ORDER — SODIUM CHLORIDE 9 MG/ML
INJECTION, SOLUTION INTRAVENOUS CONTINUOUS PRN
Status: DISCONTINUED | OUTPATIENT
Start: 2020-10-06 | End: 2020-10-06

## 2020-10-06 RX ORDER — POTASSIUM CHLORIDE 750 MG/1
10 CAPSULE, EXTENDED RELEASE ORAL EVERY 12 HOURS
Status: DISCONTINUED | OUTPATIENT
Start: 2020-10-06 | End: 2020-10-12 | Stop reason: HOSPADM

## 2020-10-06 RX ORDER — SODIUM BICARBONATE 1 MEQ/ML
50 SYRINGE (ML) INTRAVENOUS ONCE
Status: COMPLETED | OUTPATIENT
Start: 2020-10-06 | End: 2020-10-06

## 2020-10-06 RX ORDER — MUPIROCIN 20 MG/G
OINTMENT TOPICAL 2 TIMES DAILY
Status: DISPENSED | OUTPATIENT
Start: 2020-10-06 | End: 2020-10-11

## 2020-10-06 RX ORDER — LORAZEPAM 2 MG/ML
1 INJECTION INTRAMUSCULAR
Status: DISCONTINUED | OUTPATIENT
Start: 2020-10-06 | End: 2020-10-06

## 2020-10-06 RX ORDER — LIDOCAINE HYDROCHLORIDE 20 MG/ML
INJECTION, SOLUTION EPIDURAL; INFILTRATION; INTRACAUDAL; PERINEURAL
Status: DISCONTINUED | OUTPATIENT
Start: 2020-10-06 | End: 2020-10-06

## 2020-10-06 RX ORDER — HYDROCODONE BITARTRATE AND ACETAMINOPHEN 5; 325 MG/1; MG/1
1 TABLET ORAL EVERY 4 HOURS PRN
Status: DISCONTINUED | OUTPATIENT
Start: 2020-10-06 | End: 2020-10-12 | Stop reason: HOSPADM

## 2020-10-06 RX ORDER — ETOMIDATE 2 MG/ML
INJECTION INTRAVENOUS
Status: DISCONTINUED | OUTPATIENT
Start: 2020-10-06 | End: 2020-10-06

## 2020-10-06 RX ORDER — POTASSIUM CHLORIDE 14.9 MG/ML
20 INJECTION INTRAVENOUS
Status: DISCONTINUED | OUTPATIENT
Start: 2020-10-06 | End: 2020-10-07

## 2020-10-06 RX ORDER — SUCCINYLCHOLINE CHLORIDE 20 MG/ML
INJECTION INTRAMUSCULAR; INTRAVENOUS
Status: DISCONTINUED | OUTPATIENT
Start: 2020-10-06 | End: 2020-10-06

## 2020-10-06 RX ORDER — MAGNESIUM SULFATE HEPTAHYDRATE 40 MG/ML
2 INJECTION, SOLUTION INTRAVENOUS
Status: DISCONTINUED | OUTPATIENT
Start: 2020-10-06 | End: 2020-10-07

## 2020-10-06 RX ADMIN — SODIUM CHLORIDE 2 UNITS/HR: 9 INJECTION, SOLUTION INTRAVENOUS at 07:10

## 2020-10-06 RX ADMIN — HEPARIN SODIUM 30000 UNITS: 10000 INJECTION, SOLUTION INTRAVENOUS; SUBCUTANEOUS at 10:10

## 2020-10-06 RX ADMIN — CEFAZOLIN SODIUM 2 G: 2 SOLUTION INTRAVENOUS at 04:10

## 2020-10-06 RX ADMIN — FENTANYL CITRATE 250 MCG: 50 INJECTION INTRAMUSCULAR; INTRAVENOUS at 09:10

## 2020-10-06 RX ADMIN — CEFAZOLIN SODIUM 1 G: 2 SOLUTION INTRAVENOUS at 11:10

## 2020-10-06 RX ADMIN — MIDAZOLAM HYDROCHLORIDE 2 MG: 1 INJECTION, SOLUTION INTRAMUSCULAR; INTRAVENOUS at 08:10

## 2020-10-06 RX ADMIN — ROCURONIUM BROMIDE 5 MG: 10 INJECTION, SOLUTION INTRAVENOUS at 07:10

## 2020-10-06 RX ADMIN — MIDAZOLAM HYDROCHLORIDE 2 MG: 1 INJECTION, SOLUTION INTRAMUSCULAR; INTRAVENOUS at 07:10

## 2020-10-06 RX ADMIN — FENTANYL CITRATE 200 MCG: 50 INJECTION INTRAMUSCULAR; INTRAVENOUS at 12:10

## 2020-10-06 RX ADMIN — SODIUM CHLORIDE: 0.9 INJECTION, SOLUTION INTRAVENOUS at 07:10

## 2020-10-06 RX ADMIN — CEFAZOLIN SODIUM 2 G: 2 SOLUTION INTRAVENOUS at 07:10

## 2020-10-06 RX ADMIN — MIDAZOLAM HYDROCHLORIDE 3 MG: 1 INJECTION, SOLUTION INTRAMUSCULAR; INTRAVENOUS at 12:10

## 2020-10-06 RX ADMIN — SODIUM CHLORIDE 1632 ML: 0.9 INJECTION, SOLUTION INTRAVENOUS at 02:10

## 2020-10-06 RX ADMIN — MIDAZOLAM HYDROCHLORIDE 3 MG: 1 INJECTION, SOLUTION INTRAMUSCULAR; INTRAVENOUS at 07:10

## 2020-10-06 RX ADMIN — FENTANYL CITRATE 250 MCG: 50 INJECTION INTRAMUSCULAR; INTRAVENOUS at 10:10

## 2020-10-06 RX ADMIN — HEPARIN SODIUM: 1000 INJECTION, SOLUTION INTRAVENOUS; SUBCUTANEOUS at 08:10

## 2020-10-06 RX ADMIN — Medication 50 MEQ: at 11:10

## 2020-10-06 RX ADMIN — MAGNESIUM SULFATE IN WATER 2 G: 40 INJECTION, SOLUTION INTRAVENOUS at 02:10

## 2020-10-06 RX ADMIN — MORPHINE SULFATE 2 MG: 2 INJECTION, SOLUTION INTRAMUSCULAR; INTRAVENOUS at 04:10

## 2020-10-06 RX ADMIN — HEPARIN SODIUM 5000 UNITS: 10000 INJECTION, SOLUTION INTRAVENOUS; SUBCUTANEOUS at 10:10

## 2020-10-06 RX ADMIN — FENTANYL CITRATE 300 MCG: 50 INJECTION INTRAMUSCULAR; INTRAVENOUS at 11:10

## 2020-10-06 RX ADMIN — SODIUM CHLORIDE: 9 INJECTION INTRAMUSCULAR; INTRAVENOUS; SUBCUTANEOUS at 08:10

## 2020-10-06 RX ADMIN — VECURONIUM BROMIDE 5 MG: 1 INJECTION, POWDER, LYOPHILIZED, FOR SOLUTION INTRAVENOUS at 11:10

## 2020-10-06 RX ADMIN — ROCURONIUM BROMIDE 40 MG: 10 INJECTION, SOLUTION INTRAVENOUS at 07:10

## 2020-10-06 RX ADMIN — LIDOCAINE HYDROCHLORIDE 80 MG: 20 INJECTION, SOLUTION INTRAVENOUS at 07:10

## 2020-10-06 RX ADMIN — POTASSIUM CHLORIDE 40 MEQ: 29.8 INJECTION, SOLUTION INTRAVENOUS at 02:10

## 2020-10-06 RX ADMIN — FENTANYL CITRATE 250 MCG: 50 INJECTION INTRAMUSCULAR; INTRAVENOUS at 08:10

## 2020-10-06 RX ADMIN — MIDAZOLAM HYDROCHLORIDE 2 MG: 1 INJECTION, SOLUTION INTRAMUSCULAR; INTRAVENOUS at 11:10

## 2020-10-06 RX ADMIN — FENTANYL CITRATE 250 MCG: 50 INJECTION INTRAMUSCULAR; INTRAVENOUS at 11:10

## 2020-10-06 RX ADMIN — SUCCINYLCHOLINE CHLORIDE 160 MG: 20 INJECTION, SOLUTION INTRAMUSCULAR; INTRAVENOUS at 07:10

## 2020-10-06 RX ADMIN — CLEVIPIDINE 1 MG/HR: 0.5 EMULSION INTRAVENOUS at 09:10

## 2020-10-06 RX ADMIN — ETOMIDATE 12 MG: 2 INJECTION, SOLUTION INTRAVENOUS at 07:10

## 2020-10-06 RX ADMIN — VECURONIUM BROMIDE 5 MG: 1 INJECTION, POWDER, LYOPHILIZED, FOR SOLUTION INTRAVENOUS at 09:10

## 2020-10-06 RX ADMIN — ONDANSETRON 4 MG: 2 INJECTION INTRAMUSCULAR; INTRAVENOUS at 07:10

## 2020-10-06 RX ADMIN — SODIUM CHLORIDE, SODIUM LACTATE, POTASSIUM CHLORIDE, AND CALCIUM CHLORIDE: .6; .31; .03; .02 INJECTION, SOLUTION INTRAVENOUS at 07:10

## 2020-10-06 RX ADMIN — MORPHINE SULFATE 2 MG: 2 INJECTION, SOLUTION INTRAMUSCULAR; INTRAVENOUS at 03:10

## 2020-10-06 RX ADMIN — FENTANYL CITRATE 250 MCG: 50 INJECTION INTRAMUSCULAR; INTRAVENOUS at 07:10

## 2020-10-06 RX ADMIN — SODIUM CHLORIDE 1632 ML: 0.9 INJECTION, SOLUTION INTRAVENOUS at 11:10

## 2020-10-06 RX ADMIN — PROTAMINE SULFATE 350 MG: 10 INJECTION, SOLUTION INTRAVENOUS at 11:10

## 2020-10-06 RX ADMIN — SODIUM BICARBONATE 50 MEQ: 84 INJECTION, SOLUTION INTRAVENOUS at 11:10

## 2020-10-06 RX ADMIN — CLEVIPIDINE 2 MG/HR: 0.5 EMULSION INTRAVENOUS at 05:10

## 2020-10-06 NOTE — TRANSFER OF CARE
"Anesthesia Transfer of Care Note    Patient: Louie Wahl    Procedure(s) Performed: Procedure(s) (LRB):  CORONARY ARTERY BYPASS GRAFT (CABG) x4 (N/A)  SURGICAL PROCUREMENT, VEIN, ENDOSCOPIC (Left)    Patient location: ICU    Anesthesia Type: general    Transport from OR: Transported from OR on 100% O2 by closed face mask with adequate spontaneous ventilation. Continuous ECG monitoring in transport. Continuous SpO2 monitoring in transport. Continuos invasive BP monitoring in transport    Post pain: adequate analgesia    Post assessment: no apparent anesthetic complications    Post vital signs: stable    Nausea/Vomiting: no nausea/vomiting    Complications: none    Transfer of care protocol was followed      Last vitals:   Visit Vitals  /81   Pulse 60   Temp 37 °C (98.6 °F) (Oral)   Resp 17   Ht 5' 8" (1.727 m)   Wt 81.6 kg (179 lb 14.3 oz)   SpO2 98%   BMI 27.35 kg/m²     "

## 2020-10-06 NOTE — ANESTHESIA PROCEDURE NOTES
Arterial    Diagnosis: cad    Patient location during procedure: pre-op  Procedure start time: 10/6/2020 6:46 AM  Timeout: 10/6/2020 6:45 AM  Procedure end time: 10/6/2020 6:55 AM    Staffing  Authorizing Provider: Sadiq Kohler MD  Performing Provider: Sadiq Kohler MD    Anesthesiologist was present at the time of the procedure.    Preanesthetic Checklist  Completed: patient identified, site marked, surgical consent, pre-op evaluation, timeout performed, IV checked, risks and benefits discussed, monitors and equipment checked and anesthesia consent givenArterial  Skin Prep: chlorhexidine gluconate  Local Infiltration: lidocaine  Orientation: left  Location: radial  Catheter Size: 20 G  Catheter placement by Anatomical landmarks. Heme positive aspiration all ports.Insertion Attempts: 1  Assessment  Dressing: sutured in place and taped and secured with tape  Patient: Tolerated well

## 2020-10-06 NOTE — OP NOTE
"      Date: October 6, 2020      Pre-Operative Diagnosis: Coronary artery disease    Post-Operative Diagnosis: Coronary artery disease    Operation: 1. Aorto-coronary bypass grafting x 5 vessels ( Saphenous Vein Graft to the Left Anterior Descending coronary artery, a Sequential style Saphenous Vein Graft to the Right Posterior Descending coronary artery and Right Inferior Ventricular branch, and a natural "Y" Sequential style Saphenous Vein Graft to the Ramus Intermedius coronary artery and the Distal Left Circumflex coronary artery )                    2.  Endoscopic harvest of the left greater saphenous vein    Surgeon: Long Garvin MD    Assistant: Alfredo Washington MD    Anesthesia: General    Estimated blood loss: 800 cc      Indication for procedure:           The patient is a 60-year-old male with a history of exertional related apnea and chest pain for the couple of years.  Approximately a year and half ago, intraluminal thrombus was discovered in the aortic arch on transesophageal echocardiography.  The decision was made to treat the thrombus with anticoagulation and subsequent repeat transesophageal echocardiography in March of 2020 and last week revealed resolution of the thrombus.  The patient finally agreed to coronary angiography and underwent the procedure on September 30, 2020 at [facility]. The study revealed triple-vessel coronary artery disease, including a 60% left main coronary artery stenosis.  There was no high-grade stenosis within the length of the left anterior descending coronary artery.  The patient had a left ventricular ejection fraction of 50%, and no valvular pathology was identified.  After consultation with the Thoracic Surgery Service, the patient decided proceed with surgical coronary revascularization.  Due to concerns of possible competitive flow and premature LIMA graft failure, the decision was made to utilize a saphenous vein graft to revascularize left " "anterior descending coronary artery.  The patient presents today to [facility] for the planned procedure.  Of note, there was no Surgical Resident to assist with this operation.  Dr. Alfredo Washington was surgical first assistant; his duties included assistance with cannulation and decannulation from cardiopulmonary bypass, providing suctioning, suture following and exposure during construction of all vascular anastomoses.      Procedure in detail:         The patient was transferred to the Operating Theater and placed on the operating table in the supine position.  EKG, pulse oximetry monitoring line, and a noninvasive blood pressure cuff were applied.  The patient underwent induction, intubation, and administration of general anesthesia via oral endotracheal tube.  A Cordis, pulmonary artery Macedon-Flavia catheter and radial arterial line were inserted by the Anesthesia Service using sterile technique.  A Johnson catheter was inserted.  A shoulder roll was placed along with adequate support behind the patient's head, being sure to maintain the neck in a neutral position.  The patient's skin was prepped from his chin to his toes with Betadine, followed by alcohol, and then skin , and then draped in a sterile standard fashion with Ioban drapes.  A " time-out " was performed, confirming the patient's identity and planned procedure.         Through a small incision at the left knee, the left greater saphenous vein was identified and encircled with a vessel loop.  Using the [employer] endoscopic vein harvest appliance, the left greater saphenous vein was harvested up to the level of the left groin.  At this point, the vein was ligated with silk ligature and divided.  Additional length of left greater saphenous vein conduit was harvested subcutaneously to near the left ankle were through another counter incision, the vein was again ligated with silk ligature and divided.  The left greater saphenous vein " "conduit was brought to the back table and prepared in the usual fashion with a Mills valvulotome, and then placed in iced heparinized saline solution for preservation.  The harvest incisions on the left leg were later closed with a combination of absorbable sutures and skin staples.         A median sternotomy was incision was performed.  The sternum was divided in the midline with a reciprocating sternal saw.  Bleeding from the sternal edges was controlled with electrocautery.  A sternal retractor was inserted.  The thymus was divided in the midline.  The pericardium was opened through an inverted "Y" incision.  The pericardial cradle was created with a series of silk stay sutures.  Palpation of the ascending aorta did not reveal any problematic atherosclerotic plaque.  A bolus of intravenous heparin was given for systemic anticoagulation.  Two silk pursestring sutures were placed just proximal to the origin of the innominate artery.  A single pursestring suture was placed around the right atrial appendage.  The aorta was cannulated with a size 20 Tajik EPOA cannula.  This cannula was secured in position with the aforementioned silk pursestring sutures, Rumel tourniquets, and an umbilical tape.  The cannula was then connected to the arterial line of the cardiopulmonary bypass circuit with care to insure that no air bubbles were present.  A dual stage venous cannula was directed through the right atrial appendage where was secured with the aforementioned silk pursestring suture, a Rumel tourniquet and an umbilical tape.  The cannula was then connected to the venous line of the cardiopulmonary bypass circuit.  A single antegrade cardioplegia catheter was placed in the ascending aorta where was secured with 5.0 Prolene suture.  The cannula was then connected to the cardioplegia circuit with care to insure that no air bubbles were present.  After confirming that the activated clotting time was greater than 400 " "seconds, the patient was placed on full cardiopulmonary bypass and the distal target vessels were marked with a Woodson blade.  Cooling of the patient down to 27 degree centigrade was begun.  The ascending aorta was crossclamped and an initial dose of 500 cc of cold crystalloid hyperkalemic cardioplegia was administered into the aortic root with subsequent diastolic arrest of the heart.  Iced saline slush solution was placed on the heart to assist with myocardial preservation.  Of note, during the aortic cross-clamp duration, additional 250 cc a aliquots of cardioplegia were administered at approximately twenty minute intervals.         The first distal target vessel was to the right inferior ventricular branch which measured 2.5 mm in diameter.  The saphenous vein graft was 4.5 mm in diameter and of excellent quality.  The second distal anastomosis was to the right posterior descending coronary artery which measured 2.0 mm diameter.  This anastomosis was constructed in a sequential style fashion and the saphenous vein graft was 5.0 mm in diameter and of excellent quality.  This section of saphenous vein graft was cut to appropriate orientation and length, then tacked to the pericardium with Prolene suture.  The third distal anastomosis was to the ramus intermedius coronary artery which measured 1.75 mm in diameter; the saphenous vein graft was a natural "Y" branch which measured 4.0 mm in diameter and was of excellent quality.  The fourth distal anastomosis was to the distal left circumflex coronary artery which measured 2.0 mm in diameter.  This anastomosis was constructed in a sequential style fashion and the saphenous vein graft was 5.5 mm in diameter and of excellent quality.  This segment of saphenous vein graft was cut to appropriate orientation and length, then tacked to the pericardium with Prolene suture.  Rewarming of the patient was begun  The fifth distal anastomosis was to left anterior descending " "coronary artery which measured 2.25 mm in diameter; the saphenous vein graft was 4.5 mm in diameter and of excellent quality.  This segment of saphenous vein graft was cut to appropriate orientation and length, then tacked to the pericardium with Prolene suture.  The aortic cross-clamp was removed and sinus rhythm was re-established.  A side biting clamp was placed on the ascending aorta and three proximal anastomosis sites were developed with an aortic punch device.  All three anastomoses were constructed with running 6.0 Prolene sutures.  The vein grafts were de-aired in a retrograde fashion, the side biting clamp was removed, and the Prolene sutures were secured.  Radiopaque markers were placed around the proximal anastomosis sites.  Visual inspection of all conduits and anastomoses did not reveal any problematic bleeding.  After adequate rewarming, the patient was weaned from cardiopulmonary bypass without difficulty.  The venous cannula was removed and protamine was given to reverse the heparin.  Two size 28 Greenlandic mediastinal sumps were inserted through inferior stab incisions.  Both sumps were secured to skin to the skin with #2 Silk suture.  A single temporary epicardial pacing wire was placed in the right ventricular mass and exited the left subcostal region where it was secured with a pacing ground lead.  Using a magnetic flow meter, flow was measured through the bypass conduits.  Flow through the sequential style saphenous vein graft to the right posterior descending coronary artery and right inferior ventricular branch was 85 cc/minute.  Flow through the natural "Y" sequential style saphenous vein graft to the ramus intermedius coronary artery and distal left circumflex coronary artery was 88 cc/minute.  Flow through the saphenous vein graft to the left anterior descending coronary artery was 70 cc/minute.  After half of the protamine was administered, the aortic cannula was removed and the site was " triply secured with 4.0 Prolene suture.  Once hemostasis appeared adequate, the pericardium was partially reapproximated with 0 Vicryl sutures.  The sternum was closed with a total of eight sternal wires in the usual fashion.  The presternal fascia was closed with a running 0 Vicryl suture.  The subcuticular layer was closed with 2.0 Vicryl suture, and the skin was closed with skin staples.  Sterile dressings were placed over all operative incisions. The patient was then prepared for transport to the Intensive Care Unit in hemodynamically stable condition, normal sinus rhythm, on no inotropic support for further care.  The total cardiopulmonary bypass duration was 108 minutes and the aortic cross-clamp duration was 64 minutes.  The patient's pre-operative cardiac output was 4.3 liters per minute and the postoperative cardiac output was 7.1 liters per minute.

## 2020-10-06 NOTE — RESPIRATORY THERAPY
Results for CRUZ READ (MRN [id number]) as of 10/6/2020 17:17   Ref. Range 10/6/2020 17:17   POC Sodium Latest Ref Range: 136 - 145 mmol/L 141   POC Potassium Latest Ref Range: 3.5 - 5.1 mmol/L 4.0   POC Glucose Latest Ref Range: 70 - 110 mg/dL 186 (H)   POC Ionized Calcium Latest Ref Range: 1.06 - 1.42 mmol/L 1.21   POC Hematocrit Latest Ref Range: 36 - 54 %PCV 35 (L)   POC PH Latest Ref Range: 7.35 - 7.45  7.238 (LL)   POC PCO2 Latest Ref Range: 35 - 45 mmHg 53.7 (H)   POC PO2 Latest Ref Range: 80 - 100 mmHg 88   POC BE Latest Ref Range: -2 to 2 mmol/L -5   POC HCO3 Latest Ref Range: 24 - 28 mmol/L 22.9 (L)   POC SATURATED O2 Latest Ref Range: 95 - 100 % 95   POC TCO2 Latest Ref Range: 23 - 27 mmol/L 25   FiO2 Unknown 0.40   Vt Unknown 500   PEEP Unknown 5   Sample Unknown ARTERIAL   DelSys Unknown Adult Vent   Allens Test Unknown N/A   Site Unknown Domonique/UAC   Mode Unknown SIMV   Rate Unknown 16   POC pH Temp Unknown 7.247   POC pCO2 Temp Latest Units: mmHg 52.1   POC pO2 Temp Latest Units: mmHg 85   abg results given to LEIGH ANN Bennett.

## 2020-10-06 NOTE — PROGRESS NOTES
"[facility]  Adult Nutrition   Progress Note (Initial Assessment)     SUMMARY     Recommendations  Recommendation/Intervention:   1) Recommend starting nutrition support if unable to advance diet within 48hrs.   2) RD will monitor days NPO, diet advancement/po intake, diet education needs and will make recommendations.    Goals: Patient will received some form of nutrition within 48hrs  Nutrition Goal Status: new    Dietitian Rounds Brief  Pt s/p CABG, intubated and sedated.    Reason for Assessment  Reason For Assessment: identified at risk by screening criteria(ICU status)  Diagnosis: cardiac disease  Relevant Medical History: CAD, HTN    Nutrition Risk Screen  Nutrition Risk Screen: no indicators present    Nutrition/Diet History  Food Allergies: NKFA  Factors Affecting Nutritional Intake: NPO, on mechanical ventilation    Anthropometrics  Temp: 97.5 °F (36.4 °C)  Height Method: Stated  Height: 5' 8" (172.7 cm)  Height (inches): 68 in  Weight Method: Bed Scale  Weight: 81.6 kg (179 lb 14.3 oz)  Weight (lb): 179.9 lb  Ideal Body Weight (IBW), Male: 154 lb  % Ideal Body Weight, Male (lb): 116.82 %  BMI (Calculated): 27.4  BMI Grade: 25 - 29.9 - overweight     Weight History:  Wt Readings from Last 10 Encounters:   10/06/20 81.6 kg (179 lb 14.3 oz)   09/30/20 82.3 kg (181 lb 7 oz)   09/29/20 82.6 kg (182 lb)   09/28/20 81.6 kg (180 lb)   09/25/20 81.6 kg (180 lb)   09/22/20 83 kg (183 lb)   03/16/20 79.4 kg (175 lb)   11/29/19 73 kg (161 lb)     Lab/Procedures/Meds: Pertinent Labs Reviewed    Clinical Chemistry:  Recent Labs   Lab 09/30/20  0358 10/06/20  0614 10/06/20  1334    137 137   K 4.6 4.1 3.5    101 105   CO2 27 28 23   * 125* 152*   BUN 19 18 17   CREATININE 1.0 0.9 1.0   CALCIUM 9.3 9.6 8.5*   PROT 7.3 7.3  --    ALBUMIN 4.3 4.2  --    BILITOT 0.8 0.8  --    ALKPHOS 64 61  --    AST 53* 65*  --    ALT 72* 96*  --    ANIONGAP 8 8 9   ESTGFRAFRICA >60.0 >60.0 >60.0 "   EGFRNONAA >60.0 >60.0 >60.0   MG 2.1 2.0 1.8   PHOS 3.8  --   --      CBC:   Recent Labs   Lab 10/06/20  1334 10/06/20  1511   WBC 14.08*  --    RBC 3.48*  --    HGB 11.0*  --    HCT 32.1* 35*     --    MCV 92  --    MCH 31.6*  --    MCHC 34.3  --      Cardiac Profile:  Recent Labs   Lab 09/30/20  0358   TROPONINI <0.030     Medications: Pertinent Medications reviewed    Scheduled Meds:   ceFAZolin (ANCEF) IVPB  2 g Intravenous Q8H    chlorhexidine  15 mL Mouth/Throat BID    docusate sodium  100 mg Oral BID    midazolam        mupirocin   Nasal BID    potassium chloride  10 mEq Oral Q12H     Continuous Infusions:   sodium chloride 0.9% 1,632 mL (10/06/20 1444)    clevidipine 2 mg/hr (10/06/20 1545)    insulin (HUMAN R) infusion (adults) 1.4 Units/hr (10/06/20 1415)       PRN Meds:.calcium gluconate IVPB, dextrose 50%, dextrose 50%, HYDROcodone-acetaminophen, HYDROcodone-acetaminophen, magnesium sulfate IVPB, midazolam, midazolam, morphine, ondansetron, potassium chloride in water, potassium chloride in water    Estimated/Assessed Needs  Weight Used For Calorie Calculations: 82 kg (180 lb 12.4 oz)  Energy Calorie Requirements (kcal): 2050 (25kcal/kg)  Energy Need Method: Kcal/kg  Protein Requirements: 99-123gm (1.2-1.5gm/kg)  Weight Used For Protein Calculations: 82 kg (180 lb 12.4 oz)     Estimated Fluid Requirement Method: RDA Method  RDA Method (mL): 2050       Nutrition Prescription Ordered  Current Diet Order: NPO    Evaluation of Received Nutrient/Fluid Intake  Energy Calories Required: not meeting needs  Protein Required: not meeting needs  Comments: Pt s/p CABG, intubated and sedated.  Tolerance: other (see comments)(NPO)     Intake/Output Summary (Last 24 hours) at 10/6/2020 1615  Last data filed at 10/6/2020 1305  Gross per 24 hour   Intake 3260 ml   Output 855 ml   Net 2405 ml      % Intake of Estimated Energy Needs: 0 - 25 %  % Meal Intake: NPO    Nutrition Risk  Level of Risk/Frequency  of Follow-up: high     Monitor and Evaluation  Food and Nutrient Intake: energy intake  Food and Nutrient Adminstration: diet order  Knowledge/Beliefs/Attitudes: food and nutrition knowledge/skill  Physical Activity and Function: nutrition-related ADLs and IADLs  Anthropometric Measurements: weight change  Biochemical Data, Medical Tests and Procedures: electrolyte and renal panel, gastrointestinal profile, glucose/endocrine profile, lipid profile  Nutrition-Focused Physical Findings: overall appearance     Nutrition Follow-Up  RD Follow-up?: Yes

## 2020-10-06 NOTE — PLAN OF CARE
Recommend starting nutrition support if unable to advance diet within 48hrs. RD will monitor days NPO and will make recs PRN.

## 2020-10-06 NOTE — ANESTHESIA PROCEDURE NOTES
[place] Flavia Line    Diagnosis: cad  Patient location during procedure: done in OR  Procedure start time: 10/6/2020 7:46 AM  Timeout: 10/6/2020 7:45 AM  Procedure end time: 10/6/2020 8:15 AM    Staffing  Authorizing Provider: Sadiq Kohler MD  Performing Provider: Sadiq Kohler MD    Anesthesiologist was present at the time of the procedure.  Preanesthetic Checklist  Completed: patient identified, site marked, surgical consent, pre-op evaluation, timeout performed, IV checked, risks and benefits discussed, monitors and equipment checked and anesthesia consent given  [place] Flavia Line  Skin Prep: chlorhexidine gluconate  Local Infiltration: lidocaine  Location: right,   Vessel Caliber: small, patent, compressibility normal  Introducer: 9 Fr single lumen,   Device: standard thermodilution catheter  Catheter Size: 8 Fr  Catheter placement by yes. Heme positive aspiration all ports. PAC floated with balloon up not wedgedSterile sheath used  Locked at: 47 cm.Insertion Attempts: 1  Indication: intravenous therapy  Ultrasound Guidance  Needle advanced into vessel with real time Ultrasound guidance.  Image recorded and saved.  Guidewire confirmed in vessel.  Sterile sheath used.  Assessment  Verification: chest x-ray  Dressing: secured with tape and tegaderm  Patient: Tolerated Well

## 2020-10-06 NOTE — ANESTHESIA PROCEDURE NOTES
Central Line    Diagnosis: CAD  Patient location during procedure: done in OR  Procedure start time: 10/6/2020 7:46 AM  Timeout: 10/6/2020 7:45 AM  Procedure end time: 10/6/2020 8:15 AM    Staffing  Authorizing Provider: Sadiq Kohler MD  Performing Provider: Sadiq Kohler MD    Staffing  Anesthesiologist: Sadiq Kohler MD  Performed: anesthesiologist   Anesthesiologist was present at the time of the procedure.  Preanesthetic Checklist  Completed: patient identified, site marked, surgical consent, pre-op evaluation, timeout performed, IV checked, risks and benefits discussed, monitors and equipment checked and anesthesia consent given  Indication   Indication: hemodynamic monitoring, vascular access, med administration     Anesthesia   general anesthesia    Central Line   Skin Prep: skin prepped with ChloraPrep, skin prep agent completely dried prior to procedure  maximum sterile barriers used during central venous catheter insertion  hand hygiene performed prior to central venous catheter insertion  Location: right, internal jugular.   Catheter type: single lumen  Catheter Size: 9 Fr  Ultrasound: vascular probe with ultrasound  Vessel Caliber: small, patent  Vascular Doppler:  not done, compressibility normal  Needle advanced into vessel with real time Ultrasound guidance.  Guidewire confirmed in vessel.  Image recorded and saved.   Manometry: Venous cannualation confirmed by visual estimation of blood vessel pressure using manometry.  Insertion Attempts: 1   Securement:line sutured, chlorhexidine patch and sterile dressing applied    Post-Procedure   Adverse Events:none    Guidewire Guidewire removed intact. Guidewire removed intact, verified with nurse.

## 2020-10-06 NOTE — ANESTHESIA PROCEDURE NOTES
Central Line    Diagnosis: cad  Patient location during procedure: done in OR  Procedure start time: 10/6/2020 7:46 AM  Timeout: 10/6/2020 7:45 AM  Procedure end time: 10/6/2020 8:15 AM    Staffing  Authorizing Provider: Sadiq Kohler MD  Performing Provider: Sadiq Kohler MD    Staffing  Anesthesiologist: Sadiq Kohler MD  Performed: anesthesiologist   Anesthesiologist was present at the time of the procedure.  Preanesthetic Checklist  Completed: patient identified, site marked, surgical consent, pre-op evaluation, timeout performed, IV checked, risks and benefits discussed, monitors and equipment checked and anesthesia consent given  Indication   Indication: hemodynamic monitoring     Anesthesia   general anesthesia    Central Line   Skin Prep: skin prepped with ChloraPrep, skin prep agent completely dried prior to procedure  maximum sterile barriers used during central venous catheter insertion  hand hygiene performed prior to central venous catheter insertion  Location: right, internal jugular.   Catheter type: triple lumen  Catheter Size: 7 Fr  Inserted Catheter Length: 15 cm  Ultrasound: vascular probe with ultrasound  Vessel Caliber: small, patent, compressibility normal  Needle advanced into vessel with real time Ultrasound guidance.  Guidewire confirmed in vessel.  Image recorded and saved.   Manometry: Venous cannualation confirmed by visual estimation of blood vessel pressure using manometry.  Insertion Attempts: 1   Securement:line sutured, chlorhexidine patch and sterile dressing applied    Post-Procedure   Adverse Events:none    Guidewire Guidewire removed intact. Guidewire removed intact, verified with nurse.

## 2020-10-07 LAB
ANION GAP SERPL CALC-SCNC: 9 MMOL/L (ref 8–16)
BASOPHILS # BLD AUTO: 0.01 K/UL (ref 0–0.2)
BASOPHILS NFR BLD: 0.1 % (ref 0–1.9)
BUN SERPL-MCNC: 15 MG/DL (ref 6–20)
CALCIUM SERPL-MCNC: 8.5 MG/DL (ref 8.7–10.5)
CHLORIDE SERPL-SCNC: 106 MMOL/L (ref 95–110)
CO2 SERPL-SCNC: 26 MMOL/L (ref 23–29)
CREAT SERPL-MCNC: 0.9 MG/DL (ref 0.5–1.4)
DIFFERENTIAL METHOD: ABNORMAL
EOSINOPHIL # BLD AUTO: 0 K/UL (ref 0–0.5)
EOSINOPHIL NFR BLD: 0 % (ref 0–8)
ERYTHROCYTE [DISTWIDTH] IN BLOOD BY AUTOMATED COUNT: 12.8 % (ref 11.5–14.5)
EST. GFR  (AFRICAN AMERICAN): >60 ML/MIN/1.73 M^2
EST. GFR  (NON AFRICAN AMERICAN): >60 ML/MIN/1.73 M^2
GLUCOSE SERPL-MCNC: 103 MG/DL (ref 70–110)
GLUCOSE SERPL-MCNC: 105 MG/DL (ref 70–110)
GLUCOSE SERPL-MCNC: 123 MG/DL (ref 70–110)
GLUCOSE SERPL-MCNC: 137 MG/DL (ref 70–110)
GLUCOSE SERPL-MCNC: 138 MG/DL (ref 70–110)
GLUCOSE SERPL-MCNC: 159 MG/DL (ref 70–110)
HCT VFR BLD AUTO: 32.4 % (ref 40–54)
HCT VFR BLD AUTO: 32.4 % (ref 40–54)
HGB BLD-MCNC: 10.8 G/DL (ref 14–18)
IMM GRANULOCYTES # BLD AUTO: 0.05 K/UL (ref 0–0.04)
IMM GRANULOCYTES NFR BLD AUTO: 0.4 % (ref 0–0.5)
LYMPHOCYTES # BLD AUTO: 0.6 K/UL (ref 1–4.8)
LYMPHOCYTES NFR BLD: 4.6 % (ref 18–48)
MAGNESIUM SERPL-MCNC: 2 MG/DL (ref 1.6–2.6)
MCH RBC QN AUTO: 31.6 PG (ref 27–31)
MCHC RBC AUTO-ENTMCNC: 33.3 G/DL (ref 32–36)
MCV RBC AUTO: 95 FL (ref 82–98)
MONOCYTES # BLD AUTO: 1 K/UL (ref 0.3–1)
MONOCYTES NFR BLD: 7.3 % (ref 4–15)
NEUTROPHILS # BLD AUTO: 11.9 K/UL (ref 1.8–7.7)
NEUTROPHILS NFR BLD: 87.6 % (ref 38–73)
NRBC BLD-RTO: 0 /100 WBC
PLATELET # BLD AUTO: 174 K/UL (ref 150–350)
PMV BLD AUTO: 11.2 FL (ref 9.2–12.9)
POTASSIUM SERPL-SCNC: 4.3 MMOL/L (ref 3.5–5.1)
RBC # BLD AUTO: 3.42 M/UL (ref 4.6–6.2)
SODIUM SERPL-SCNC: 141 MMOL/L (ref 136–145)
WBC # BLD AUTO: 13.61 K/UL (ref 3.9–12.7)

## 2020-10-07 PROCEDURE — 25000003 PHARM REV CODE 250

## 2020-10-07 PROCEDURE — C9248 INJ, CLEVIDIPINE BUTYRATE: HCPCS | Performed by: THORACIC SURGERY (CARDIOTHORACIC VASCULAR SURGERY)

## 2020-10-07 PROCEDURE — 80048 BASIC METABOLIC PNL TOTAL CA: CPT

## 2020-10-07 PROCEDURE — 85025 COMPLETE CBC W/AUTO DIFF WBC: CPT

## 2020-10-07 PROCEDURE — 25000003 PHARM REV CODE 250: Performed by: THORACIC SURGERY (CARDIOTHORACIC VASCULAR SURGERY)

## 2020-10-07 PROCEDURE — 36415 COLL VENOUS BLD VENIPUNCTURE: CPT

## 2020-10-07 PROCEDURE — 63600175 PHARM REV CODE 636 W HCPCS: Performed by: THORACIC SURGERY (CARDIOTHORACIC VASCULAR SURGERY)

## 2020-10-07 PROCEDURE — 99900035 HC TECH TIME PER 15 MIN (STAT)

## 2020-10-07 PROCEDURE — 99232 SBSQ HOSP IP/OBS MODERATE 35: CPT | Mod: ,,, | Performed by: INTERNAL MEDICINE

## 2020-10-07 PROCEDURE — 27000221 HC OXYGEN, UP TO 24 HOURS

## 2020-10-07 PROCEDURE — 94761 N-INVAS EAR/PLS OXIMETRY MLT: CPT

## 2020-10-07 PROCEDURE — 99232 PR SUBSEQUENT HOSPITAL CARE,LEVL II: ICD-10-PCS | Mod: ,,, | Performed by: INTERNAL MEDICINE

## 2020-10-07 PROCEDURE — 94799 UNLISTED PULMONARY SVC/PX: CPT

## 2020-10-07 PROCEDURE — 21000000 HC CCU ICU ROOM CHARGE

## 2020-10-07 PROCEDURE — 83735 ASSAY OF MAGNESIUM: CPT

## 2020-10-07 RX ORDER — METOPROLOL SUCCINATE 50 MG/1
50 TABLET, EXTENDED RELEASE ORAL DAILY
Status: DISCONTINUED | OUTPATIENT
Start: 2020-10-07 | End: 2020-10-08

## 2020-10-07 RX ORDER — ASPIRIN 81 MG/1
81 TABLET ORAL DAILY
Status: DISCONTINUED | OUTPATIENT
Start: 2020-10-07 | End: 2020-10-12 | Stop reason: HOSPADM

## 2020-10-07 RX ORDER — SODIUM CHLORIDE 9 MG/ML
INJECTION, SOLUTION INTRAVENOUS CONTINUOUS
Status: DISCONTINUED | OUTPATIENT
Start: 2020-10-07 | End: 2020-10-07

## 2020-10-07 RX ORDER — HEPARIN SODIUM 10000 [USP'U]/ML
INJECTION, SOLUTION INTRAVENOUS; SUBCUTANEOUS
Status: DISCONTINUED | OUTPATIENT
Start: 2020-10-06 | End: 2020-10-07

## 2020-10-07 RX ORDER — PROTAMINE SULFATE 10 MG/ML
INJECTION, SOLUTION INTRAVENOUS
Status: DISCONTINUED | OUTPATIENT
Start: 2020-10-06 | End: 2020-10-07

## 2020-10-07 RX ADMIN — MUPIROCIN: 20 OINTMENT TOPICAL at 08:10

## 2020-10-07 RX ADMIN — CEFAZOLIN SODIUM 2 G: 2 SOLUTION INTRAVENOUS at 12:10

## 2020-10-07 RX ADMIN — CHLORHEXIDINE GLUCONATE 15 ML: 1.2 RINSE ORAL at 12:10

## 2020-10-07 RX ADMIN — POTASSIUM CHLORIDE 10 MEQ: 750 CAPSULE, EXTENDED RELEASE ORAL at 09:10

## 2020-10-07 RX ADMIN — HYDROCODONE BITARTRATE AND ACETAMINOPHEN 1 TABLET: 7.5; 325 TABLET ORAL at 06:10

## 2020-10-07 RX ADMIN — HYDROCODONE BITARTRATE AND ACETAMINOPHEN 1 TABLET: 7.5; 325 TABLET ORAL at 12:10

## 2020-10-07 RX ADMIN — MUPIROCIN: 20 OINTMENT TOPICAL at 12:10

## 2020-10-07 RX ADMIN — DOCUSATE SODIUM 100 MG: 100 CAPSULE, LIQUID FILLED ORAL at 08:10

## 2020-10-07 RX ADMIN — CHLORHEXIDINE GLUCONATE 15 ML: 1.2 RINSE ORAL at 08:10

## 2020-10-07 RX ADMIN — DOCUSATE SODIUM 100 MG: 100 CAPSULE, LIQUID FILLED ORAL at 09:10

## 2020-10-07 RX ADMIN — ONDANSETRON 8 MG: 4 TABLET, ORALLY DISINTEGRATING ORAL at 08:10

## 2020-10-07 RX ADMIN — HYDROCODONE BITARTRATE AND ACETAMINOPHEN 1 TABLET: 7.5; 325 TABLET ORAL at 10:10

## 2020-10-07 RX ADMIN — ASPIRIN 81 MG: 81 TABLET, DELAYED RELEASE ORAL at 12:10

## 2020-10-07 RX ADMIN — SODIUM CHLORIDE: 0.9 INJECTION, SOLUTION INTRAVENOUS at 12:10

## 2020-10-07 RX ADMIN — CHLORHEXIDINE GLUCONATE 15 ML: 1.2 RINSE ORAL at 09:10

## 2020-10-07 RX ADMIN — CLEVIPIDINE 2 MG/HR: 0.5 EMULSION INTRAVENOUS at 12:10

## 2020-10-07 RX ADMIN — MORPHINE SULFATE 2 MG: 2 INJECTION, SOLUTION INTRAMUSCULAR; INTRAVENOUS at 03:10

## 2020-10-07 RX ADMIN — CEFAZOLIN SODIUM 2 G: 2 SOLUTION INTRAVENOUS at 09:10

## 2020-10-07 RX ADMIN — MUPIROCIN: 20 OINTMENT TOPICAL at 09:10

## 2020-10-07 RX ADMIN — METOPROLOL SUCCINATE 50 MG: 50 TABLET, FILM COATED, EXTENDED RELEASE ORAL at 12:10

## 2020-10-07 RX ADMIN — HYDROCODONE BITARTRATE AND ACETAMINOPHEN 1 TABLET: 7.5; 325 TABLET ORAL at 02:10

## 2020-10-07 RX ADMIN — MORPHINE SULFATE 2 MG: 2 INJECTION, SOLUTION INTRAMUSCULAR; INTRAVENOUS at 12:10

## 2020-10-07 RX ADMIN — POTASSIUM CHLORIDE 10 MEQ: 750 CAPSULE, EXTENDED RELEASE ORAL at 08:10

## 2020-10-07 RX ADMIN — SODIUM CHLORIDE 1632 ML: 0.9 INJECTION, SOLUTION INTRAVENOUS at 05:10

## 2020-10-07 RX ADMIN — ONDANSETRON 8 MG: 4 TABLET, ORALLY DISINTEGRATING ORAL at 12:10

## 2020-10-07 NOTE — CONSULTS
[facility]  Cardiology  Consult Note    Patient Name: Louie Wahl  MRN: [id number]  Admission Date: 10/6/2020  Hospital Length of Stay: 0 days  Code Status: Full Code   Attending Provider: Long Garvin MD   Consulting Provider: Angela Dickens MD  Primary Care Physician: Rachel Nuñez NP  Principal Problem:<principal problem not specified>    Patient information was obtained from past medical records.     Consults  Subjective:     REASON FOR CONSULT:  CAD s/p CABG.      HPI:  60-year-old male with a past medical history significant for coronary artery disease, distal aortic arch thrombus, hypertension, dyslipidemia was admitted to the hospital after he had an electively scheduled CABG.  He is currently intubated and no meaningful history could be obtained.  He obey simple commands.  He is currently hemodynamically stable.    Past Medical History:   Diagnosis Date    Anticoagulant long-term use     Atrial flutter 2019    Coronary artery disease     Hyperlipidemia 2009    Hypertension 2009       Past Surgical History:   Procedure Laterality Date    INCISION AND DRAINAGE Right 1964    knee    LEFT HEART CATHETERIZATION Left 9/28/2020    Procedure: Left heart cath;  Surgeon: Angela Dickens MD;  Location[facility] CATH[facility]EP LAB;  Service: Cardiology;  Laterality: Left;    MANDIBLE FRACTURE SURGERY Left 1995    wires in jaw    TRANSESOPHAGEAL ECHOCARDIOGRAPHY      x3       Review of patient's allergies indicates:   Allergen Reactions    Ace inhibitors Hives and Swelling       Current Facility-Administered Medications on File Prior to Encounter   Medication    [DISCONTINUED] 0.9%  NaCl infusion    [DISCONTINUED] clevidipine (CLEVIPREX) infusion 0.5 mg/mL    [DISCONTINUED] etomidate injection    [DISCONTINUED] fentaNYL injection    [DISCONTINUED] insulin regular 100 Units in sodium chloride 0.9% 100 mL infusion    [DISCONTINUED] lactated ringers infusion    [DISCONTINUED]  lidocaine (PF) 20 mg/mL (2%) injection    [DISCONTINUED] midazolam (VERSED) 1 mg/mL injection    [DISCONTINUED] ondansetron injection    [DISCONTINUED] rocuronium injection    [DISCONTINUED] succinylcholine injection    [DISCONTINUED] vecuronium injection     Current Outpatient Medications on File Prior to Encounter   Medication Sig    apixaban (ELIQUIS) 5 mg Tab Take 5 mg by mouth 2 (two) times daily.    aspirin 81 MG Chew Take 81 mg by mouth once daily.    clopidogreL (PLAVIX) 75 mg tablet Take 75 mg by mouth once daily.    multivit-minerals/folic acid (SPECTRAVITE ADULT ORAL) Take by mouth.    omega-3 fatty acids/fish oil (FISH OIL-OMEGA-3 FATTY ACIDS) 300-1,000 mg capsule Take 1 capsule by mouth 2 (two) times a day.    atorvastatin (LIPITOR) 80 MG tablet Take 80 mg by mouth every evening.     isosorbide mononitrate (IMDUR) 30 MG 24 hr tablet TAKE 1 TABLET BY MOUTH EVERY DAY (Patient taking differently: Take 30 mg by mouth once daily. )    metoprolol succinate (TOPROL-XL) 50 MG 24 hr tablet Take 50 mg by mouth. 50mg every morning, 25mg at night    pantoprazole (PROTONIX) 40 MG tablet Take 40 mg by mouth once daily.       Scheduled Meds:   ceFAZolin (ANCEF) IVPB  2 g Intravenous Q8H    chlorhexidine  15 mL Mouth/Throat BID    docusate sodium  100 mg Oral BID    mupirocin   Nasal BID    potassium chloride  10 mEq Oral Q12H     Continuous Infusions:   sodium chloride 0.9% 1,632 mL (10/06/20 1444)    clevidipine 2 mg/hr (10/06/20 1736)    insulin (HUMAN R) infusion (adults) 2.2 Units/hr (10/06/20 1600)     PRN Meds:.calcium gluconate IVPB, dextrose 50%, dextrose 50%, HYDROcodone-acetaminophen, HYDROcodone-acetaminophen, magnesium sulfate IVPB, midazolam, midazolam, morphine, ondansetron, potassium chloride in water, potassium chloride in water    Family History     Problem Relation (Age of Onset)    Heart disease Mother, Father    Heart failure Mother, Father          Tobacco Use    Smoking  status: Former Smoker     Packs/day: 0.50     Years: 45.00     Pack years: 22.50     Quit date: 2020     Years since quittin.0    Smokeless tobacco: Never Used    Tobacco comment: 1 pack per month   Substance and Sexual Activity    Alcohol use: Not Currently     Comment: Social    Drug use: Not Currently     Types: Marijuana    Sexual activity: Not Currently       ROS   Unobtainable.  Objective:     Vital Signs (Most Recent):  Temp: 98.1 °F (36.7 °C) (10/06/20 2100)  Pulse: 107 (10/06/20 2100)  Resp: 17 (10/06/20 2100)  BP: 122/61 (10/06/20 2100)  SpO2: 96 % (10/06/20 2100) Vital Signs (24h Range):  Temp:  [97.3 °F (36.3 °C)-98.6 °F (37 °C)] 98.1 °F (36.7 °C)  Pulse:  [] 107  Resp:  [12-39] 17  SpO2:  [95 %-100 %] 96 %  BP: ()/(55-81) 122/61  Arterial Line BP: (111-211)/(51-67) 131/58     Weight: 81.6 kg (179 lb 14.3 oz)  Body mass index is 27.35 kg/m².    SpO2: 96 %  O2 Device (Oxygen Therapy): ventilator      Intake/Output Summary (Last 24 hours) at 10/6/2020 2125  Last data filed at 10/6/2020 2000  Gross per 24 hour   Intake 4596.31 ml   Output 3220 ml   Net 1376.31 ml       Lines/Drains/Airways     Central Venous Catheter Line                 Percutaneous Central Line Insertion/Assessment - Cordis 10/06/20 0746 less than 1 day    Percutaneous Central Line Insertion/Assessment - Triple Lumen  10/06/20 0746 less than 1 day    Percutaneous Central Line Insertion/Assessment - single lumen  10/06/20 0746 less than 1 day    Pulmonary Artery Catheter Assessment  10/06/20 0746 less than 1 day          Drain                 Chest Tube 10/06/20 2 Anterior Mediastinal;Pericardial 28 Fr. less than 1 day         NG/OG Tube 10/06/20 1713 Left nostril less than 1 day         Urethral Catheter 10/06/20 Double-lumen 16 Fr. less than 1 day          Airway                 Airway - Non-Surgical 10/06/20 0738 Endotracheal Tube less than 1 day          Arterial Line            Arterial Line 10/06/20 0646 Left  Radial less than 1 day          Peripheral Intravenous Line                 Peripheral IV - Single Lumen 10/06/20 0600 20 G Left Antecubital less than 1 day                Physical Exam  HEENT: Normocephalic, atraumatic, PERRL, Conjunctiva pink, no scleral icterus.   CVS: S1S2+, RRR, no murmurs, rubs or gallops, JVP: Normal. RIJ [place]avia in place.   LUNGS: Clear  ABDOMEN: Soft, NT, BS+  EXTREMITIES: No cyanosis, clubbing or edema  NEURO:  Obeys simple commands.   STERNOTOMY: C/D/I    Significant Labs:   BMP:   Recent Labs   Lab 10/06/20  0614 10/06/20  1334 10/06/20  1954   * 152* 240*    137 137   K 4.1 3.5 4.4    105 106   CO2 28 23 17*   BUN 18 17 16   CREATININE 0.9 1.0 1.1   CALCIUM 9.6 8.5* 8.3*   MG 2.0 1.8 2.1   , CMP   Recent Labs   Lab 10/06/20  0614 10/06/20  1334 10/06/20  1954    137 137   K 4.1 3.5 4.4    105 106   CO2 28 23 17*   * 152* 240*   BUN 18 17 16   CREATININE 0.9 1.0 1.1   CALCIUM 9.6 8.5* 8.3*   PROT 7.3  --   --    ALBUMIN 4.2  --   --    BILITOT 0.8  --   --    ALKPHOS 61  --   --    AST 65*  --   --    ALT 96*  --   --    ANIONGAP 8 9 14   ESTGFRAFRICA >60.0 >60.0 >60.0   EGFRNONAA >60.0 >60.0 >60.0   , CBC   Recent Labs   Lab 10/06/20  0614  10/06/20  1334  10/06/20  1954 10/06/20  2000   WBC 7.24  --  14.08*  --  20.00*  --    HGB 13.2*  --  11.0*  --  12.5*  --    HCT 39.6*   < > 32.1*   < > 38.0* 38     --  160  --  231  --     < > = values in this interval not displayed.   , INR   Recent Labs   Lab 10/06/20  0614 10/06/20  1334   INR 1.1 1.5   , Lipid Panel No results for input(s): CHOL, HDL, LDLCALC, TRIG, CHOLHDL in the last 48 hours. and Troponin No results for input(s): TROPONINI in the last 48 hours.    Significant Imaging: Reviewed  Assessment and Plan:     IMPRESSION:  CAD. 60% distal left main, sever proximal to mid LCx and pRCA lesions.   S/p CABG X 5 on 10/6/2020 by Dr Garvin(SVG to LAD, sequential SVG to rPDA/inferior  ventricular branch, natural Y sequential SVG to Ramus and distal LCx). Hemodynamically stable.   Distal aortic arch thrombus. Resolved on DIONISIO done in 3/2020.   Hypertension.   Dyslipidemia.   Anemia. Post op. Recent H&H acceptable.   Abdominal pain/Constipation. Improved.      RECOMMENDATIONS:  1. Continue supportve care.   2. Wean vent and extubate as feasible.   3. Resume home meds when feasible.     Thank you for your consult. I will follow-up with patient. Please contact us if you have any additional questions.    Angela Dickens MD  Cardiology   [facility]

## 2020-10-07 NOTE — PLAN OF CARE
Pt has SO to care for him when going home. Pt should have no cm needs. Pharmacy is [facility] [address] Uses NP for PCP Rachel Nuñez. Cm to follow until discharge from hospital.     10/07/20 0904   Discharge Assessment   Assessment Type Discharge Planning Assessment   Confirmed/corrected address and phone number on facesheet? Yes   Assessment information obtained from? Patient   Communicated expected length of stay with patient/caregiver no   Prior to hospitilization cognitive status: Alert/Oriented   Prior to hospitalization functional status: Independent  (Has a walker if needed)   Current cognitive status: Alert/Oriented   Current Functional Status: Needs Assistance   Lives With significant other   Able to Return to Prior Arrangements yes   Is patient able to care for self after discharge? Yes   Who are your caregiver(s) and their phone number(s)? Macie De La Rosa  SO  [phone]   Patient's perception of discharge disposition home or selfcare   Readmission Within the Last 30 Days current reason for admission unrelated to previous admission   If yes, most recent facility name: [facility]   Patient currently being followed by outpatient case management? No   Patient currently receives any other outside agency services? No   Equipment Currently Used at Home walker, rolling   Part D Coverage Medicaid   Do you have any problems affording any of your prescribed medications? No   Is the patient taking medications as prescribed? yes   Does the patient have transportation home? Yes   Transportation Anticipated family or friend will provide   Dialysis Name and Scheduled days N/A   Does the patient receive services at the [facility]mad[facility] Clinic? No   Discharge Plan A Home   Discharge Plan B Home   DME Needed Upon Discharge  none   Patient/Family in Agreement with Plan yes   Readmission Questionnaire   At the time of your discharge, did someone talk to you about what your health problems were? Yes   At the time of discharge, did someone  talk to you about what to watch out for regarding worsening of your health problem? Yes   At the time of discharge, did someone talk to you about what to do if you experienced worsening of your health problem? Yes   At the time of discharge, did someone talk to you about which medication to take when you left the hospital and which ones to stop taking? Yes   At the time of discharge, did someone talk to you about when and where to follow up with a doctor after you left the hospital? Yes   What do you believe caused you to be sick enough to be re-admitted? Elective surgery   How often do you need to have someone help you when you read instructions, pamphlets, or other written material from your doctor or pharmacy? Never   Do you have problems taking your medications as prescribed? Yes   Do you have any problems affording any of  your prescribed medications? No   Do you have problems obtaining/receiving your medications? No   Does the patient have transportation to healthcare appointments? Yes   Does the patient have family/friends to help with healtcare needs after discharge? yes   Does your caregiver provide all the help you need? Yes   Are you currently feeling confused? No   Are you currently having problems thinking? No   Are you currently having memory problems? Yes   Have you felt down, depressed, or hopeless? 0   In the last 7 days, my sleep quality was: fair

## 2020-10-07 NOTE — PLAN OF CARE
10/07/20 0700   Patient Assessment/Suction   Level of Consciousness (AVPU) alert   Respiratory Effort Normal;Unlabored   Expansion/Accessory Muscles/Retractions expansion symmetric   All Lung Fields Breath Sounds clear   PRE-TX-O2   O2 Device (Oxygen Therapy) nasal cannula   $ Is the patient on Low Flow Oxygen? Yes   Flow (L/min) 2   Oxygen Concentration (%) 28   SpO2 98 %   Pulse Oximetry Type Continuous   $ Pulse Oximetry - Multiple Charge Pulse Oximetry - Multiple   Pulse 98   Resp 18   /65   Incentive Spirometer   $ Incentive Spirometer Charges done with encouragement;proper technique demonstrated;postop instruction;ready for self-administration   Incentive Spirometer Predicted Level (mL) 2500   Number of Repetitions (IS) 10   Level Incentive Spirometer (mL) 1500   Patient Tolerance (IS) good;no adverse signs/symptoms present   Ready to Wean/Extubation Screen   FIO2<=50 (chart decimal) 0.28   patient off ventilator on 2lnc doing 1500 on I.S. will continue use on own

## 2020-10-07 NOTE — PLAN OF CARE
Problem: Adult Inpatient Plan of Care  Goal: Plan of Care Review  Outcome: Ongoing, Progressing  Goal: Patient-Specific Goal (Individualization)  Outcome: Ongoing, Progressing  Goal: Absence of Hospital-Acquired Illness or Injury  Outcome: Ongoing, Progressing  Goal: Optimal Comfort and Wellbeing  Outcome: Ongoing, Progressing  Goal: Readiness for Transition of Care  Outcome: Ongoing, Progressing  Goal: Rounds/Family Conference  Outcome: Ongoing, Progressing     Problem: Infection  Goal: Infection Symptom Resolution  Outcome: Ongoing, Progressing     Problem: Skin Injury Risk Increased  Goal: Skin Health and Integrity  Outcome: Ongoing, Progressing     Problem: Oral Intake Inadequate  Goal: Improved Oral Intake  Outcome: Ongoing, Progressing     Problem: Communication Impairment (Mechanical Ventilation, Invasive)  Goal: Effective Communication  Outcome: Ongoing, Progressing     Problem: Device-Related Complication Risk (Mechanical Ventilation, Invasive)  Goal: Optimal Device Function  Outcome: Ongoing, Progressing     Problem: Inability to Wean (Mechanical Ventilation, Invasive)  Goal: Mechanical Ventilation Liberation  Outcome: Ongoing, Progressing     Problem: Nutrition Impairment (Mechanical Ventilation, Invasive)  Goal: Optimal Nutrition Delivery  Outcome: Ongoing, Progressing     Problem: Skin and Tissue Injury (Mechanical Ventilation, Invasive)  Goal: Absence of Device-Related Skin and Tissue Injury  Outcome: Ongoing, Progressing     Problem: Ventilator-Induced Lung Injury (Mechanical Ventilation, Invasive)  Goal: Absence of Ventilator-Induced Lung Injury  Outcome: Ongoing, Progressing     Problem: Communication Impairment (Artificial Airway)  Goal: Effective Communication  Outcome: Ongoing, Progressing     Problem: Device-Related Complication Risk (Artificial Airway)  Goal: Optimal Device Function  Outcome: Ongoing, Progressing     Problem: Skin and Tissue Injury (Artificial Airway)  Goal: Absence of  Device-Related Skin or Tissue Injury  Outcome: Ongoing, Progressing     Problem: Noninvasive Ventilation Acute  Goal: Effective Unassisted Ventilation and Oxygenation  Outcome: Ongoing, Progressing     Problem: Fall Injury Risk  Goal: Absence of Fall and Fall-Related Injury  Outcome: Ongoing, Progressing     Problem: Restraint, Nonbehavioral (Nonviolent)  Goal: Discontinuation Criteria Achieved  Outcome: Ongoing, Progressing  Goal: Personal Dignity and Safety Maintained  Outcome: Ongoing, Progressing

## 2020-10-07 NOTE — PROGRESS NOTES
Cardiac Rehab     Louie ZEFERINO Wahl   [id number]   10/7/2020         Cardiac Rehab Phase Taught: Phase 1    Teaching Method: Verbal    Handouts: None    Educational Videos: None    Understanding:  Learning indicated by feedback and Verbalize understanding    Comments: pt in bed, wife at bedside. Review of postop cabg education including activity,IS and sternal precautions. Pt voiced understanding. Will follow for further education    Total Time Spent:15mins            Raine Camacho RN

## 2020-10-07 NOTE — ANESTHESIA POSTPROCEDURE EVALUATION
Anesthesia Post Evaluation    Patient: Louie Wahl    Procedure(s) Performed: Procedure(s) (LRB):  CORONARY ARTERY BYPASS GRAFT (CABG) (N/A)  SURGICAL PROCUREMENT, VEIN, ENDOSCOPIC (Left)    Final Anesthesia Type: general    Patient location during evaluation: ICU  Patient participation: Yes- Able to Participate  Level of consciousness: awake and alert  Post-procedure vital signs: reviewed and stable  Pain management: adequate  Airway patency: patent    PONV status at discharge: No PONV  Anesthetic complications: no      Cardiovascular status: blood pressure returned to baseline and stable  Respiratory status: unassisted and room air  Hydration status: euvolemic  Follow-up not needed.  Comments: Patient status post coronary artery bypass surgery. The patient is extubated, is awake and alert, and he reports good pain control.  He has no nausea vomiting.  His airway patent, he has no breathing problems. He denies any surgical recall. His neurological exam is at baseline. He is hemodynamically stable with no vasopressors. Has adequate urine output. There were no complications to general anesthesia for coronary artery bypass surgery.            Vitals Value Taken Time   /79 10/07/20 0803   Temp 37.6 °C (99.7 °F) 10/07/20 0400   Pulse 98 10/07/20 0824   Resp 18 10/07/20 0824   SpO2 95 % 10/07/20 0824   Vitals shown include unvalidated device data.      No case tracking events are documented in the log.      Pain/Brent Score: Pain Rating Prior to Med Admin: 7 (10/7/2020  3:58 AM)  Pain Rating Post Med Admin: 3 (10/7/2020  4:28 AM)

## 2020-10-07 NOTE — PROGRESS NOTES
POD #1         The patient is doing well clinically.  He is extubated with satisfactory hemodynamics and neurologic status.  His cardiac output is 7 liters/minute.  He has mild sinus tachycardia.  His chest x-ray is satisfactory.  His mediastinal sump drainage has been 100 cc and 250 cc so far.  He has excellent urine output.    Chest - Clear; incision dry  Ext - Mild peripheral edema    Hgb = 10.8  Plat = 174,000  Bun = 15  Cr = 0.9    Assessment:  Coronary artery disease                          Status post aorto coronary bypass grafting x 5 vessels; hemodynamically stable                          Acute anemia secondary to expected blood loss of surgery                          History of tobacco abuse                          History of aortic arch thrombus; resolved                          Chronic lower back pain    Plan: Discontinue mediastinal sumps and transfer to Cardiology A Unit later today           Discontinue Johnson catheter, arterial line, [place] catheter           Begin beta-blocker therapy           Progressive activity as tolerated

## 2020-10-07 NOTE — PROGRESS NOTES
Cardiac Rehab     Louie ZEFERINO Wahl   [id number]   10/7/2020         Cardiac Rehab Phase Taught: Phase 1    Teaching Method: Verbal    Handouts: None    Educational Videos: None    Understanding:  Learning indicated by feedback and Verbalize understanding    Comments: pt in bed, wife at side. Pt states he feels well. Review of postop cabg education including sternal precautions, IS, activity, pain control, diet. Pt voiced understanding. Spoke with bedside RN, updated on plan of care. Will follow    Total Time Spent:15mins            Raine Camacho RN

## 2020-10-07 NOTE — NURSING
Patient assessment completed at this time. Patient resting quietly on the vent. Will continue to monitor closely.

## 2020-10-08 LAB
ANION GAP SERPL CALC-SCNC: 11 MMOL/L (ref 8–16)
BASOPHILS # BLD AUTO: 0.02 K/UL (ref 0–0.2)
BASOPHILS NFR BLD: 0.1 % (ref 0–1.9)
BUN SERPL-MCNC: 18 MG/DL (ref 6–20)
CALCIUM SERPL-MCNC: 8.8 MG/DL (ref 8.7–10.5)
CHLORIDE SERPL-SCNC: 99 MMOL/L (ref 95–110)
CO2 SERPL-SCNC: 28 MMOL/L (ref 23–29)
CREAT SERPL-MCNC: 0.9 MG/DL (ref 0.5–1.4)
DIFFERENTIAL METHOD: ABNORMAL
EOSINOPHIL # BLD AUTO: 0 K/UL (ref 0–0.5)
EOSINOPHIL NFR BLD: 0 % (ref 0–8)
ERYTHROCYTE [DISTWIDTH] IN BLOOD BY AUTOMATED COUNT: 13.1 % (ref 11.5–14.5)
EST. GFR  (AFRICAN AMERICAN): >60 ML/MIN/1.73 M^2
EST. GFR  (NON AFRICAN AMERICAN): >60 ML/MIN/1.73 M^2
GLUCOSE SERPL-MCNC: 140 MG/DL (ref 70–110)
HCT VFR BLD AUTO: 33.5 % (ref 40–54)
HGB BLD-MCNC: 11.1 G/DL (ref 14–18)
IMM GRANULOCYTES # BLD AUTO: 0.13 K/UL (ref 0–0.04)
IMM GRANULOCYTES NFR BLD AUTO: 0.6 % (ref 0–0.5)
LYMPHOCYTES # BLD AUTO: 2.1 K/UL (ref 1–4.8)
LYMPHOCYTES NFR BLD: 9.2 % (ref 18–48)
MCH RBC QN AUTO: 31.7 PG (ref 27–31)
MCHC RBC AUTO-ENTMCNC: 33.1 G/DL (ref 32–36)
MCV RBC AUTO: 96 FL (ref 82–98)
MONOCYTES # BLD AUTO: 1.8 K/UL (ref 0.3–1)
MONOCYTES NFR BLD: 7.9 % (ref 4–15)
NEUTROPHILS # BLD AUTO: 18.3 K/UL (ref 1.8–7.7)
NEUTROPHILS NFR BLD: 82.2 % (ref 38–73)
NRBC BLD-RTO: 0 /100 WBC
PLATELET # BLD AUTO: 170 K/UL (ref 150–350)
PMV BLD AUTO: 11.2 FL (ref 9.2–12.9)
POTASSIUM SERPL-SCNC: 4.1 MMOL/L (ref 3.5–5.1)
RBC # BLD AUTO: 3.5 M/UL (ref 4.6–6.2)
SODIUM SERPL-SCNC: 138 MMOL/L (ref 136–145)
WBC # BLD AUTO: 22.26 K/UL (ref 3.9–12.7)

## 2020-10-08 PROCEDURE — 94761 N-INVAS EAR/PLS OXIMETRY MLT: CPT

## 2020-10-08 PROCEDURE — 93797 PHYS/QHP OP CAR RHAB WO ECG: CPT

## 2020-10-08 PROCEDURE — 25000003 PHARM REV CODE 250: Performed by: INTERNAL MEDICINE

## 2020-10-08 PROCEDURE — 85025 COMPLETE CBC W/AUTO DIFF WBC: CPT

## 2020-10-08 PROCEDURE — 63600175 PHARM REV CODE 636 W HCPCS: Performed by: THORACIC SURGERY (CARDIOTHORACIC VASCULAR SURGERY)

## 2020-10-08 PROCEDURE — 25000003 PHARM REV CODE 250

## 2020-10-08 PROCEDURE — 21000000 HC CCU ICU ROOM CHARGE

## 2020-10-08 PROCEDURE — 27000221 HC OXYGEN, UP TO 24 HOURS

## 2020-10-08 PROCEDURE — 99232 PR SUBSEQUENT HOSPITAL CARE,LEVL II: ICD-10-PCS | Mod: ,,, | Performed by: INTERNAL MEDICINE

## 2020-10-08 PROCEDURE — 99232 SBSQ HOSP IP/OBS MODERATE 35: CPT | Mod: ,,, | Performed by: INTERNAL MEDICINE

## 2020-10-08 PROCEDURE — 80048 BASIC METABOLIC PNL TOTAL CA: CPT

## 2020-10-08 PROCEDURE — 25000003 PHARM REV CODE 250: Performed by: THORACIC SURGERY (CARDIOTHORACIC VASCULAR SURGERY)

## 2020-10-08 PROCEDURE — 99900035 HC TECH TIME PER 15 MIN (STAT)

## 2020-10-08 RX ORDER — METOPROLOL SUCCINATE 50 MG/1
50 TABLET, EXTENDED RELEASE ORAL 2 TIMES DAILY
Status: DISCONTINUED | OUTPATIENT
Start: 2020-10-08 | End: 2020-10-12 | Stop reason: HOSPADM

## 2020-10-08 RX ORDER — POLYETHYLENE GLYCOL 3350 17 G/17G
17 POWDER, FOR SOLUTION ORAL DAILY
Status: DISCONTINUED | OUTPATIENT
Start: 2020-10-08 | End: 2020-10-12 | Stop reason: HOSPADM

## 2020-10-08 RX ORDER — ATORVASTATIN CALCIUM 40 MG/1
80 TABLET, FILM COATED ORAL NIGHTLY
Status: DISCONTINUED | OUTPATIENT
Start: 2020-10-08 | End: 2020-10-12 | Stop reason: HOSPADM

## 2020-10-08 RX ORDER — LISINOPRIL 2.5 MG/1
2.5 TABLET ORAL DAILY
Status: DISCONTINUED | OUTPATIENT
Start: 2020-10-08 | End: 2020-10-08

## 2020-10-08 RX ADMIN — ONDANSETRON 8 MG: 4 TABLET, ORALLY DISINTEGRATING ORAL at 04:10

## 2020-10-08 RX ADMIN — PROMETHAZINE HYDROCHLORIDE 25 MG: 25 INJECTION INTRAMUSCULAR; INTRAVENOUS at 10:10

## 2020-10-08 RX ADMIN — HYDROCODONE BITARTRATE AND ACETAMINOPHEN 1 TABLET: 7.5; 325 TABLET ORAL at 04:10

## 2020-10-08 RX ADMIN — POTASSIUM CHLORIDE 10 MEQ: 750 CAPSULE, EXTENDED RELEASE ORAL at 09:10

## 2020-10-08 RX ADMIN — POTASSIUM CHLORIDE 10 MEQ: 750 CAPSULE, EXTENDED RELEASE ORAL at 08:10

## 2020-10-08 RX ADMIN — DOCUSATE SODIUM 100 MG: 100 CAPSULE, LIQUID FILLED ORAL at 09:10

## 2020-10-08 RX ADMIN — ONDANSETRON 8 MG: 4 TABLET, ORALLY DISINTEGRATING ORAL at 07:10

## 2020-10-08 RX ADMIN — ASPIRIN 81 MG: 81 TABLET, DELAYED RELEASE ORAL at 08:10

## 2020-10-08 RX ADMIN — HYDROCODONE BITARTRATE AND ACETAMINOPHEN 1 TABLET: 7.5; 325 TABLET ORAL at 03:10

## 2020-10-08 RX ADMIN — POLYETHYLENE GLYCOL 3350 17 G: 17 POWDER, FOR SOLUTION ORAL at 09:10

## 2020-10-08 RX ADMIN — CHLORHEXIDINE GLUCONATE 15 ML: 1.2 RINSE ORAL at 08:10

## 2020-10-08 RX ADMIN — HYDROCODONE BITARTRATE AND ACETAMINOPHEN 1 TABLET: 7.5; 325 TABLET ORAL at 09:10

## 2020-10-08 RX ADMIN — DOCUSATE SODIUM 100 MG: 100 CAPSULE, LIQUID FILLED ORAL at 08:10

## 2020-10-08 RX ADMIN — MUPIROCIN: 20 OINTMENT TOPICAL at 09:10

## 2020-10-08 RX ADMIN — METOPROLOL SUCCINATE 50 MG: 50 TABLET, FILM COATED, EXTENDED RELEASE ORAL at 08:10

## 2020-10-08 RX ADMIN — METOPROLOL SUCCINATE 50 MG: 50 TABLET, FILM COATED, EXTENDED RELEASE ORAL at 09:10

## 2020-10-08 NOTE — PROGRESS NOTES
Cardiac Rehab     Louie Wahl   [id number]   10/8/2020         Cardiac Rehab Phase Taught: Phase 1    Teaching Method: Verbal, Audio/Visual    Handouts: None    Educational Videos: Recovery - Your First Few Days    Understanding:  Learning indicated by feedback, Needs further review and Verbalize understanding    Comments: pt in bed, states he will get up later. Pt anxious about IS, abdominal gas, sleep, diet. Spoke with pt about postop CABG, suggested recovery CABG video. Spoke with bedside RN. Review of sternal precautions, IS, activity, exercise, diet, pt voiced understanding. Will follow for further education    Total Time Spent:20mins            Raine Camacho RN
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
## 2020-10-08 NOTE — PROGRESS NOTES
Cardiac Rehab     Louie ZEFERINO Wahl   [id number]   10/8/2020         Cardiac Rehab Phase Taught: Phase 1    Teaching Method: Verbal    Handouts: None    Educational Videos: None    Understanding:  Learning indicated by feedback and Verbalize understanding    Comments: pt in bed, states he is tired, will walk later. Spoke with bedside RN. Will follow for further education    Total Time Spent:15mins            Raine Camacho RN

## 2020-10-08 NOTE — CARE UPDATE
This note also relates to the following rows which could not be included:  Pulse - Cannot attach notes to unvalidated device data  Resp - Cannot attach notes to unvalidated device data       10/08/20 0736   Patient Assessment/Suction   Level of Consciousness (AVPU) alert   Respiratory Effort Normal;Unlabored   Expansion/Accessory Muscles/Retractions no use of accessory muscles   All Lung Fields Breath Sounds clear   Rhythm/Pattern, Respiratory unlabored   PRE-TX-O2   O2 Device (Oxygen Therapy) nasal cannula   $ Is the patient on Low Flow Oxygen? Yes   Flow (L/min) 2   SpO2 (!) 90 %   Pulse Oximetry Type Continuous   $ Pulse Oximetry - Multiple Charge Pulse Oximetry - Multiple   Respiratory Evaluation   $ Care Plan Tech Time 15 min

## 2020-10-08 NOTE — NURSING
[id number]  Wife at bedside.  Pt c/o of mod pain with movement from sicu. bp currently elevated.  Pacer wires intact.  drsgs to cw and l lower ext maintained. tlc to r subclavian cdi.  02 at 2l.

## 2020-10-08 NOTE — PROGRESS NOTES
POD #2         The patient looks good sitting up in a chair.  He ambulated twice around the nurse's station earlier today.  He has had excellent urine output.  He voices no major complaints.  He acknowledges flatus but no bowel movement yet.  His blood pressures up to 166 at this time.  He remains in sinus rhythm.      Lungs - Clear bilaterally  Cor - RRR  Ext - No edema  Wounds - Dressing C/D/I    Hgb = 11.1  Cr = 0.9  Plats = 170K      Assessment:  Status post CABG times 5 vessels; doing well     Plan:  Increase beta-blocker therapy and add low-dose lisinopril            Progressive activity

## 2020-10-08 NOTE — RESPIRATORY THERAPY
10/07/20 1950   PRE-TX-O2   O2 Device (Oxygen Therapy) room air   SpO2 98 %   Pulse Oximetry Type Continuous   $ Pulse Oximetry - Multiple Charge Pulse Oximetry - Multiple   Pulse 94   Resp 19

## 2020-10-08 NOTE — PROGRESS NOTES
[facility] hospital  Cardiology  Progress Note    Patient Name: Louie Wahl  MRN: [id number]  Admission Date: 10/6/2020  Hospital Length of Stay: 1 days  Code Status: Full Code   Attending Physician: Long Garvin MD   Primary Care Physician: Rachel Nuñez NP  Expected Discharge Date:   Principal Problem:Coronary artery disease of native artery of native heart with stable angina pectoris    Subjective:       Interval History: Feels good. He reports the pain is tolerable. Denies any shortness of breath. Telemetry shows sinus rhythm.     ROS  Objective:     Vital Signs (Most Recent):  Temp: 99.3 °F (37.4 °C) (10/07/20 1902)  Pulse: 88 (10/07/20 2100)  Resp: 20 (10/07/20 2100)  BP: (!) 141/72 (10/07/20 2100)  SpO2: 97 % (10/07/20 2100) Vital Signs (24h Range):  Temp:  [97.8 °F (36.6 °C)-99.7 °F (37.6 °C)] 99.3 °F (37.4 °C)  Pulse:  [] 88  Resp:  [5-29] 20  SpO2:  [88 %-99 %] 97 %  BP: (102-158)/(58-84) 141/72  Arterial Line BP: (135-186)/() 152/123     Weight: 81.6 kg (179 lb 14.3 oz)  Body mass index is 27.35 kg/m².    SpO2: 97 %  O2 Device (Oxygen Therapy): nasal cannula      Intake/Output Summary (Last 24 hours) at 10/7/2020 2157  Last data filed at 10/7/2020 2000  Gross per 24 hour   Intake 700.33 ml   Output 3525 ml   Net -2824.67 ml       Lines/Drains/Airways     Central Venous Catheter Line            Percutaneous Central Line Insertion/Assessment - single lumen  10/06/20 0746 1 day                Scheduled Meds:   aspirin  81 mg Oral Daily    chlorhexidine  15 mL Mouth/Throat BID    docusate sodium  100 mg Oral BID    metoprolol succinate  50 mg Oral Daily    mupirocin   Nasal BID    potassium chloride  10 mEq Oral Q12H     Continuous Infusions:  PRN Meds:.dextrose 50%, dextrose 50%, HYDROcodone-acetaminophen, HYDROcodone-acetaminophen, morphine, ondansetron     Physical Exam  HEENT: Normocephalic, atraumatic, PERRL, Conjunctiva pink, no scleral icterus.   CVS: S1S2+, RRR, no  murmurs, rubs or gallops, JVP: Normal. RIJ CVL in place.   LUNGS: Clear  ABDOMEN: Soft, NT, BS+  EXTREMITIES: No cyanosis, clubbing or edema  NEURO: AAO X 3.   STERNOTOMY: C/D/I    Significant Labs:   BMP:   Recent Labs   Lab 10/06/20  1334 10/06/20  1954 10/07/20  0343   * 240* 105    137 141   K 3.5 4.4 4.3    106 106   CO2 23 17* 26   BUN 17 16 15   CREATININE 1.0 1.1 0.9   CALCIUM 8.5* 8.3* 8.5*   MG 1.8 2.1 2.0   , CMP   Recent Labs   Lab 10/06/20  0614 10/06/20  1334 10/06/20  1954 10/07/20  0343    137 137 141   K 4.1 3.5 4.4 4.3    105 106 106   CO2 28 23 17* 26   * 152* 240* 105   BUN 18 17 16 15   CREATININE 0.9 1.0 1.1 0.9   CALCIUM 9.6 8.5* 8.3* 8.5*   PROT 7.3  --   --   --    ALBUMIN 4.2  --   --   --    BILITOT 0.8  --   --   --    ALKPHOS 61  --   --   --    AST 65*  --   --   --    ALT 96*  --   --   --    ANIONGAP 8 9 14 9   ESTGFRAFRICA >60.0 >60.0 >60.0 >60.0   EGFRNONAA >60.0 >60.0 >60.0 >60.0   , CBC   Recent Labs   Lab 10/06/20  1334  10/06/20  1954  10/07/20  0343   WBC 14.08*  --  20.00*  --  13.61*   HGB 11.0*  --  12.5*  --  10.8*   HCT 32.1*   < > 38.0*   < > 32.4*  32.4*     --  231  --  174    < > = values in this interval not displayed.   , INR   Recent Labs   Lab 10/06/20  0614 10/06/20  1334   INR 1.1 1.5   , Lipid Panel No results for input(s): CHOL, HDL, LDLCALC, TRIG, CHOLHDL in the last 48 hours. and Troponin No results for input(s): TROPONINI in the last 48 hours.    Significant Imaging: Reviewed  Assessment and Plan:     IMPRESSION:  CAD. 60% distal left main, sever proximal to mid LCx and pRCA lesions.   S/p CABG X 5 on 10/6/2020 by Dr Garvin(SVG to LAD, sequential SVG to rPDA/inferior ventricular branch, natural Y sequential SVG to Ramus and distal LCx). Hemodynamically stable.   Disctal aortic arch thrombus. Resolved on DIONISIO done in 3/2020.   Hypertension.   Dyslipidemia.   Anemia. Post op. H&H acceptable.       PLAN:  1. Continue  current management.   2. Transfer to floor.         Angela Dickens MD  Cardiology  Formerly [facility], later [facility]

## 2020-10-08 NOTE — PROGRESS NOTES
"[facility]  Adult Nutrition   Progress Note (Follow-Up)    SUMMARY     Recommendations/Interventions:    Recommendation/Intervention: 1. Continue current diet as tolerated, encourage intake. 2 Added Unjury BID to aid in meeting estimated protein needs to aid in incision healing and recovery. 3. Provided Post CABG diet education and discussed the importance of compliance.  Goals: 1. Patient to meet at least 75% of estimated needs via PO intake of meals and supplements. 2. Patient to show understanding of diet education and the importance of compliance.  Nutrition Goal Status: new    Dietitian Rounds Brief:  · Seen 2' f/u. Patient is s/p CABG. Appetite and intake good. Added Unjruy to provide additional protein. Educated patient on Post-CABG diet-patient and wife showed understanding. No complaints at this time except wanting to go home. Will continue to monitor intake, labs, and plan of care.    Reason for Assessment  Reason For Assessment: RD follow-up  Diagnosis: cardiac disease  Relevant Medical History: CAD, HTN, s/p CABG  Interdisciplinary Rounds: attended    Nutrition Risk Screen  Nutrition Risk Screen: no indicators present     MST Score: 0  Have you recently lost weight without trying?: No  Weight loss score: 0  Have you been eating poorly because of a decreased appetite?: No  Appetite score: 0     Nutrition/Diet History  Food Allergies: NKFA  Factors Affecting Nutritional Intake: None identified at this time    Anthropometrics  Temp: 97.8 °F (36.6 °C)  Height Method: Stated  Height: 5' 8" (172.7 cm)  Height (inches): 68 in  Weight Method: Bed Scale  Weight: 81.6 kg (179 lb 14.3 oz)  Weight (lb): 179.9 lb  Ideal Body Weight (IBW), Male: 154 lb  % Ideal Body Weight, Male (lb): 116.82 %  BMI (Calculated): 27.4  BMI Grade: 25 - 29.9 - overweight     Weight History:  Wt Readings from Last 10 Encounters:   10/06/20 81.6 kg (179 lb 14.3 oz)   09/30/20 82.3 kg (181 lb 7 oz)   09/29/20 82.6 kg (182 lb) "   09/28/20 81.6 kg (180 lb)   09/25/20 81.6 kg (180 lb)   09/22/20 83 kg (183 lb)   03/16/20 79.4 kg (175 lb)   11/29/19 73 kg (161 lb)     Lab/Procedures/Meds: Pertinent Labs Reviewed  Clinical Chemistry:  Recent Labs   Lab 10/06/20  0614 10/07/20  0343 10/08/20  0438    141 138   K 4.1 4.3 4.1    106 99   CO2 28 26 28   * 105 140*   BUN 18 15 18   CREATININE 0.9 0.9 0.9   CALCIUM 9.6 8.5* 8.8   PROT 7.3  --   --    ALBUMIN 4.2  --   --    BILITOT 0.8  --   --    ALKPHOS 61  --   --    AST 65*  --   --    ALT 96*  --   --    ANIONGAP 8 9 11   ESTGFRAFRICA >60.0 >60.0 >60.0   EGFRNONAA >60.0 >60.0 >60.0   MG 2.0 2.0  --     < > = values in this interval not displayed.     CBC:   Recent Labs   Lab 10/08/20  0438   WBC 22.26*   RBC 3.50*   HGB 11.1*   HCT 33.5*      MCV 96   MCH 31.7*   MCHC 33.1     Medications: Pertinent Medications reviewed  Scheduled Meds:   aspirin  81 mg Oral Daily    chlorhexidine  15 mL Mouth/Throat BID    docusate sodium  100 mg Oral BID    metoprolol succinate  50 mg Oral Daily    mupirocin   Nasal BID    polyethylene glycol  17 g Oral Daily    potassium chloride  10 mEq Oral Q12H     Continuous Infusions:  PRN Meds:.dextrose 50%, dextrose 50%, HYDROcodone-acetaminophen, HYDROcodone-acetaminophen, morphine, ondansetron    Antibiotics (From admission, onward)    Start     Stop Route Frequency Ordered    10/06/20 2100  mupirocin 2 % ointment      10/11 2059 Nasl 2 times daily 10/06/20 1312        Estimated/Assessed Needs  Weight Used For Calorie Calculations: 82 kg (180 lb 12.4 oz)  Energy Calorie Requirements (kcal): 2050 (25kcal/kg)  Energy Need Method: Kcal/kg  Protein Requirements: 99-123gm (1.2-1.5gm/kg)  Weight Used For Protein Calculations: 82 kg (180 lb 12.4 oz)     Estimated Fluid Requirement Method: RDA Method  RDA Method (mL): 2050       Nutrition Prescription Ordered    Current Diet Order: Cardiac Diet    Evaluation of Received Nutrient/Fluid  Intake    Energy Calories Required: not meeting needs  Protein Required: not meeting needs  Fluid Required: not meeting needs  Tolerance: tolerating  % Intake of Estimated Energy Needs: 75 - 100 %  % Meal Intake: 75 - 100 %    Intake/Output Summary (Last 24 hours) at 10/8/2020 1010  Last data filed at 10/8/2020 0448  Gross per 24 hour   Intake 960.33 ml   Output 3200 ml   Net -2239.67 ml      Nutrition Risk    Level of Risk/Frequency of Follow-up: moderate   Monitor and Evaluation    Food and Nutrient Intake: energy intake, food and beverage intake  Food and Nutrient Adminstration: diet order  Knowledge/Beliefs/Attitudes: beliefs and attitudes, food and nutrition knowledge/skill  Physical Activity and Function: nutrition-related ADLs and IADLs, factors affecting access to physical activity  Anthropometric Measurements: weight, weight change, body mass index  Biochemical Data, Medical Tests and Procedures: electrolyte and renal panel, lipid profile, gastrointestinal profile, glucose/endocrine profile, inflammatory profile  Nutrition-Focused Physical Findings: overall appearance     Nutrition Follow-Up    RD Follow-up?: Yes  Marizol Shepard RD 10/08/2020 10:10 AM

## 2020-10-09 LAB
AMYLASE SERPL-CCNC: 51 U/L (ref 20–110)
ANION GAP SERPL CALC-SCNC: 11 MMOL/L (ref 8–16)
BASOPHILS # BLD AUTO: 0.02 K/UL (ref 0–0.2)
BASOPHILS NFR BLD: 0.1 % (ref 0–1.9)
BUN SERPL-MCNC: 19 MG/DL (ref 6–20)
CALCIUM SERPL-MCNC: 8.7 MG/DL (ref 8.7–10.5)
CHLORIDE SERPL-SCNC: 96 MMOL/L (ref 95–110)
CO2 SERPL-SCNC: 26 MMOL/L (ref 23–29)
CREAT SERPL-MCNC: 0.8 MG/DL (ref 0.5–1.4)
DIFFERENTIAL METHOD: ABNORMAL
EOSINOPHIL # BLD AUTO: 0 K/UL (ref 0–0.5)
EOSINOPHIL NFR BLD: 0.1 % (ref 0–8)
ERYTHROCYTE [DISTWIDTH] IN BLOOD BY AUTOMATED COUNT: 12.6 % (ref 11.5–14.5)
EST. GFR  (AFRICAN AMERICAN): >60 ML/MIN/1.73 M^2
EST. GFR  (NON AFRICAN AMERICAN): >60 ML/MIN/1.73 M^2
GLUCOSE SERPL-MCNC: 148 MG/DL (ref 70–110)
GLUCOSE SERPL-MCNC: 153 MG/DL (ref 70–110)
HCT VFR BLD AUTO: 36.3 % (ref 40–54)
HGB BLD-MCNC: 12.3 G/DL (ref 14–18)
IMM GRANULOCYTES # BLD AUTO: 0.08 K/UL (ref 0–0.04)
IMM GRANULOCYTES NFR BLD AUTO: 0.5 % (ref 0–0.5)
LIPASE SERPL-CCNC: 27 U/L (ref 4–60)
LYMPHOCYTES # BLD AUTO: 1.7 K/UL (ref 1–4.8)
LYMPHOCYTES NFR BLD: 9.6 % (ref 18–48)
MCH RBC QN AUTO: 31.9 PG (ref 27–31)
MCHC RBC AUTO-ENTMCNC: 33.9 G/DL (ref 32–36)
MCV RBC AUTO: 94 FL (ref 82–98)
MONOCYTES # BLD AUTO: 1.9 K/UL (ref 0.3–1)
MONOCYTES NFR BLD: 10.7 % (ref 4–15)
NEUTROPHILS # BLD AUTO: 13.7 K/UL (ref 1.8–7.7)
NEUTROPHILS NFR BLD: 79 % (ref 38–73)
NRBC BLD-RTO: 0 /100 WBC
PLATELET # BLD AUTO: 191 K/UL (ref 150–350)
PMV BLD AUTO: 10.8 FL (ref 9.2–12.9)
POTASSIUM SERPL-SCNC: 4 MMOL/L (ref 3.5–5.1)
RBC # BLD AUTO: 3.85 M/UL (ref 4.6–6.2)
SODIUM SERPL-SCNC: 133 MMOL/L (ref 136–145)
WBC # BLD AUTO: 17.3 K/UL (ref 3.9–12.7)

## 2020-10-09 PROCEDURE — 99232 PR SUBSEQUENT HOSPITAL CARE,LEVL II: ICD-10-PCS | Mod: ,,, | Performed by: INTERNAL MEDICINE

## 2020-10-09 PROCEDURE — 93010 ELECTROCARDIOGRAM REPORT: CPT | Mod: ,,, | Performed by: INTERNAL MEDICINE

## 2020-10-09 PROCEDURE — 80048 BASIC METABOLIC PNL TOTAL CA: CPT

## 2020-10-09 PROCEDURE — 93797 PHYS/QHP OP CAR RHAB WO ECG: CPT

## 2020-10-09 PROCEDURE — 83690 ASSAY OF LIPASE: CPT

## 2020-10-09 PROCEDURE — 25000003 PHARM REV CODE 250

## 2020-10-09 PROCEDURE — 85025 COMPLETE CBC W/AUTO DIFF WBC: CPT

## 2020-10-09 PROCEDURE — 93005 ELECTROCARDIOGRAM TRACING: CPT | Performed by: INTERNAL MEDICINE

## 2020-10-09 PROCEDURE — 21000000 HC CCU ICU ROOM CHARGE

## 2020-10-09 PROCEDURE — 63600175 PHARM REV CODE 636 W HCPCS: Performed by: THORACIC SURGERY (CARDIOTHORACIC VASCULAR SURGERY)

## 2020-10-09 PROCEDURE — 94761 N-INVAS EAR/PLS OXIMETRY MLT: CPT

## 2020-10-09 PROCEDURE — 25000003 PHARM REV CODE 250: Performed by: INTERNAL MEDICINE

## 2020-10-09 PROCEDURE — 36415 COLL VENOUS BLD VENIPUNCTURE: CPT

## 2020-10-09 PROCEDURE — 99232 SBSQ HOSP IP/OBS MODERATE 35: CPT | Mod: ,,, | Performed by: INTERNAL MEDICINE

## 2020-10-09 PROCEDURE — 93010 EKG 12-LEAD: ICD-10-PCS | Mod: ,,, | Performed by: INTERNAL MEDICINE

## 2020-10-09 PROCEDURE — 82150 ASSAY OF AMYLASE: CPT

## 2020-10-09 PROCEDURE — 25000003 PHARM REV CODE 250: Performed by: THORACIC SURGERY (CARDIOTHORACIC VASCULAR SURGERY)

## 2020-10-09 RX ORDER — FAMOTIDINE 20 MG/1
20 TABLET, FILM COATED ORAL 2 TIMES DAILY
Status: DISCONTINUED | OUTPATIENT
Start: 2020-10-09 | End: 2020-10-12 | Stop reason: HOSPADM

## 2020-10-09 RX ORDER — AMLODIPINE BESYLATE 5 MG/1
5 TABLET ORAL DAILY
Status: DISCONTINUED | OUTPATIENT
Start: 2020-10-09 | End: 2020-10-12 | Stop reason: HOSPADM

## 2020-10-09 RX ORDER — SODIUM CHLORIDE 9 MG/ML
INJECTION, SOLUTION INTRAVENOUS CONTINUOUS
Status: DISCONTINUED | OUTPATIENT
Start: 2020-10-09 | End: 2020-10-09

## 2020-10-09 RX ORDER — POLYETHYLENE GLYCOL 3350 17 G/17G
68 POWDER, FOR SOLUTION ORAL ONCE
Status: DISCONTINUED | OUTPATIENT
Start: 2020-10-09 | End: 2020-10-12 | Stop reason: HOSPADM

## 2020-10-09 RX ADMIN — METOPROLOL SUCCINATE 50 MG: 50 TABLET, FILM COATED, EXTENDED RELEASE ORAL at 08:10

## 2020-10-09 RX ADMIN — POTASSIUM CHLORIDE 10 MEQ: 750 CAPSULE, EXTENDED RELEASE ORAL at 08:10

## 2020-10-09 RX ADMIN — AMLODIPINE BESYLATE 5 MG: 5 TABLET ORAL at 05:10

## 2020-10-09 RX ADMIN — DOCUSATE SODIUM 100 MG: 100 CAPSULE, LIQUID FILLED ORAL at 08:10

## 2020-10-09 RX ADMIN — MORPHINE SULFATE 2 MG: 2 INJECTION, SOLUTION INTRAMUSCULAR; INTRAVENOUS at 05:10

## 2020-10-09 RX ADMIN — POLYETHYLENE GLYCOL 3350 17 G: 17 POWDER, FOR SOLUTION ORAL at 09:10

## 2020-10-09 RX ADMIN — ONDANSETRON 8 MG: 4 TABLET, ORALLY DISINTEGRATING ORAL at 05:10

## 2020-10-09 RX ADMIN — CHLORHEXIDINE GLUCONATE 15 ML: 1.2 RINSE ORAL at 08:10

## 2020-10-09 RX ADMIN — MORPHINE SULFATE 2 MG: 2 INJECTION, SOLUTION INTRAMUSCULAR; INTRAVENOUS at 09:10

## 2020-10-09 RX ADMIN — MUPIROCIN: 20 OINTMENT TOPICAL at 09:10

## 2020-10-09 RX ADMIN — FAMOTIDINE 20 MG: 20 TABLET, FILM COATED ORAL at 08:10

## 2020-10-09 RX ADMIN — SODIUM CHLORIDE: 0.9 INJECTION, SOLUTION INTRAVENOUS at 05:10

## 2020-10-09 RX ADMIN — HYDROCODONE BITARTRATE AND ACETAMINOPHEN 1 TABLET: 7.5; 325 TABLET ORAL at 08:10

## 2020-10-09 RX ADMIN — ATORVASTATIN CALCIUM 80 MG: 40 TABLET, FILM COATED ORAL at 08:10

## 2020-10-09 RX ADMIN — ASPIRIN 81 MG: 81 TABLET, DELAYED RELEASE ORAL at 08:10

## 2020-10-09 NOTE — PROGRESS NOTES
Cardiac Rehab     Louie Wahl   [id number]   10/9/2020         Cardiac Rehab Phase Taught: Phase 1    Teaching Method: Verbal    Handouts: None    Educational Videos: None    Understanding:  Learning indicated by feedback, Needs further review and Verbalize understanding    Comments: pt in bed, sleeping. Spoke with wife. cabg discharge packet at bedside for review. Spoke with bedside RN who will play discharge cabg video. Will follow oupt for education    Total Time Spent:10mins            Raine Camacho RN

## 2020-10-09 NOTE — CARE UPDATE
10/08/20 2005   Patient Assessment/Suction   Level of Consciousness (AVPU) alert   Respiratory Effort Unlabored   Expansion/Accessory Muscles/Retractions no use of accessory muscles   All Lung Fields Breath Sounds clear;diminished   Rhythm/Pattern, Respiratory no shortness of breath reported   Cough Frequency no cough   PRE-TX-O2   O2 Device (Oxygen Therapy) nasal cannula   $ Is the patient on Low Flow Oxygen? Yes   Flow (L/min) 3   SpO2 96 %   Pulse Oximetry Type Continuous   $ Pulse Oximetry - Multiple Charge Pulse Oximetry - Multiple   Pulse 89   Resp 20   Positioning   Head of Bed (HOB) HOB elevated   Respiratory Evaluation   $ Care Plan Tech Time 15 min

## 2020-10-09 NOTE — PROGRESS NOTES
Cardiac Rehab     Louie ZEFERINO Wahl   [id number]   10/9/2020         Cardiac Rehab Phase Taught: Phase 1    Teaching Method: Verbal    Handouts: None    Educational Videos: None    Understanding:  Learning indicated by feedback, Needs further review and Verbalize understanding    Comments: pt in bed, states he feels terrible, complaints of abdominal pain/cramps/gas. Pt anxious about postop recovery. Review of postop cabg education including sternal precautions, pursed lip breathing, ambulation, pain control, diet. Pt voiced understanding. Spoke with bedside RN, will follow for further education    Total Time Spent:15mins            Raine Camacho RN

## 2020-10-09 NOTE — PROGRESS NOTES
[facility]  Cardiology  Progress Note    Patient Name: Louie aWhl  MRN: [id number]  Admission Date: 10/6/2020  Hospital Length of Stay: 2 days  Code Status: Full Code   Attending Physician: Long Garvin MD   Primary Care Physician: Rachel Nuñez NP  Expected Discharge Date:   Principal Problem:Coronary artery disease of native artery of native heart with stable angina pectoris    Subjective:       Interval History: Reports constipation. He reports the pain. Denies any shortness of breath. Telemetry shows sinus rhythm.     ROS  Objective:     Vital Signs (Most Recent):  Temp: 99.5 °F (37.5 °C) (10/08/20 1905)  Pulse: 95 (10/08/20 1905)  Resp: 20 (10/08/20 2105)  BP: (!) 165/83 (10/08/20 1905)  SpO2: 96 % (10/08/20 1905) Vital Signs (24h Range):  Temp:  [97.8 °F (36.6 °C)-99.5 °F (37.5 °C)] 99.5 °F (37.5 °C)  Pulse:  [] 95  Resp:  [12-21] 20  SpO2:  [90 %-97 %] 96 %  BP: (126-167)/(66-83) 165/83     Weight: 81.6 kg (179 lb 14.3 oz)  Body mass index is 27.35 kg/m².    SpO2: 96 %  O2 Device (Oxygen Therapy): nasal cannula      Intake/Output Summary (Last 24 hours) at 10/8/2020 2213  Last data filed at 10/8/2020 1400  Gross per 24 hour   Intake 1080 ml   Output 950 ml   Net 130 ml       Lines/Drains/Airways     Central Venous Catheter Line            Percutaneous Central Line Insertion/Assessment - single lumen  10/06/20 0746 2 days          Line                 Pacer Wires 10/06/20 0900 2 days                Scheduled Meds:   aspirin  81 mg Oral Daily    chlorhexidine  15 mL Mouth/Throat BID    docusate sodium  100 mg Oral BID    lisinopriL  2.5 mg Oral Daily    metoprolol succinate  50 mg Oral BID    mupirocin   Nasal BID    polyethylene glycol  17 g Oral Daily    potassium chloride  10 mEq Oral Q12H     Continuous Infusions:  PRN Meds:.dextrose 50%, dextrose 50%, HYDROcodone-acetaminophen, HYDROcodone-acetaminophen, morphine, ondansetron, promethazine (PHENERGAN) IVPB      Physical Exam  HEENT: Normocephalic, atraumatic, PERRL, Conjunctiva pink, no scleral icterus.   CVS: S1S2+, RRR, no murmurs, rubs or gallops, JVP: Normal. RIJ CVL in place.   LUNGS: Clear  ABDOMEN: Soft, NT, BS+  EXTREMITIES: No cyanosis, clubbing or edema  NEURO: AAO X 3.   STERNOTOMY: C/D/I    Significant Labs:   BMP:   Recent Labs   Lab 10/07/20  0343 10/08/20  0438    140*    138   K 4.3 4.1    99   CO2 26 28   BUN 15 18   CREATININE 0.9 0.9   CALCIUM 8.5* 8.8   MG 2.0  --    , CMP   Recent Labs   Lab 10/07/20  0343 10/08/20  0438    138   K 4.3 4.1    99   CO2 26 28    140*   BUN 15 18   CREATININE 0.9 0.9   CALCIUM 8.5* 8.8   ANIONGAP 9 11   ESTGFRAFRICA >60.0 >60.0   EGFRNONAA >60.0 >60.0   , CBC   Recent Labs   Lab 10/07/20  0343 10/08/20  0438   WBC 13.61* 22.26*   HGB 10.8* 11.1*   HCT 32.4*  32.4* 33.5*    170   , INR   No results for input(s): INR, PROTIME in the last 48 hours., Lipid Panel No results for input(s): CHOL, HDL, LDLCALC, TRIG, CHOLHDL in the last 48 hours. and Troponin No results for input(s): TROPONINI in the last 48 hours.    Significant Imaging: Reviewed  Assessment and Plan:     IMPRESSION:  CAD. 60% distal left main, sever proximal to mid LCx and pRCA lesions.   S/p CABG X 5 on 10/6/2020 by Dr Garvin(SVG to LAD, sequential SVG to rPDA/inferior ventricular branch, natural Y sequential SVG to Ramus and distal LCx). Hemodynamically stable.   Disctal aortic arch thrombus. Resolved on DIONISIO done in 3/2020.   Hypertension.   Dyslipidemia.   Anemia. Post op. H&H acceptable.       PLAN:  1. Resume Lipitor  2. DC Lisinopril as he is allergic to it.   3. Continue current regimen otherwise.       Angela Dcikens MD  Cardiology  [facility]

## 2020-10-09 NOTE — CONSULTS
Chief Complaint:  Abdominal pain    HPI:  60 M post op CABG day 3 with constipation and mild diffuse bloating gas cramping and pain.  Pain better with small defecation this AM.  Passing gas.  KUB normal.  Abdomen soft with expected post op changes and bruising.  Afebrile.      Review of Systems: Complete ROS performed and negative unless stated above in HPI          Past Medical History:   Diagnosis Date    Anticoagulant long-term use     Atrial flutter 2019    Coronary artery disease     Hyperlipidemia 2009    Hypertension 2009       Past Surgical History:   Procedure Laterality Date    CORONARY ARTERY BYPASS GRAFT (CABG) N/A 10/6/2020    Procedure: CORONARY ARTERY BYPASS GRAFT (CABG);  Surgeon: Long Garvin MD;  Location: [facility] OR;  Service: Cardiovascular;  Laterality: N/A;    ENDOSCOPIC HARVEST OF VEIN Left 10/6/2020    Procedure: SURGICAL PROCUREMENT, VEIN, ENDOSCOPIC;  Surgeon: Long Garvin MD;  Location: [facility] OR;  Service: Cardiovascular;  Laterality: Left;    INCISION AND DRAINAGE Right 1964    knee    LEFT HEART CATHETERIZATION Left 9/28/2020    Procedure: Left heart cath;  Surgeon: Angela Dickens MD;  Location: [facility] CATH/EP LAB;  Service: Cardiology;  Laterality: Left;    MANDIBLE FRACTURE SURGERY Left 1995    wires in jaw    TRANSESOPHAGEAL ECHOCARDIOGRAPHY      x3       Family History   Problem Relation Age of Onset    Heart failure Mother     Heart disease Mother     Heart disease Father     Heart failure Father        Social History     Socioeconomic History    Marital status: Single     Spouse name: Not on file    Number of children: Not on file    Years of education: Not on file    Highest education level: Not on file   Occupational History    Not on file   Social Needs    Financial resource strain: Not on file    Food insecurity     Worry: Not on file     Inability: Not on file    Transportation needs     Medical: Not on file     Non-medical: Not on file    Tobacco Use    Smoking status: Former Smoker     Packs/day: 0.50     Years: 45.00     Pack years: 22.50     Quit date: 2020     Years since quittin.0    Smokeless tobacco: Never Used    Tobacco comment: 1 pack per month   Substance and Sexual Activity    Alcohol use: Not Currently     Comment: Social    Drug use: Not Currently     Types: Marijuana    Sexual activity: Not Currently   Lifestyle    Physical activity     Days per week: Not on file     Minutes per session: Not on file    Stress: Not on file   Relationships    Social connections     Talks on phone: Not on file     Gets together: Not on file     Attends [employer] service: Not on file     Active member of club or organization: Not on file     Attends meetings of clubs or organizations: Not on file     Relationship status: Not on file   Other Topics Concern    Not on file   Social History Narrative    Not on file       Review of patient's allergies indicates:   Allergen Reactions    Ace inhibitors Hives and Swelling       No current facility-administered medications on file prior to encounter.      Current Outpatient Medications on File Prior to Encounter   Medication Sig Dispense Refill    apixaban (ELIQUIS) 5 mg Tab Take 5 mg by mouth 2 (two) times daily.      aspirin 81 MG Chew Take 81 mg by mouth once daily.      clopidogreL (PLAVIX) 75 mg tablet Take 75 mg by mouth once daily.      multivit-minerals/folic acid (SPECTRAVITE ADULT ORAL) Take by mouth.      omega-3 fatty acids/fish oil (FISH OIL-OMEGA-3 FATTY ACIDS) 300-1,000 mg capsule Take 1 capsule by mouth 2 (two) times a day.      atorvastatin (LIPITOR) 80 MG tablet Take 80 mg by mouth every evening.   1    isosorbide mononitrate (IMDUR) 30 MG 24 hr tablet TAKE 1 TABLET BY MOUTH EVERY DAY (Patient taking differently: Take 30 mg by mouth once daily. ) 30 tablet 3    metoprolol succinate (TOPROL-XL) 50 MG 24 hr tablet Take 50 mg by mouth. 50mg every morning, 25mg at night   "3    pantoprazole (PROTONIX) 40 MG tablet Take 40 mg by mouth once daily.         Objective:  BP (!) 155/74   Pulse 90   Temp 97.8 °F (36.6 °C) (Oral)   Resp 17   Ht 5' 8" (1.727 m)   Wt 81.6 kg (179 lb 14.3 oz)   SpO2 (!) 94%   BMI 27.35 kg/m²   General: wd, wn, nad  HE: ncat, perrl, eomi  ENT: op pink and moist without lesions or exudates  CV: +s1s2, no mrg, rrr, midline surgical bandage  Resp: ctapb, no wrr  GI: bs active, abd soft, nt, nd, ecchymosis to rlq.    Skin: no lesions, no rash  Neuro: cn 2-12 in tact, no focal deficits, no asterixis  Psych: regular rate speech, normal affect    Labs:  Lab Results   Component Value Date    WBC 17.30 (H) 10/09/2020    HGB 12.3 (L) 10/09/2020    HCT 36.3 (L) 10/09/2020    MCV 94 10/09/2020     10/09/2020       BMP  Lab Results   Component Value Date     (L) 10/09/2020    K 4.0 10/09/2020    CL 96 10/09/2020    CO2 26 10/09/2020    BUN 19 10/09/2020    CREATININE 0.8 10/09/2020    CALCIUM 8.7 10/09/2020    ANIONGAP 11 10/09/2020    ESTGFRAFRICA >60.0 10/09/2020    EGFRNONAA >60.0 10/09/2020     Lab Results   Component Value Date    ALT 96 (H) 10/06/2020    AST 65 (H) 10/06/2020    ALKPHOS 61 10/06/2020    BILITOT 0.8 10/06/2020     Lab Results   Component Value Date    LIPASE 27 10/09/2020         Diagnostic Studies:       Abdominal U/S:    IMPRESSION:     Increased echogenicity of the liver is consistent with hepatic  steatosis. No other abnormalities identified.     Electronically Signed by Nathaniel Paredes on 10/9/2020 6:44 AM    Assessment:  60 M post op day 3 cabg with constipation and bloating. PE w/o concern for ileus/sbo at this point.  Will give miralax 4 caps with 30 oz gatorade as pt symptomatically better post small defecation.  If no results recommend enema.  If no improvement or symptoms progress recommend ct imaging     Plan:  As above  Will sign off    "

## 2020-10-09 NOTE — PROGRESS NOTES
POD #3         The patient is doing much better this afternoon after he had a large bowel movement.  He remains in sinus rhythm, but hypertensive.        Lungs- clear  Abdomen - soft  Extremities - no edema  Wounds - dressings clean and dry    Labs - reviewed    CXR - satisfactory postoperative appearance    Plan:  Resume diet            Saline lock IV fluids             Progressive activity             Home on Monday October 12th, 2000

## 2020-10-09 NOTE — PROGRESS NOTES
Cardiac Rehab     Louie Wahl   [id number]   10/9/2020         Cardiac Rehab Phase Taught: Phase 1    Teaching Method: Verbal, Audio/Visual    Handouts: After Heart Surgery Booklet, Cardiac Diet Pack, Discharge Instructions First Two Weeks Post-Op, Home Activity Sheet, Home Walking Program Sheet and Post-op CABG Booklet    Educational Videos: Preparing for Discharge    Understanding:  Learning indicated by feedback, Needs further review and Verbalize understanding    Comments: pt ambulated x2 with walker, tolerated well. Room air pulse ox 93 with ambulation. Pt c/o abdominal pain/gas. Assisted to bedside chair. Discharge cabg packet given for review. Spoke with bedside RN. Will follow    Total Time Spent:30mins            Raine Camacho RN

## 2020-10-09 NOTE — PROGRESS NOTES
Formerly [facility], [facility]  Cardiology  Progress Note    Patient Name: Louie Wahl  MRN: [id number]  Admission Date: 10/6/2020  Hospital Length of Stay: 3 days  Code Status: Full Code   Attending Physician: Long Garvin MD   Primary Care Physician: Rachel Nuñez NP  Expected Discharge Date:   Principal Problem:Coronary artery disease of native artery of native heart with stable angina pectoris    Subjective:       Interval History: Reports lower abdominal pain and constipation. Also reports nausea. Denies any shortness of breath or significant pain at the surgical site. He is passing flatus. Telemetry shows sinus rhythm.     ROS   Denies any focal weakness.   Denies any bleeding issues.   Objective:     Vital Signs (Most Recent):  Temp: 99.8 °F (37.7 °C) (10/09/20 0714)  Pulse: 98 (10/09/20 0714)  Resp: 19 (10/09/20 0714)  BP: (!) 184/84 (10/09/20 0714)  SpO2: (!) 92 % (10/09/20 0714) Vital Signs (24h Range):  Temp:  [98.3 °F (36.8 °C)-99.8 °F (37.7 °C)] 99.8 °F (37.7 °C)  Pulse:  [85-98] 98  Resp:  [15-20] 19  SpO2:  [92 %-99 %] 92 %  BP: (129-188)/(66-87) 184/84     Weight: 81.6 kg (179 lb 14.3 oz)  Body mass index is 27.35 kg/m².    SpO2: (!) 92 %  O2 Device (Oxygen Therapy): room air      Intake/Output Summary (Last 24 hours) at 10/9/2020 0744  Last data filed at 10/8/2020 1400  Gross per 24 hour   Intake 720 ml   Output --   Net 720 ml       Lines/Drains/Airways     Central Venous Catheter Line            Percutaneous Central Line Insertion/Assessment - single lumen  10/06/20 0746 2 days          Line                 Pacer Wires 10/06/20 0900 2 days                Scheduled Meds:   aspirin  81 mg Oral Daily    atorvastatin  80 mg Oral QHS    chlorhexidine  15 mL Mouth/Throat BID    docusate sodium  100 mg Oral BID    metoprolol succinate  50 mg Oral BID    mupirocin   Nasal BID    polyethylene glycol  17 g Oral Daily    potassium chloride  10 mEq Oral Q12H     Continuous Infusions:   sodium  chloride 0.9% 100 mL/hr at 10/09/20 0500     PRN Meds:.dextrose 50%, dextrose 50%, HYDROcodone-acetaminophen, HYDROcodone-acetaminophen, morphine, ondansetron, promethazine (PHENERGAN) IVPB     Physical Exam  HEENT: Normocephalic, atraumatic, PERRL, Conjunctiva pink, no scleral icterus.   CVS: S1S2+, RRR, no murmurs, rubs or gallops, JVP: Normal. RIJ CVL in place.   LUNGS: Clear  ABDOMEN: Soft, NT, BS+  EXTREMITIES: No cyanosis, clubbing or edema  NEURO: AAO X 3.   STERNOTOMY: C/D/I    Significant Labs:   BMP:   Recent Labs   Lab 10/08/20  0438 10/09/20  0417   * 153*    133*   K 4.1 4.0   CL 99 96   CO2 28 26   BUN 18 19   CREATININE 0.9 0.8   CALCIUM 8.8 8.7   , CMP   Recent Labs   Lab 10/08/20  0438 10/09/20  0417    133*   K 4.1 4.0   CL 99 96   CO2 28 26   * 153*   BUN 18 19   CREATININE 0.9 0.8   CALCIUM 8.8 8.7   ANIONGAP 11 11   ESTGFRAFRICA >60.0 >60.0   EGFRNONAA >60.0 >60.0   , CBC   Recent Labs   Lab 10/08/20  0438 10/09/20  0417   WBC 22.26* 17.30*   HGB 11.1* 12.3*   HCT 33.5* 36.3*    191   , INR   No results for input(s): INR, PROTIME in the last 48 hours., Lipid Panel No results for input(s): CHOL, HDL, LDLCALC, TRIG, CHOLHDL in the last 48 hours. and Troponin No results for input(s): TROPONINI in the last 48 hours.    Significant Imaging: Reviewed  Assessment and Plan:     IMPRESSION:  CAD. 60% distal left main, sever proximal to mid LCx and pRCA lesions.   S/p CABG X 5 on 10/6/2020 by Dr Garvin(SVG to LAD, sequential SVG to rPDA/inferior ventricular branch, natural Y sequential SVG to Ramus and distal LCx). Hemodynamically stable.   Disctal aortic arch thrombus. Resolved on DIONISIO done in 3/2020.   Hypertension.   Dyslipidemia.   Anemia. Post op. H&H acceptable.   Abdominal pain/Constipation.     PLAN:  1. Continue current management for now.   2. GI has already been consulted.   3. Dr Segundo Bashir covering over the weekend.   4. If BP remains high despite abdominal  pain controlled, consider adding Norvasc.       Anglea Dickens MD  Cardiology  [facility]

## 2020-10-09 NOTE — NURSING
Contacted Dr Garvin due to patient vomiting with no relief from sublingual Zofran. Phenergan 25mg IV ordered q6hr PRN. Patient responded well, no further complaints of nausea at this time.

## 2020-10-09 NOTE — NURSING
Patient began complaining of nausea and vomiting. Upon assessment, patient appeared to be pale and diaphoretic. Patient also complained of abdominal pain. EKG performed and full set of vitals taken. Dr Garvin notified, orders received. Will continue to monitor.

## 2020-10-10 LAB
BLD PROD TYP BPU: NORMAL
BLD PROD TYP BPU: NORMAL
BLOOD UNIT EXPIRATION DATE: NORMAL
BLOOD UNIT EXPIRATION DATE: NORMAL
BLOOD UNIT TYPE CODE: 6200
BLOOD UNIT TYPE CODE: 6200
BLOOD UNIT TYPE: NORMAL
BLOOD UNIT TYPE: NORMAL
CODING SYSTEM: NORMAL
CODING SYSTEM: NORMAL
DISPENSE STATUS: NORMAL
DISPENSE STATUS: NORMAL
NUM UNITS TRANS PACKED RBC: NORMAL
NUM UNITS TRANS PACKED RBC: NORMAL

## 2020-10-10 PROCEDURE — 25000003 PHARM REV CODE 250: Performed by: INTERNAL MEDICINE

## 2020-10-10 PROCEDURE — 25000003 PHARM REV CODE 250

## 2020-10-10 PROCEDURE — 94761 N-INVAS EAR/PLS OXIMETRY MLT: CPT

## 2020-10-10 PROCEDURE — 99232 SBSQ HOSP IP/OBS MODERATE 35: CPT | Mod: ,,, | Performed by: INTERNAL MEDICINE

## 2020-10-10 PROCEDURE — 21000000 HC CCU ICU ROOM CHARGE

## 2020-10-10 PROCEDURE — 99232 PR SUBSEQUENT HOSPITAL CARE,LEVL II: ICD-10-PCS | Mod: ,,, | Performed by: INTERNAL MEDICINE

## 2020-10-10 PROCEDURE — 25000003 PHARM REV CODE 250: Performed by: THORACIC SURGERY (CARDIOTHORACIC VASCULAR SURGERY)

## 2020-10-10 RX ADMIN — HYDROCODONE BITARTRATE AND ACETAMINOPHEN 1 TABLET: 7.5; 325 TABLET ORAL at 02:10

## 2020-10-10 RX ADMIN — POLYETHYLENE GLYCOL 3350 17 G: 17 POWDER, FOR SOLUTION ORAL at 09:10

## 2020-10-10 RX ADMIN — CHLORHEXIDINE GLUCONATE 15 ML: 1.2 RINSE ORAL at 08:10

## 2020-10-10 RX ADMIN — DOCUSATE SODIUM 100 MG: 100 CAPSULE, LIQUID FILLED ORAL at 09:10

## 2020-10-10 RX ADMIN — FAMOTIDINE 20 MG: 20 TABLET, FILM COATED ORAL at 08:10

## 2020-10-10 RX ADMIN — MUPIROCIN: 20 OINTMENT TOPICAL at 08:10

## 2020-10-10 RX ADMIN — POTASSIUM CHLORIDE 10 MEQ: 750 CAPSULE, EXTENDED RELEASE ORAL at 09:10

## 2020-10-10 RX ADMIN — ONDANSETRON 8 MG: 4 TABLET, ORALLY DISINTEGRATING ORAL at 05:10

## 2020-10-10 RX ADMIN — HYDROCODONE BITARTRATE AND ACETAMINOPHEN 1 TABLET: 7.5; 325 TABLET ORAL at 07:10

## 2020-10-10 RX ADMIN — ATORVASTATIN CALCIUM 80 MG: 40 TABLET, FILM COATED ORAL at 08:10

## 2020-10-10 RX ADMIN — METOPROLOL SUCCINATE 50 MG: 50 TABLET, FILM COATED, EXTENDED RELEASE ORAL at 09:10

## 2020-10-10 RX ADMIN — ASPIRIN 81 MG: 81 TABLET, DELAYED RELEASE ORAL at 09:10

## 2020-10-10 RX ADMIN — CHLORHEXIDINE GLUCONATE 15 ML: 1.2 RINSE ORAL at 09:10

## 2020-10-10 RX ADMIN — AMLODIPINE BESYLATE 5 MG: 5 TABLET ORAL at 09:10

## 2020-10-10 RX ADMIN — POTASSIUM CHLORIDE 10 MEQ: 750 CAPSULE, EXTENDED RELEASE ORAL at 08:10

## 2020-10-10 RX ADMIN — FAMOTIDINE 20 MG: 20 TABLET, FILM COATED ORAL at 09:10

## 2020-10-10 RX ADMIN — HYDROCODONE BITARTRATE AND ACETAMINOPHEN 1 TABLET: 7.5; 325 TABLET ORAL at 04:10

## 2020-10-10 RX ADMIN — METOPROLOL SUCCINATE 50 MG: 50 TABLET, FILM COATED, EXTENDED RELEASE ORAL at 08:10

## 2020-10-10 NOTE — PROGRESS NOTES
Pod 3 cabg  Pt in bed without co, feeling much better than yesterday, slept well, vidhya half of breakfast. Ready to walk.  VSS nsr 145/70, 75, afebrile, 98% sat on RA  Lungs clear B -2200 on IS  Heart rrr  abd soft, good Bm   ext warm without edema, wounds dry   pacer pulled, cordis pulled  Pt may shower  Home soon

## 2020-10-10 NOTE — PLAN OF CARE
Plan of care, medication regime, pain management, IS use, and wound care discussed with pt and significant other. Pt and significant other verbalized understanding.

## 2020-10-10 NOTE — PROGRESS NOTES
[facility]  Department of Cardiology  Progress Note    PATIENT NAME: Louie Wahl  MRN: [id number]  TODAY'S DATE: 10/10/2020  ADMIT DATE: 10/6/2020    SUBJECTIVE     PRINCIPLE PROBLEM: Coronary artery disease of native artery of native heart with stable angina pectoris    INTERVAL HISTORY:    10/10/2020  Patient is awake alert seated in chair not any acute distress.  Patient stated that he passed gas today and feels related about it he feels much better today than he did yesterday.  His breathing also improved.  The and also seems to have rested well last night.    Review of patient's allergies indicates:   Allergen Reactions    Ace inhibitors Hives and Swelling       REVIEW OF SYSTEMS  CARDIOVASCULAR: No recent chest pain, palpitations, arm, neck, or jaw pain  RESPIRATORY: No recent fever, cough chills, SOB or congestion  : No blood in the urine  GI: No Nausea, vomiting, , diarrhea, blood, or reflux.  Constipated seems to move gas.  MUSCULOSKELETAL: No myalgias  NEURO: No lightheadedness or dizziness  EYES: No Double vision, blurry, vision or headache     OBJECTIVE     VITAL SIGNS (Most Recent)  Temp: 96.6 °F (35.9 °C) (10/10/20 1627)  Pulse: 96 (10/10/20 1627)  Resp: 17 (10/10/20 1645)  BP: 119/66 (10/10/20 1627)  SpO2: (!) 91 % (10/10/20 1627)    VENTILATION STATUS  Resp: 17 (10/10/20 1645)  SpO2: (!) 91 % (10/10/20 1627)       I & O (Last 24H):    Intake/Output Summary (Last 24 hours) at 10/10/2020 1646  Last data filed at 10/10/2020 1337  Gross per 24 hour   Intake 180 ml   Output --   Net 180 ml       WEIGHTS  Wt Readings from Last 1 Encounters:   10/10/20 0400 79.6 kg (175 lb 7.8 oz)   10/06/20 0615 81.6 kg (179 lb 14.3 oz)       PHYSICAL EXAM  CONSTITUTIONAL: Well built, well nourished in no apparent distress  NECK: no carotid bruit, no JVD  LUNGS: CTA  CHEST WALL: no tenderness  HEART: regular rate and rhythm, S1, S2 normal, no murmur, click, rub or gallop   ABDOMEN: soft, non-tender;  bowel sounds normal; no masses,  no organomegaly  EXTREMITIES: Extremities normal, no edema  NEURO: AAO X 3    SCHEDULED MEDS:   amLODIPine  5 mg Oral Daily    aspirin  81 mg Oral Daily    atorvastatin  80 mg Oral QHS    chlorhexidine  15 mL Mouth/Throat BID    docusate sodium  100 mg Oral BID    famotidine  20 mg Oral BID    metoprolol succinate  50 mg Oral BID    mupirocin   Nasal BID    polyethylene glycol  17 g Oral Daily    polyethylene glycol  68 g Oral Once    potassium chloride  10 mEq Oral Q12H       CONTINUOUS INFUSIONS:    PRN MEDS:dextrose 50%, dextrose 50%, HYDROcodone-acetaminophen, HYDROcodone-acetaminophen, morphine, ondansetron, promethazine (PHENERGAN) IVPB    LABS AND DIAGNOSTICS     CBC LAST 3 DAYS  Recent Labs   Lab 10/07/20  0343 10/08/20  0438 10/09/20  0417   WBC 13.61* 22.26* 17.30*   RBC 3.42* 3.50* 3.85*   HGB 10.8* 11.1* 12.3*   HCT 32.4*  32.4* 33.5* 36.3*   MCV 95 96 94   MCH 31.6* 31.7* 31.9*   MCHC 33.3 33.1 33.9   RDW 12.8 13.1 12.6    170 191   MPV 11.2 11.2 10.8   GRAN 87.6*  11.9* 82.2*  18.3* 79.0*  13.7*   LYMPH 4.6*  0.6* 9.2*  2.1 9.6*  1.7   MONO 7.3  1.0 7.9  1.8* 10.7  1.9*   BASO 0.01 0.02 0.02   NRBC 0 0 0       COAGULATION LAST 3 DAYS  Recent Labs   Lab 10/06/20  0614 10/06/20  1334   LABPT 13.6 17.4*   INR 1.1 1.5   APTT 28.5 30.3       CHEMISTRY LAST 3 DAYS  Recent Labs   Lab 10/06/20  0614  10/06/20  1334  10/06/20  1954  10/06/20  2136 10/06/20  2242 10/06/20  2353 10/07/20  0343 10/08/20  0438 10/09/20  0417     --  137  --  137  --   --   --   --  141 138 133*   K 4.1  --  3.5  --  4.4  --   --   --   --  4.3 4.1 4.0     --  105  --  106  --   --   --   --  106 99 96   CO2 28  --  23  --  17*  --   --   --   --  26 28 26   ANIONGAP 8  --  9  --  14  --   --   --   --  9 11 11   BUN 18  --  17  --  16  --   --   --   --  15 18 19   CREATININE 0.9  --  1.0  --  1.1  --   --   --   --  0.9 0.9 0.8   *  --  152*  --  240*   --   --   --   --  105 140* 153*   CALCIUM 9.6  --  8.5*  --  8.3*  --   --   --   --  8.5* 8.8 8.7   PH  --    < >  --    < >  --    < > 7.204* 7.317* 7.342*  --   --   --    MG 2.0  --  1.8  --  2.1  --   --   --   --  2.0  --   --    ALBUMIN 4.2  --   --   --   --   --   --   --   --   --   --   --    PROT 7.3  --   --   --   --   --   --   --   --   --   --   --    ALKPHOS 61  --   --   --   --   --   --   --   --   --   --   --    ALT 96*  --   --   --   --   --   --   --   --   --   --   --    AST 65*  --   --   --   --   --   --   --   --   --   --   --    BILITOT 0.8  --   --   --   --   --   --   --   --   --   --   --     < > = values in this interval not displayed.       CARDIAC PROFILE LAST 3 DAYS  No results for input(s): BNP, CPK, CPKMB, LDH, TROPONINI in the last 168 hours.    ENDOCRINE LAST 3 DAYS  No results for input(s): TSH, PROCAL in the last 168 hours.    LAST ARTERIAL BLOOD GAS  ABG  Recent Labs   Lab 10/06/20  2353   PH 7.342*   PO2 69*   PCO2 38.3   HCO3 20.8*   BE -5       LAST 7 DAYS MICROBIOLOGY   Microbiology Results (last 7 days)     ** No results found for the last 168 hours. **          MOST RECENT IMAGING  X-Ray Chest 1 View  REASON: Statu post CABG  Abdominal pain abd pain, hx of recent CABG    FINDINGS:    Portable chest radiograph comparison chest x-ray October 9, 2020. The  cardiomediastinal silhouette is within normal limits in size. Right  internal jugular central venous catheter is unchanged. Median  sternotomy wires and cutaneous surgical clips again noted.The  pulmonary vascular structures are within normal limits. Bibasilar  linear densities noted consistent with subsegmental atelectasis. No  acute osseous abnormality.    IMPRESSION:    Subsegmental atelectasis at the lung bases.    Electronically Signed by Nathaniel Paredes on 10/9/2020 8:12 AM  US Abdomen Complete  Reason: Abdominal pain    COMPARISON: None    FINDINGS:    Diffuse increased echogenicity throughout hepatic  parenchyma suggest  hepatic steatosis. Although detection of focal hepatic lesions in  setting of steatosis is limited, no focal hepatic mass or intrahepatic  bile duct dilation identified. Hepatopedal flow in main portal vein.    Gallbladder is normal. Common duct diameter is normal.    Visualized pancreas is unremarkable. Spleen is normal.    Juxta hepatic IVC is unremarkable. Visualized aorta is nonaneurysmal.    Right kidney measures 10.5 x 4.6 x 4.3 cm in length, and left kidney  measures 11.2 x 5.5 x 5.8 cm. Kidneys maintain normal cortical  thickness and echogenicity without nephrolithiasis or hydronephrosis.    IMPRESSION:    Increased echogenicity of the liver is consistent with hepatic  steatosis. No other abnormalities identified.    Electronically Signed by Nathaniel Paredes on 10/9/2020 6:44 AM  X-Ray Chest AP Portable  REASON: Post-op Post CABG,; CAD    FINDINGS:    Portable chest radiograph with comparison chest x-ray October 7, 2020.  Enlarged cardiomediastinal silhouette is unchanged. There's been  interval removal of [place] catheter. Right internal jugular central  venous catheter noted. Median sternotomy wires and cutaneous surgical  staples again noted.The pulmonary vascular structures are within  normal limits. Linear densities at the left lung base likely reflect  subsegmental atelectasis. The lungs are otherwise clear. No acute  osseous abnormality.    IMPRESSION:    1.  Linear densities at the left lung base likely reflect  subsegmental atelectasis.  2.  Unchanged enlarged cardiac mediastinal silhouette.  3.  Additional observations as described.    Electronically Signed by Nathaniel Paredes on 10/9/2020 6:34 AM      [facility]  Results for orders placed during the hospital encounter of 09/28/20   Echo Color Flow Doppler? Yes    Narrative · The left ventricle is normal in size with normal systolic function. The   estimated ejection fraction is 60%.  · Normal left ventricular diastolic function.  ·  Normal right ventricular systolic function.  · Normal central venous pressure (3 mmHg).  · The estimated PA systolic pressure is 24 mmHg.          CURRENT/PREVIOUS VISIT EKG  Results for orders placed or performed during the hospital encounter of 10/06/20   EKG 12-lead    Collection Time: 10/09/20  4:12 AM    Narrative    Test Reason : R61,    Vent. Rate : 093 BPM     Atrial Rate : 093 BPM     P-R Int : 160 ms          QRS Dur : 134 ms      QT Int : 366 ms       P-R-T Axes : 052 -21 038 degrees     QTc Int : 455 ms    Normal sinus rhythm  Possible Left atrial enlargement  Right bundle branch block  Septal infarct (cited on or before 25-SEP-2020)  Abnormal ECG  When compared with ECG of 25-SEP-2020 11:35,  Right bundle branch block is now Present  Questionable change in initial forces of Septal leads  Confirmed by Scott Escamilla MD ([id number]) on 10/10/2020 4:31:11 PM    Referred By:             Confirmed By:Scott Escamilla MD       ASSESSMENT/PLAN:     Active Hospital Problems    Diagnosis    *Coronary artery disease of native artery of native heart with stable angina pectoris     Multivessel disease      Pre-op testing       ASSESSMENT & PLAN:   Three-vessel coronary artery disease  2.  Status post coronary bypass grafting  3.  Constipation with abdominal pain possible mild ileus  4.  Essential hypertension  5.  Mixed hyperlipidemia      RECOMMENDATIONS:  1.  Patient is doing very well is moving gas and feels better.  Hopefully he will have a bowel movement.  2.  Stable from coronary artery bypass grafting.  Chest tube drains are out earlier on and today the pacer and the cortisol were also removed.  3.  Continue current management  4.  Home soon        Segundo Bashir MD  [facility]  Department of Cardiology  Date of Service: 10/10/2020  4:46 PM

## 2020-10-10 NOTE — NURSING
Pt still with c/o feeling like temp is going up and down saying he feels hot and then cold. Temp has not gone higher then 99.6. Pt still with mild c/o epigastric pain, morphine and norco given with good response. Encouraged to turn, cough, and deep breath. Encouraged use of I/S while awake. Pt remains NSR 70-90 with BP better controlled with pain control 130/60s. Will continue to monitor.

## 2020-10-11 PROCEDURE — 25000003 PHARM REV CODE 250: Performed by: INTERNAL MEDICINE

## 2020-10-11 PROCEDURE — 25000003 PHARM REV CODE 250: Performed by: THORACIC SURGERY (CARDIOTHORACIC VASCULAR SURGERY)

## 2020-10-11 PROCEDURE — 21000000 HC CCU ICU ROOM CHARGE

## 2020-10-11 PROCEDURE — 99232 PR SUBSEQUENT HOSPITAL CARE,LEVL II: ICD-10-PCS | Mod: ,,, | Performed by: INTERNAL MEDICINE

## 2020-10-11 PROCEDURE — 99900035 HC TECH TIME PER 15 MIN (STAT)

## 2020-10-11 PROCEDURE — 94761 N-INVAS EAR/PLS OXIMETRY MLT: CPT

## 2020-10-11 PROCEDURE — 25000003 PHARM REV CODE 250

## 2020-10-11 PROCEDURE — 99232 SBSQ HOSP IP/OBS MODERATE 35: CPT | Mod: ,,, | Performed by: INTERNAL MEDICINE

## 2020-10-11 RX ORDER — SIMETHICONE 80 MG
2 TABLET,CHEWABLE ORAL 3 TIMES DAILY PRN
Status: DISCONTINUED | OUTPATIENT
Start: 2020-10-11 | End: 2020-10-12 | Stop reason: HOSPADM

## 2020-10-11 RX ADMIN — POLYETHYLENE GLYCOL 3350 17 G: 17 POWDER, FOR SOLUTION ORAL at 08:10

## 2020-10-11 RX ADMIN — ASPIRIN 81 MG: 81 TABLET, DELAYED RELEASE ORAL at 08:10

## 2020-10-11 RX ADMIN — SIMETHICONE CHEW TAB 80 MG 160 MG: 80 TABLET ORAL at 09:10

## 2020-10-11 RX ADMIN — AMLODIPINE BESYLATE 5 MG: 5 TABLET ORAL at 08:10

## 2020-10-11 RX ADMIN — SIMETHICONE CHEW TAB 80 MG 160 MG: 80 TABLET ORAL at 05:10

## 2020-10-11 RX ADMIN — FAMOTIDINE 20 MG: 20 TABLET, FILM COATED ORAL at 08:10

## 2020-10-11 RX ADMIN — CHLORHEXIDINE GLUCONATE 15 ML: 1.2 RINSE ORAL at 08:10

## 2020-10-11 RX ADMIN — DOCUSATE SODIUM 100 MG: 100 CAPSULE, LIQUID FILLED ORAL at 08:10

## 2020-10-11 RX ADMIN — HYDROCODONE BITARTRATE AND ACETAMINOPHEN 1 TABLET: 7.5; 325 TABLET ORAL at 07:10

## 2020-10-11 RX ADMIN — POTASSIUM CHLORIDE 10 MEQ: 750 CAPSULE, EXTENDED RELEASE ORAL at 08:10

## 2020-10-11 RX ADMIN — HYDROCODONE BITARTRATE AND ACETAMINOPHEN 1 TABLET: 7.5; 325 TABLET ORAL at 03:10

## 2020-10-11 RX ADMIN — SIMETHICONE CHEW TAB 80 MG 160 MG: 80 TABLET ORAL at 08:10

## 2020-10-11 RX ADMIN — METOPROLOL SUCCINATE 50 MG: 50 TABLET, FILM COATED, EXTENDED RELEASE ORAL at 08:10

## 2020-10-11 RX ADMIN — HYDROCODONE BITARTRATE AND ACETAMINOPHEN 1 TABLET: 5; 325 TABLET ORAL at 08:10

## 2020-10-11 RX ADMIN — MUPIROCIN: 20 OINTMENT TOPICAL at 08:10

## 2020-10-11 RX ADMIN — ATORVASTATIN CALCIUM 80 MG: 40 TABLET, FILM COATED ORAL at 08:10

## 2020-10-11 NOTE — PROGRESS NOTES
[facility]  Department of Cardiology  Progress Note    PATIENT NAME: Louie Wahl  MRN: [id number]  TODAY'S DATE: 10/11/2020  ADMIT DATE: 10/6/2020    SUBJECTIVE     PRINCIPLE PROBLEM: Coronary artery disease of native artery of native heart with stable angina pectoris    INTERVAL HISTORY:    10/11/2020  Patient is awake alert not in acute distress.  Breathing is good denies any shortness of breath or difficulty in breathing.  Patient is ambulating in the hallway.  Patient is in sinus rhythm.    Review of patient's allergies indicates:   Allergen Reactions    Ace inhibitors Hives and Swelling       REVIEW OF SYSTEMS  CARDIOVASCULAR: No recent chest pain, palpitations, arm, neck, or jaw pain  RESPIRATORY: No recent fever, cough chills, SOB or congestion  : No blood in the urine  GI: No Nausea, vomiting, constipation, diarrhea, blood, or reflux.  MUSCULOSKELETAL: No myalgias  NEURO: No lightheadedness or dizziness  EYES: No Double vision, blurry, vision or headache     OBJECTIVE     VITAL SIGNS (Most Recent)  Temp: 99.1 °F (37.3 °C) (10/11/20 0715)  Pulse: 85 (10/11/20 0852)  Resp: 15 (10/11/20 0852)  BP: 113/60 (10/11/20 0715)  SpO2: 95 % (10/11/20 0852)    VENTILATION STATUS  Resp: 15 (10/11/20 0852)  SpO2: 95 % (10/11/20 0852)       I & O (Last 24H):    Intake/Output Summary (Last 24 hours) at 10/11/2020 0956  Last data filed at 10/11/2020 0800  Gross per 24 hour   Intake 540 ml   Output --   Net 540 ml       WEIGHTS  Wt Readings from Last 1 Encounters:   10/10/20 0400 79.6 kg (175 lb 7.8 oz)   10/06/20 0615 81.6 kg (179 lb 14.3 oz)       PHYSICAL EXAM  CONSTITUTIONAL: Well built, well nourished in no apparent distress  NECK: no carotid bruit, no JVD  LUNGS:  Decreased breath sounds at the bases  CHEST WALL: no tenderness  HEART: regular rate and rhythm, S1, S2 normal,  ABDOMEN: soft, non-tender; bowel sounds normal; no masses,  no organomegaly  EXTREMITIES: Extremities normal, no edema  NEURO:  AAO X 3    SCHEDULED MEDS:   amLODIPine  5 mg Oral Daily    aspirin  81 mg Oral Daily    atorvastatin  80 mg Oral QHS    chlorhexidine  15 mL Mouth/Throat BID    docusate sodium  100 mg Oral BID    famotidine  20 mg Oral BID    metoprolol succinate  50 mg Oral BID    mupirocin   Nasal BID    polyethylene glycol  17 g Oral Daily    polyethylene glycol  68 g Oral Once    potassium chloride  10 mEq Oral Q12H       CONTINUOUS INFUSIONS:    PRN MEDS:dextrose 50%, dextrose 50%, HYDROcodone-acetaminophen, HYDROcodone-acetaminophen, morphine, ondansetron, promethazine (PHENERGAN) IVPB, simethicone    LABS AND DIAGNOSTICS     CBC LAST 3 DAYS  Recent Labs   Lab 10/07/20  0343 10/08/20  0438 10/09/20  0417   WBC 13.61* 22.26* 17.30*   RBC 3.42* 3.50* 3.85*   HGB 10.8* 11.1* 12.3*   HCT 32.4*  32.4* 33.5* 36.3*   MCV 95 96 94   MCH 31.6* 31.7* 31.9*   MCHC 33.3 33.1 33.9   RDW 12.8 13.1 12.6    170 191   MPV 11.2 11.2 10.8   GRAN 87.6*  11.9* 82.2*  18.3* 79.0*  13.7*   LYMPH 4.6*  0.6* 9.2*  2.1 9.6*  1.7   MONO 7.3  1.0 7.9  1.8* 10.7  1.9*   BASO 0.01 0.02 0.02   NRBC 0 0 0       COAGULATION LAST 3 DAYS  Recent Labs   Lab 10/06/20  0614 10/06/20  1334   LABPT 13.6 17.4*   INR 1.1 1.5   APTT 28.5 30.3       CHEMISTRY LAST 3 DAYS  Recent Labs   Lab 10/06/20  0614  10/06/20  1334  10/06/20  1954  10/06/20  2136 10/06/20  2242 10/06/20  2353 10/07/20  0343 10/08/20  0438 10/09/20  0417     --  137  --  137  --   --   --   --  141 138 133*   K 4.1  --  3.5  --  4.4  --   --   --   --  4.3 4.1 4.0     --  105  --  106  --   --   --   --  106 99 96   CO2 28  --  23  --  17*  --   --   --   --  26 28 26   ANIONGAP 8  --  9  --  14  --   --   --   --  9 11 11   BUN 18  --  17  --  16  --   --   --   --  15 18 19   CREATININE 0.9  --  1.0  --  1.1  --   --   --   --  0.9 0.9 0.8   *  --  152*  --  240*  --   --   --   --  105 140* 153*   CALCIUM 9.6  --  8.5*  --  8.3*  --   --   --   --   8.5* 8.8 8.7   PH  --    < >  --    < >  --    < > 7.204* 7.317* 7.342*  --   --   --    MG 2.0  --  1.8  --  2.1  --   --   --   --  2.0  --   --    ALBUMIN 4.2  --   --   --   --   --   --   --   --   --   --   --    PROT 7.3  --   --   --   --   --   --   --   --   --   --   --    ALKPHOS 61  --   --   --   --   --   --   --   --   --   --   --    ALT 96*  --   --   --   --   --   --   --   --   --   --   --    AST 65*  --   --   --   --   --   --   --   --   --   --   --    BILITOT 0.8  --   --   --   --   --   --   --   --   --   --   --     < > = values in this interval not displayed.       CARDIAC PROFILE LAST 3 DAYS  No results for input(s): BNP, CPK, CPKMB, LDH, TROPONINI in the last 168 hours.    ENDOCRINE LAST 3 DAYS  No results for input(s): TSH, PROCAL in the last 168 hours.    LAST ARTERIAL BLOOD GAS  ABG  Recent Labs   Lab 10/06/20  2353   PH 7.342*   PO2 69*   PCO2 38.3   HCO3 20.8*   BE -5       LAST 7 DAYS MICROBIOLOGY   Microbiology Results (last 7 days)     ** No results found for the last 168 hours. **          MOST RECENT IMAGING  X-Ray Chest 1 View  REASON: Statu post CABG  Abdominal pain abd pain, hx of recent CABG    FINDINGS:    Portable chest radiograph comparison chest x-ray October 9, 2020. The  cardiomediastinal silhouette is within normal limits in size. Right  internal jugular central venous catheter is unchanged. Median  sternotomy wires and cutaneous surgical clips again noted.The  pulmonary vascular structures are within normal limits. Bibasilar  linear densities noted consistent with subsegmental atelectasis. No  acute osseous abnormality.    IMPRESSION:    Subsegmental atelectasis at the lung bases.    Electronically Signed by Nathaniel Paredes on 10/9/2020 8:12 AM  US Abdomen Complete  Reason: Abdominal pain    COMPARISON: None    FINDINGS:    Diffuse increased echogenicity throughout hepatic parenchyma suggest  hepatic steatosis. Although detection of focal hepatic lesions  in  setting of steatosis is limited, no focal hepatic mass or intrahepatic  bile duct dilation identified. Hepatopedal flow in main portal vein.    Gallbladder is normal. Common duct diameter is normal.    Visualized pancreas is unremarkable. Spleen is normal.    Juxta hepatic IVC is unremarkable. Visualized aorta is nonaneurysmal.    Right kidney measures 10.5 x 4.6 x 4.3 cm in length, and left kidney  measures 11.2 x 5.5 x 5.8 cm. Kidneys maintain normal cortical  thickness and echogenicity without nephrolithiasis or hydronephrosis.    IMPRESSION:    Increased echogenicity of the liver is consistent with hepatic  steatosis. No other abnormalities identified.    Electronically Signed by Nathaniel Paredes on 10/9/2020 6:44 AM  X-Ray Chest AP Portable  REASON: Post-op Post CABG,; CAD    FINDINGS:    Portable chest radiograph with comparison chest x-ray October 7, 2020.  Enlarged cardiomediastinal silhouette is unchanged. There's been  interval removal of [place] catheter. Right internal jugular central  venous catheter noted. Median sternotomy wires and cutaneous surgical  staples again noted.The pulmonary vascular structures are within  normal limits. Linear densities at the left lung base likely reflect  subsegmental atelectasis. The lungs are otherwise clear. No acute  osseous abnormality.    IMPRESSION:    1.  Linear densities at the left lung base likely reflect  subsegmental atelectasis.  2.  Unchanged enlarged cardiac mediastinal silhouette.  3.  Additional observations as described.    Electronically Signed by Nathaniel Paredes on 10/9/2020 6:34 AM      [facility]  Results for orders placed during the hospital encounter of 09/28/20   Echo Color Flow Doppler? Yes    Narrative · The left ventricle is normal in size with normal systolic function. The   estimated ejection fraction is 60%.  · Normal left ventricular diastolic function.  · Normal right ventricular systolic function.  · Normal central venous pressure (3  mmHg).  · The estimated PA systolic pressure is 24 mmHg.          CURRENT/PREVIOUS VISIT EKG  Results for orders placed or performed during the hospital encounter of 10/06/20   EKG 12-lead    Collection Time: 10/09/20  4:12 AM    Narrative    Test Reason : R61,    Vent. Rate : 093 BPM     Atrial Rate : 093 BPM     P-R Int : 160 ms          QRS Dur : 134 ms      QT Int : 366 ms       P-R-T Axes : 052 -21 038 degrees     QTc Int : 455 ms    Normal sinus rhythm  Possible Left atrial enlargement  Right bundle branch block  Septal infarct (cited on or before 25-SEP-2020)  Abnormal ECG  When compared with ECG of 25-SEP-2020 11:35,  Right bundle branch block is now Present  Questionable change in initial forces of Septal leads  Confirmed by Scott Escamilla MD ([id number]) on 10/10/2020 4:31:11 PM    Referred By:             Confirmed By:Scott Escamilla MD       ASSESSMENT/PLAN:     Active Hospital Problems    Diagnosis    *Coronary artery disease of native artery of native heart with stable angina pectoris     Multivessel disease      Pre-op testing       ASSESSMENT & PLAN:   1.  Multivessel coronary artery disease  2.  Status post coronary bypass grafting  3.  Constipation with abdominal pain possible mild ileus  4.  Essential hypertension  5.  Mixed hyperlipidemia      RECOMMENDATIONS:  1.  Patient is doing much better this morning.  Seems to have moved his bowels.  2.  Patient is walking Jose and is breathing well.  Using his incentive spirometry he is now reached up out 2500.  3.  Continue current management.  Blood pressure is adequately controlled.        Segundo Bashir MD  [facility]  Department of Cardiology  Date of Service: 10/11/2020  9:56 AM

## 2020-10-11 NOTE — PLAN OF CARE
10/11/20 0852   PRE-TX-O2   O2 Device (Oxygen Therapy) room air   SpO2 95 %   Pulse Oximetry Type Continuous   $ Pulse Oximetry - Multiple Charge Pulse Oximetry - Multiple   Pulse 85   Resp 15   Respiratory Evaluation   $ Care Plan Tech Time 15 min

## 2020-10-12 VITALS
RESPIRATION RATE: 15 BRPM | HEART RATE: 95 BPM | TEMPERATURE: 99 F | OXYGEN SATURATION: 95 % | SYSTOLIC BLOOD PRESSURE: 116 MMHG | HEIGHT: 68 IN | DIASTOLIC BLOOD PRESSURE: 73 MMHG | BODY MASS INDEX: 26.6 KG/M2 | WEIGHT: 175.5 LBS

## 2020-10-12 PROBLEM — D62 ACUTE BLOOD LOSS ANEMIA: Status: ACTIVE | Noted: 2020-10-12

## 2020-10-12 PROCEDURE — 25000003 PHARM REV CODE 250: Performed by: THORACIC SURGERY (CARDIOTHORACIC VASCULAR SURGERY)

## 2020-10-12 PROCEDURE — 94761 N-INVAS EAR/PLS OXIMETRY MLT: CPT

## 2020-10-12 PROCEDURE — 99232 SBSQ HOSP IP/OBS MODERATE 35: CPT | Mod: ,,, | Performed by: INTERNAL MEDICINE

## 2020-10-12 PROCEDURE — 25000003 PHARM REV CODE 250: Performed by: INTERNAL MEDICINE

## 2020-10-12 PROCEDURE — 99232 PR SUBSEQUENT HOSPITAL CARE,LEVL II: ICD-10-PCS | Mod: ,,, | Performed by: INTERNAL MEDICINE

## 2020-10-12 RX ORDER — ASPIRIN 325 MG
325 TABLET ORAL DAILY
Qty: 100 TABLET | Refills: 10 | Status: SHIPPED | OUTPATIENT
Start: 2020-10-12 | End: 2022-02-07

## 2020-10-12 RX ORDER — HYDROCODONE BITARTRATE AND ACETAMINOPHEN 5; 325 MG/1; MG/1
1 TABLET ORAL EVERY 6 HOURS PRN
Qty: 28 TABLET | Refills: 0
Start: 2020-10-12 | End: 2021-03-23

## 2020-10-12 RX ORDER — SIMETHICONE 80 MG
160 TABLET,CHEWABLE ORAL 3 TIMES DAILY PRN
Refills: 0
Start: 2020-10-12 | End: 2020-11-11

## 2020-10-12 RX ORDER — AMLODIPINE BESYLATE 5 MG/1
5 TABLET ORAL DAILY
Qty: 30 TABLET | Refills: 11 | Status: SHIPPED | OUTPATIENT
Start: 2020-10-13 | End: 2023-01-18 | Stop reason: SDUPTHER

## 2020-10-12 RX ORDER — METOPROLOL SUCCINATE 50 MG/1
50 TABLET, EXTENDED RELEASE ORAL 2 TIMES DAILY
Qty: 60 TABLET | Refills: 11 | Status: SHIPPED | OUTPATIENT
Start: 2020-10-12 | End: 2023-10-16

## 2020-10-12 RX ADMIN — FAMOTIDINE 20 MG: 20 TABLET, FILM COATED ORAL at 09:10

## 2020-10-12 RX ADMIN — SIMETHICONE CHEW TAB 80 MG 160 MG: 80 TABLET ORAL at 09:10

## 2020-10-12 RX ADMIN — DOCUSATE SODIUM 100 MG: 100 CAPSULE, LIQUID FILLED ORAL at 09:10

## 2020-10-12 RX ADMIN — ASPIRIN 81 MG: 81 TABLET, DELAYED RELEASE ORAL at 09:10

## 2020-10-12 RX ADMIN — METOPROLOL SUCCINATE 50 MG: 50 TABLET, FILM COATED, EXTENDED RELEASE ORAL at 09:10

## 2020-10-12 RX ADMIN — POTASSIUM CHLORIDE 10 MEQ: 750 CAPSULE, EXTENDED RELEASE ORAL at 09:10

## 2020-10-12 RX ADMIN — AMLODIPINE BESYLATE 5 MG: 5 TABLET ORAL at 09:10

## 2020-10-12 RX ADMIN — POLYETHYLENE GLYCOL 3350 17 G: 17 POWDER, FOR SOLUTION ORAL at 09:10

## 2020-10-12 NOTE — NURSING
Discharge instructions given to pt, PIV removed, intact, monitor removed. Pt left via wc to family vehicle.

## 2020-10-12 NOTE — PLAN OF CARE
Problem: Adult Inpatient Plan of Care  Goal: Plan of Care Review  Outcome: Met  Goal: Patient-Specific Goal (Individualization)  Outcome: Met  Goal: Absence of Hospital-Acquired Illness or Injury  Outcome: Met  Goal: Optimal Comfort and Wellbeing  Outcome: Met  Goal: Readiness for Transition of Care  Outcome: Met  Goal: Rounds/Family Conference  Outcome: Met     Problem: Infection  Goal: Infection Symptom Resolution  Outcome: Met     Problem: Skin Injury Risk Increased  Goal: Skin Health and Integrity  Outcome: Met     Problem: Oral Intake Inadequate  Goal: Improved Oral Intake  Outcome: Met     Problem: Communication Impairment (Mechanical Ventilation, Invasive)  Goal: Effective Communication  Outcome: Met     Problem: Device-Related Complication Risk (Mechanical Ventilation, Invasive)  Goal: Optimal Device Function  Outcome: Met     Problem: Inability to Wean (Mechanical Ventilation, Invasive)  Goal: Mechanical Ventilation Liberation  Outcome: Met     Problem: Nutrition Impairment (Mechanical Ventilation, Invasive)  Goal: Optimal Nutrition Delivery  Outcome: Met     Problem: Skin and Tissue Injury (Mechanical Ventilation, Invasive)  Goal: Absence of Device-Related Skin and Tissue Injury  Outcome: Met     Problem: Ventilator-Induced Lung Injury (Mechanical Ventilation, Invasive)  Goal: Absence of Ventilator-Induced Lung Injury  Outcome: Met     Problem: Communication Impairment (Artificial Airway)  Goal: Effective Communication  Outcome: Met     Problem: Device-Related Complication Risk (Artificial Airway)  Goal: Optimal Device Function  Outcome: Met     Problem: Skin and Tissue Injury (Artificial Airway)  Goal: Absence of Device-Related Skin or Tissue Injury  Outcome: Met     Problem: Noninvasive Ventilation Acute  Goal: Effective Unassisted Ventilation and Oxygenation  Outcome: Met     Problem: Fall Injury Risk  Goal: Absence of Fall and Fall-Related Injury  Outcome: Met     Problem: Restraint, Nonbehavioral  (Nonviolent)  Goal: Discontinuation Criteria Achieved  Outcome: Met  Goal: Personal Dignity and Safety Maintained  Outcome: Met

## 2020-10-12 NOTE — PLAN OF CARE
10/11/20 1939   Patient Assessment/Suction   Level of Consciousness (AVPU) alert   Respiratory Effort Normal;Unlabored   Expansion/Accessory Muscles/Retractions no use of accessory muscles   PRE-TX-O2   O2 Device (Oxygen Therapy) room air   SpO2 96 %   Pulse Oximetry Type Continuous   $ Pulse Oximetry - Multiple Charge Pulse Oximetry - Multiple   Pulse 88   Resp 20   Incentive Spirometer   $ Incentive Spirometer Charges other (see comments)  (pt. states he obtains volumes of 2.5l)   continue to encourage incentive spirometer

## 2020-10-12 NOTE — PLAN OF CARE
10/12/20 1240   Final Note   Assessment Type Final Discharge Note   Anticipated Discharge Disposition Home

## 2020-10-12 NOTE — DISCHARGE SUMMARY
"[facility]  General Surgery  Discharge Summary      Patient Name: Louie Wahl  MRN: [id number]  Admission Date: 10/6/2020  Hospital Length of Stay: 6 days  Discharge Date and Time:  10/12/2020 12:08 PM  Attending Physician: Long Garvin MD   Discharging Provider: Long Garvin MD  Primary Care Provider: Rachel Nuñez NP    HPI:          The patient is a 60-year-old male smoker, who reports a history of exertional related chest pain and shortness of breath over the preceding two years.  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  The decision was made at that time to treat him with anticoagulation, and anti-platelet therapy.  On follow-up of transesophageal echocardiography in March of 2020, the thrombus was no longer present.  On September 30, 2020 the patient underwent scheduled diagnostic coronary angiography by his cardiologist, Dr. Nelli Dickens, revealing triple-vessel coronary artery disease and a left ventricular ejection fraction of 50%.  Repeat transesophageal echocardiography failed to reveal any recurrence of the aortic arch thrombus.  No significant valvular pathology was identified.  The patient underwent consultation with the Thoracic Surgery Service.  Following this discussion, he decided proceed with surgical coronary revascularization.    Procedure(s) (LRB):  CORONARY ARTERY BYPASS GRAFT (CABG) (N/A)  SURGICAL PROCUREMENT, VEIN, ENDOSCOPIC (Left)      Indwelling Lines/Drains at time of discharge:   Lines/Drains/Airways     None               Hospital Course:        On October 6, 2020, the patient was admitted to [facility] and underwent scheduled Aorto-coronary bypass grafting x 5 vessels ( Saphenous Vein Graft to the Left Anterior Descending coronary artery, a natural "Y" Saphenous Vein Graft to the Ramus Intermedius coronary artery and Distal Left Circumflex coronary " artery, and a Sequential Style Saphenous Vein Graft to the Right Posterior Descending coronary artery and Right Inferior Ventricular Branch ).  The patient tolerated the procedure well, and by POD #1,  he was extubated with satisfactory hemodynamics and neurologic status.  He had a stable and progressive recovery, punctuated by of acute anemia secondary to the expected blood loss of surgery and an episode of constipation which has resolved.  He did not require transfusion.  By POD #6, he remains afebrile, hemodynamically stable, and in sinus rhythm.  He is ambulating without difficulty on room air oxygen.  His wounds are clean and dry.  He is ready for discharge to home.    Consults:   Consults (From admission, onward)        Status Ordering Provider     Inpatient consult to Cardio Pulmonary Rehab  Once     Provider:  (Not yet assigned)    Acknowledged KELLY RENE     Inpatient consult to Cardiology  Once     Provider:  Angela Dickens MD    Acknowledged KELLY RENE     Inpatient consult to Gastroenterology  Once     Provider:  Iván Patino III, MD    Completed KELLY RENE          Significant Diagnostic Studies: Radiology: X-Ray: CXR: X-Ray Chest 1 View (CXR):   Results for orders placed or performed during the hospital encounter of 10/06/20   X-Ray Chest 1 View    Narrative    REASON: Statu post CABG  Abdominal pain abd pain, hx of recent CABG    FINDINGS:    Portable chest radiograph comparison chest x-ray October 9, 2020. The  cardiomediastinal silhouette is within normal limits in size. Right  internal jugular central venous catheter is unchanged. Median  sternotomy wires and cutaneous surgical clips again noted.The  pulmonary vascular structures are within normal limits. Bibasilar  linear densities noted consistent with subsegmental atelectasis. No  acute osseous abnormality.    IMPRESSION:    Subsegmental atelectasis at the lung bases.    Electronically Signed by Nathaniel Paredes  on 10/9/2020 8:12 AM       Pending Diagnostic Studies:     None        Final Active Diagnoses:    Diagnosis Date Noted POA    PRINCIPAL PROBLEM:  Coronary artery disease of native artery of native heart with stable angina pectoris [I25.118] 09/22/2020 Yes    Acute blood loss anemia [D62] 10/12/2020 Unknown    Pre-op testing [Z01.818] 10/06/2020 Not Applicable      Problems Resolved During this Admission:      Discharged Condition: good    Disposition: Home or Self Care    Follow Up:  Follow-up Information     Long Garvin MD On 10/22/2020.    Specialty: Cardiothoracic Surgery  Why: Call [phone] to [facility] up appointment with Dr. Garvin  Contact information:  [address] [address]  [facility] [address] [phone]             Angela Dickens MD In 2 weeks.    Specialties: INTERVENTIONAL CARDIOLOGY, Cardiology, Cardiovascular Disease  Why: Call Dr. Dickens's office to schedule a follow up appointment  Contact information:  [address] [facility]  [address] [place] [address] [phone]                 Patient Instructions:      Type And Screen Preop   Standing Status: Future Number of Occurrences: 1 Standing Exp. Date: 12/04/21     Medications:  Reconciled Home Medications:      Medication List      START taking these medications    amLODIPine 5 MG tablet  Commonly known as: NORVASC  Take 1 tablet (5 mg total) by mouth once daily.  Start taking on: October 13, 2020     aspirin 325 MG tablet  Take 1 tablet (325 mg total) by mouth once daily.  Replaces: aspirin 81 MG Chew     HYDROcodone-acetaminophen 5-325 mg per tablet  Commonly known as: NORCO  Take 1 tablet by mouth every 6 (six) hours as needed.     simethicone 80 MG chewable tablet  Commonly known as: MYLICON  Take 2 tablets (160 mg total) by mouth 3 (three) times daily as needed for Flatulence.        CHANGE how you take these medications    metoprolol succinate 50 MG 24 hr tablet  Commonly known as: TOPROL-XL  Take 1 tablet (50  mg total) by mouth 2 (two) times daily.  What changed:   · when to take this  · additional instructions        CONTINUE taking these medications    atorvastatin 80 MG tablet  Commonly known as: LIPITOR  Take 80 mg by mouth every evening.     clopidogreL 75 mg tablet  Commonly known as: PLAVIX  Take 75 mg by mouth once daily.     fish oil-omega-3 fatty acids 300-1,000 mg capsule  Take 1 capsule by mouth 2 (two) times a day.     pantoprazole 40 MG tablet  Commonly known as: PROTONIX  Take 40 mg by mouth once daily.     SPECTRAVITE ADULT ORAL  Take by mouth.        STOP taking these medications    aspirin 81 MG Chew  Replaced by: aspirin 325 MG tablet     ELIQUIS 5 mg Tab  Generic drug: apixaban     isosorbide mononitrate 30 MG 24 hr tablet  Commonly known as: IMDUR          Time spent on the discharge of patient: 15 minutes    Long Garvin MD  General Surgery  [facility]

## 2020-10-12 NOTE — PROGRESS NOTES
[facility]  Cardiology  Progress Note    Patient Name: Louie Wahl  MRN: [id number]  Admission Date: 10/6/2020  Hospital Length of Stay: 6 days  Code Status: Full Code   Attending Physician: Long Garvin MD   Primary Care Physician: Rachel Nuñez NP  Expected Discharge Date:   Principal Problem:Coronary artery disease of native artery of native heart with stable angina pectoris    Subjective:       Interval History: Reports that the abdominal pain is better however he has minimal 2/10 pain.  He is moving his bowels.  He has minimal pain at the surgical site.  He denies any shortness of breath.    ROS   Denies any focal weakness.   Denies any bleeding issues.   Objective:     Vital Signs (Most Recent):  Temp: 98.5 °F (36.9 °C) (10/12/20 0747)  Pulse: 87 (10/12/20 0747)  Resp: 15 (10/12/20 0747)  BP: 116/73 (10/12/20 0747)  SpO2: 95 % (10/12/20 0747) Vital Signs (24h Range):  Temp:  [98.4 °F (36.9 °C)-99.8 °F (37.7 °C)] 98.5 °F (36.9 °C)  Pulse:  [84-97] 87  Resp:  [15-20] 15  SpO2:  [95 %-99 %] 95 %  BP: (111-137)/(55-73) 116/73     Weight: 79.6 kg (175 lb 7.8 oz)  Body mass index is 26.68 kg/m².    SpO2: 95 %  O2 Device (Oxygen Therapy): room air    No intake or output data in the 24 hours ending 10/12/20 0840    Lines/Drains/Airways     Peripheral Intravenous Line                 Peripheral IV - Single Lumen 10/10/20 1745 20 G Anterior;Right Forearm 1 day                Scheduled Meds:   amLODIPine  5 mg Oral Daily    aspirin  81 mg Oral Daily    atorvastatin  80 mg Oral QHS    docusate sodium  100 mg Oral BID    famotidine  20 mg Oral BID    metoprolol succinate  50 mg Oral BID    polyethylene glycol  17 g Oral Daily    polyethylene glycol  68 g Oral Once    potassium chloride  10 mEq Oral Q12H     Continuous Infusions:    PRN Meds:.dextrose 50%, dextrose 50%, HYDROcodone-acetaminophen, HYDROcodone-acetaminophen, morphine, ondansetron, promethazine (PHENERGAN) IVPB, simethicone      Physical Exam  HEENT: Normocephalic, atraumatic, PERRL, Conjunctiva pink, no scleral icterus.   CVS: S1S2+, RRR, no murmurs, rubs or gallops, JVP: Normal. RIJ CVL in place.   LUNGS: Clear  ABDOMEN: Soft, NT, BS+  EXTREMITIES: No cyanosis, clubbing or edema  NEURO: AAO X 3.   STERNOTOMY: C/D/I    Significant Labs:   BMP:   No results for input(s): GLU, NA, K, CL, CO2, BUN, CREATININE, CALCIUM, MG in the last 48 hours., CMP   No results for input(s): NA, K, CL, CO2, GLU, BUN, CREATININE, CALCIUM, PROT, ALBUMIN, BILITOT, ALKPHOS, AST, ALT, ANIONGAP, ESTGFRAFRICA, EGFRNONAA in the last 48 hours., CBC   No results for input(s): WBC, HGB, HCT, PLT in the last 48 hours., INR   No results for input(s): INR, PROTIME in the last 48 hours., Lipid Panel No results for input(s): CHOL, HDL, LDLCALC, TRIG, CHOLHDL in the last 48 hours. and Troponin No results for input(s): TROPONINI in the last 48 hours.    Significant Imaging: Reviewed  Assessment and Plan:     IMPRESSION:  CAD. 60% distal left main, sever proximal to mid LCx and pRCA lesions.   S/p CABG X 5 on 10/6/2020 by Dr Garvin(SVG to LAD, sequential SVG to rPDA/inferior ventricular branch, natural Y sequential SVG to Ramus and distal LCx). Hemodynamically stable.   Disctal aortic arch thrombus. Resolved on DIONISIO done in 3/2020.   Hypertension.   Dyslipidemia.   Anemia. Post op. Recent H&H acceptable.   Abdominal pain/Constipation. Improved.    PLAN:  1.  Okay to discharge home from Cardiology standpoint.  2.  He was instructed to take all his medications at home except Eliquis.  He was advised to even resume Plavix.  3.  Follow-up in the clinic in about 1-2 weeks or sooner if needed.  4.  Discussed with Dr. Garvin.     Angela Dickens MD  Cardiology  [facility]

## 2020-10-12 NOTE — HOSPITAL COURSE
"       On October 6, 2020, the patient was admitted to [facility] and underwent scheduled Aorto-coronary bypass grafting x 5 vessels ( Saphenous Vein Graft to the Left Anterior Descending coronary artery, a natural "Y" Saphenous Vein Graft to the Ramus Intermedius coronary artery and Distal Left Circumflex coronary artery, and a Sequential Style Saphenous Vein Graft to the Right Posterior Descending coronary artery and Right Inferior Ventricular Branch ).  The patient tolerated the procedure well, and by POD #1,  he was extubated with satisfactory hemodynamics and neurologic status.  He had a stable and progressive recovery, punctuated by of acute anemia secondary to the expected blood loss of surgery and an episode of constipation which has resolved.  He did not require transfusion.  By POD #6, he remains afebrile, hemodynamically stable, and in sinus rhythm.  He is ambulating without difficulty on room air oxygen.  His wounds are clean and dry.  He is ready for discharge to home.  "

## 2020-10-14 ENCOUNTER — TELEPHONE (OUTPATIENT)
Dept: CARDIAC REHAB | Facility: HOSPITAL | Age: 60
End: 2020-10-14

## 2020-10-14 NOTE — TELEPHONE ENCOUNTER
Louie Wahl   [id number]   10/14/2020         Spoke with: pt    Received Medications?:yes    Follow Up Appt?:yes    Cardio Pulmonary Rehab Appt?:no    Comments: pt states he is doing well, received all meds. Pt unable to attend cardiac rehab due to insurance. Pt states he is walking daily    Raine Camacho RN

## 2020-10-16 ENCOUNTER — TELEPHONE (OUTPATIENT)
Dept: CARDIOLOGY | Facility: CLINIC | Age: 60
End: 2020-10-16

## 2020-10-16 NOTE — TELEPHONE ENCOUNTER
----- Message from Ghada Pineda sent at 10/15/2020  3:31 PM CDT -----  Regarding: hospital follow up  Please call patient to schedule  [phone]

## 2020-10-20 ENCOUNTER — OFFICE VISIT (OUTPATIENT)
Dept: CARDIOLOGY | Facility: CLINIC | Age: 60
End: 2020-10-20
Payer: MEDICAID

## 2020-10-20 VITALS
WEIGHT: 182 LBS | BODY MASS INDEX: 27.58 KG/M2 | HEIGHT: 68 IN | SYSTOLIC BLOOD PRESSURE: 136 MMHG | RESPIRATION RATE: 16 BRPM | HEART RATE: 77 BPM | OXYGEN SATURATION: 98 % | DIASTOLIC BLOOD PRESSURE: 78 MMHG

## 2020-10-20 DIAGNOSIS — E78.5 DYSLIPIDEMIA: ICD-10-CM

## 2020-10-20 DIAGNOSIS — I74.10 THROMBUS OF AORTA: ICD-10-CM

## 2020-10-20 DIAGNOSIS — D62 ACUTE BLOOD LOSS ANEMIA: ICD-10-CM

## 2020-10-20 DIAGNOSIS — I25.118 CORONARY ARTERY DISEASE OF NATIVE ARTERY OF NATIVE HEART WITH STABLE ANGINA PECTORIS: Primary | ICD-10-CM

## 2020-10-20 DIAGNOSIS — I10 ESSENTIAL HYPERTENSION: ICD-10-CM

## 2020-10-20 PROCEDURE — 99214 OFFICE O/P EST MOD 30 MIN: CPT | Mod: S$GLB,,, | Performed by: INTERNAL MEDICINE

## 2020-10-20 PROCEDURE — 99214 PR OFFICE/OUTPT VISIT, EST, LEVL IV, 30-39 MIN: ICD-10-PCS | Mod: S$GLB,,, | Performed by: INTERNAL MEDICINE

## 2020-11-19 ENCOUNTER — HOSPITAL ENCOUNTER (OUTPATIENT)
Dept: RADIOLOGY | Facility: HOSPITAL | Age: 60
Discharge: HOME | End: 2020-11-19
Attending: INTERNAL MEDICINE
Payer: MEDICAID

## 2020-11-19 DIAGNOSIS — I25.118 CORONARY ARTERY DISEASE OF NATIVE ARTERY OF NATIVE HEART WITH STABLE ANGINA PECTORIS: ICD-10-CM

## 2020-11-19 PROCEDURE — 71046 X-RAY EXAM CHEST 2 VIEWS: CPT | Mod: TC

## 2020-11-24 ENCOUNTER — OFFICE VISIT (OUTPATIENT)
Dept: CARDIOLOGY | Facility: CLINIC | Age: 60
End: 2020-11-24
Payer: MEDICAID

## 2020-11-24 VITALS
SYSTOLIC BLOOD PRESSURE: 130 MMHG | HEART RATE: 71 BPM | HEIGHT: 68 IN | DIASTOLIC BLOOD PRESSURE: 80 MMHG | OXYGEN SATURATION: 99 % | WEIGHT: 184 LBS | RESPIRATION RATE: 16 BRPM | BODY MASS INDEX: 27.89 KG/M2

## 2020-11-24 DIAGNOSIS — I74.10 THROMBUS OF AORTA: ICD-10-CM

## 2020-11-24 DIAGNOSIS — E78.5 DYSLIPIDEMIA: ICD-10-CM

## 2020-11-24 DIAGNOSIS — I10 ESSENTIAL HYPERTENSION: ICD-10-CM

## 2020-11-24 DIAGNOSIS — I25.118 CORONARY ARTERY DISEASE OF NATIVE ARTERY OF NATIVE HEART WITH STABLE ANGINA PECTORIS: Primary | ICD-10-CM

## 2020-11-24 PROCEDURE — 99214 OFFICE O/P EST MOD 30 MIN: CPT | Mod: S$GLB,,, | Performed by: INTERNAL MEDICINE

## 2020-11-24 PROCEDURE — 99214 PR OFFICE/OUTPT VISIT, EST, LEVL IV, 30-39 MIN: ICD-10-PCS | Mod: S$GLB,,, | Performed by: INTERNAL MEDICINE

## 2020-11-24 NOTE — PROGRESS NOTES
Subjective:    Patient ID:  Louie Wahl is a 60 y.o. male     HPI  Patient presents to the clinic today for follow-up for his coronary artery disease status post CABG, distal aortic arch thrombus, dyslipidemia, hypertension.  Since last clinic visit he denies any hospitalizations or emergency room visits.  Overall he is doing good.  He reports some soreness in the chest wall.  He denies any chest pains requiring any sublingual nitroglycerin.  He denies any shortness of breath with usual activities.  He has back pain is at baseline.  He denies any bleeding issues.  He denies any symptoms suggestive of stroke or TIA.  His labs were reviewed and were discussed with the patient.    Current Outpatient Medications   Medication Instructions    amLODIPine (NORVASC) 5 mg, Oral, Daily    aspirin 325 mg, Oral, Daily    atorvastatin (LIPITOR) 80 mg, Oral, Nightly    clopidogreL (PLAVIX) 75 mg, Oral, Daily    HYDROcodone-acetaminophen (NORCO) 5-325 mg per tablet 1 tablet, Oral, Every 6 hours PRN    metoprolol succinate (TOPROL-XL) 50 mg, Oral, 2 times daily    multivit-minerals/folic acid (SPECTRAVITE ADULT ORAL) Oral    omega-3 fatty acids/fish oil (FISH OIL-OMEGA-3 FATTY ACIDS) 300-1,000 mg capsule 1 capsule, Oral, 2 times daily    pantoprazole (PROTONIX) 40 mg, Oral, Daily        Review of Systems   Constitution: Negative for decreased appetite, fever, malaise/fatigue, weight gain and weight loss.   HENT: Negative for ear pain and sore throat.    Eyes: Negative for double vision and pain.   Cardiovascular: Negative for chest pain, palpitations and syncope.   Respiratory: Negative for cough and hemoptysis.    Endocrine: Negative for heat intolerance and polydipsia.   Hematologic/Lymphatic: Negative for bleeding problem.   Skin: Negative for rash.   Musculoskeletal: Negative for stiffness.   Gastrointestinal: Negative for abdominal pain, jaundice and vomiting.   Genitourinary: Negative for dysuria.   Neurological:  "Negative for seizures and tremors.   Psychiatric/Behavioral: Negative for altered mental status, hallucinations and suicidal ideas.        Objective:     Vitals:    11/24/20 0959   BP: 130/80   BP Location: Left arm   Patient Position: Sitting   BP Method: Medium (Manual)   Pulse: 71   Resp: 16   SpO2: 99%   Weight: 83.5 kg (184 lb)   Height: 5' 8" (1.727 m)       Physical Exam   Constitutional: He is oriented to person, place, and time. He appears well-developed and well-nourished.   HENT:   Head: Normocephalic and atraumatic.   Eyes: Pupils are equal, round, and reactive to light. Conjunctivae are normal.   Neck: Neck supple. No JVD present.   Cardiovascular: Normal rate, regular rhythm, S1 normal, S2 normal and normal heart sounds. Exam reveals no gallop and no friction rub.   No murmur heard.  Pulses:       Carotid pulses are 2+ on the right side and 2+ on the left side.       Posterior tibial pulses are 2+ on the right side and 2+ on the left side.   Pulmonary/Chest: No stridor. No respiratory distress. He has no wheezes. He has no rales.   Sternotomy: C/D/I   Abdominal: Soft. He exhibits no distension. There is no abdominal tenderness.   Musculoskeletal:         General: No edema.   Neurological: He is alert and oriented to person, place, and time.   Skin: Skin is warm and dry. No burn and no rash noted. No cyanosis. Nails show no clubbing.   Psychiatric: His behavior is normal. He expresses no suicidal ideation.        Results for orders placed during the hospital encounter of 09/28/20   Echo Color Flow Doppler? Yes    Narrative · The left ventricle is normal in size with normal systolic function. The   estimated ejection fraction is 60%.  · Normal left ventricular diastolic function.  · Normal right ventricular systolic function.  · Normal central venous pressure (3 mmHg).  · The estimated PA systolic pressure is 24 mmHg.         Results for orders placed during the hospital encounter of 09/28/20 "   Intra-Procedure Documentation    Narrative Indication:   Known mobile distal aortic arch plaque with mobile thrombus.     Counseling:   Patient as well as his family member were informed of the risks, benefits   as well as alternatives to the procedure and informed consent was   obtained.      Procedure:  After obtaining informed consent the patient was sedated with the help of   Anesthesia team. A DIONISIO probe was advanced without difficulty and images   were obtained. Patient tolerated the procedure well and no immediate   complications were noted.     Complications:   None     Findings:  1. Aortic valve: Noncoronary cusp of the aortic valve appears mildly thick   and calcified with adequate leaflet motion. No significant regurgitation.  2. Mitral valve is structurally normal with good leaflet excursion. Trace   regurgitation.   3. Tricuspid valve is structurally normal with good leaflet excursion. No   significant regurgitation.  4. Pulmonary valve is structurally normal with good leaflet excursion.   Trace regurgitation.  5. Overall LVEF appears normal.   6. Severe atherosclerotic plaque visualized in the visualized portions of   the distal arch and descending thoracic aorta. No obvious mobile component   noted.   7. Lipomatous hypertrophy of the interatrial septum.   8. Trace pericardial effusion.      Conclusions:  1. Severe atherosclerotic plaque noted in the visualized portions of the   distal arch and descending thoracic aorta. No obvious mobile component   noted. Essentially unchanged from previous study.   2. Trace pericardial effusion.           Assessment:       Problem List Items Addressed This Visit        Cardiac/Vascular    Coronary artery disease of native artery of native heart with stable angina pectoris - Primary    Overview     S/p CABG X 5 on 10/6/2020 by Dr Garvin(SVG to LAD, sequential SVG to rPDA/inferior ventricular branch, natural Y sequential SVG to Ramus and distal LCx).           Essential hypertension    Dyslipidemia       Hematology    History of aortic arch thrombus    Overview     Distal Aortic arch thrombus. Likely on a plaque. Diagnosed by DIONISIO. Very mobile.  Resolved on repeat transesophageal echocardiogram with no mobile component noted on 3/17/2020.                  Plan:       1.  Continue current medical regimen without any changes.  2.  Return to the clinic in about 3 months or sooner if needed.  3.  Check fasting lipid profile prior to the next clinic visit.

## 2021-03-23 ENCOUNTER — OFFICE VISIT (OUTPATIENT)
Dept: CARDIOLOGY | Facility: CLINIC | Age: 61
End: 2021-03-23
Payer: MEDICAID

## 2021-03-23 VITALS
DIASTOLIC BLOOD PRESSURE: 86 MMHG | RESPIRATION RATE: 18 BRPM | OXYGEN SATURATION: 97 % | HEIGHT: 68 IN | BODY MASS INDEX: 28.34 KG/M2 | WEIGHT: 187 LBS | SYSTOLIC BLOOD PRESSURE: 146 MMHG | HEART RATE: 66 BPM

## 2021-03-23 DIAGNOSIS — Z95.1 S/P CABG X 5: ICD-10-CM

## 2021-03-23 DIAGNOSIS — E78.5 DYSLIPIDEMIA: Primary | ICD-10-CM

## 2021-03-23 DIAGNOSIS — I74.10 THROMBUS OF AORTA: ICD-10-CM

## 2021-03-23 DIAGNOSIS — I10 ESSENTIAL HYPERTENSION: ICD-10-CM

## 2021-03-23 PROCEDURE — 99213 PR OFFICE/OUTPT VISIT, EST, LEVL III, 20-29 MIN: ICD-10-PCS | Mod: S$GLB,,, | Performed by: NURSE PRACTITIONER

## 2021-03-23 PROCEDURE — 93000 ELECTROCARDIOGRAM COMPLETE: CPT | Mod: S$GLB,,, | Performed by: INTERNAL MEDICINE

## 2021-03-23 PROCEDURE — 93000 EKG 12-LEAD: ICD-10-PCS | Mod: S$GLB,,, | Performed by: INTERNAL MEDICINE

## 2021-03-23 PROCEDURE — 99213 OFFICE O/P EST LOW 20 MIN: CPT | Mod: S$GLB,,, | Performed by: NURSE PRACTITIONER

## 2021-03-25 ENCOUNTER — TELEPHONE (OUTPATIENT)
Dept: CARDIOLOGY | Facility: CLINIC | Age: 61
End: 2021-03-25

## 2021-03-25 DIAGNOSIS — I10 ESSENTIAL HYPERTENSION: Primary | ICD-10-CM

## 2021-04-07 RX ORDER — CLOPIDOGREL BISULFATE 75 MG/1
75 TABLET ORAL DAILY
Qty: 90 TABLET | Refills: 3 | Status: SHIPPED | OUTPATIENT
Start: 2021-04-07 | End: 2022-04-07

## 2021-06-09 DIAGNOSIS — K76.0 FATTY (CHANGE OF) LIVER, NOT ELSEWHERE CLASSIFIED: Primary | ICD-10-CM

## 2021-06-22 ENCOUNTER — LAB VISIT (OUTPATIENT)
Dept: LAB | Facility: HOSPITAL | Age: 61
End: 2021-06-22
Attending: NURSE PRACTITIONER
Payer: MEDICAID

## 2021-06-22 DIAGNOSIS — E78.5 DYSLIPIDEMIA: ICD-10-CM

## 2021-06-22 LAB
ALBUMIN SERPL BCP-MCNC: 4.2 G/DL (ref 3.5–5.2)
ALP SERPL-CCNC: 88 U/L (ref 55–135)
ALT SERPL W/O P-5'-P-CCNC: 80 U/L (ref 10–44)
ANION GAP SERPL CALC-SCNC: 7 MMOL/L (ref 8–16)
AST SERPL-CCNC: 74 U/L (ref 10–40)
BASOPHILS # BLD AUTO: 0.04 K/UL (ref 0–0.2)
BASOPHILS NFR BLD: 0.5 % (ref 0–1.9)
BILIRUB SERPL-MCNC: 1 MG/DL (ref 0.1–1)
BUN SERPL-MCNC: 18 MG/DL (ref 6–20)
CALCIUM SERPL-MCNC: 9.2 MG/DL (ref 8.7–10.5)
CHLORIDE SERPL-SCNC: 99 MMOL/L (ref 95–110)
CHOLEST SERPL-MCNC: 136 MG/DL (ref 120–199)
CHOLEST/HDLC SERPL: 5.2 {RATIO} (ref 2–5)
CO2 SERPL-SCNC: 28 MMOL/L (ref 23–29)
CREAT SERPL-MCNC: 1 MG/DL (ref 0.5–1.4)
DIFFERENTIAL METHOD: ABNORMAL
EOSINOPHIL # BLD AUTO: 0.2 K/UL (ref 0–0.5)
EOSINOPHIL NFR BLD: 3 % (ref 0–8)
ERYTHROCYTE [DISTWIDTH] IN BLOOD BY AUTOMATED COUNT: 12 % (ref 11.5–14.5)
EST. GFR  (AFRICAN AMERICAN): >60 ML/MIN/1.73 M^2
EST. GFR  (NON AFRICAN AMERICAN): >60 ML/MIN/1.73 M^2
GLUCOSE SERPL-MCNC: 146 MG/DL (ref 70–110)
HCT VFR BLD AUTO: 45.2 % (ref 40–54)
HDLC SERPL-MCNC: 26 MG/DL (ref 40–75)
HDLC SERPL: 19.1 % (ref 20–50)
HGB BLD-MCNC: 15.3 G/DL (ref 14–18)
IMM GRANULOCYTES # BLD AUTO: 0.03 K/UL (ref 0–0.04)
IMM GRANULOCYTES NFR BLD AUTO: 0.4 % (ref 0–0.5)
LDLC SERPL CALC-MCNC: 77.2 MG/DL (ref 63–159)
LYMPHOCYTES # BLD AUTO: 2.4 K/UL (ref 1–4.8)
LYMPHOCYTES NFR BLD: 29.7 % (ref 18–48)
MCH RBC QN AUTO: 32.2 PG (ref 27–31)
MCHC RBC AUTO-ENTMCNC: 33.8 G/DL (ref 32–36)
MCV RBC AUTO: 95 FL (ref 82–98)
MONOCYTES # BLD AUTO: 0.8 K/UL (ref 0.3–1)
MONOCYTES NFR BLD: 10.2 % (ref 4–15)
NEUTROPHILS # BLD AUTO: 4.5 K/UL (ref 1.8–7.7)
NEUTROPHILS NFR BLD: 56.2 % (ref 38–73)
NONHDLC SERPL-MCNC: 110 MG/DL
NRBC BLD-RTO: 0 /100 WBC
PLATELET # BLD AUTO: 273 K/UL (ref 150–450)
PMV BLD AUTO: 10 FL (ref 9.2–12.9)
POTASSIUM SERPL-SCNC: 4.2 MMOL/L (ref 3.5–5.1)
PROT SERPL-MCNC: 7.8 G/DL (ref 6–8.4)
RBC # BLD AUTO: 4.75 M/UL (ref 4.6–6.2)
SODIUM SERPL-SCNC: 134 MMOL/L (ref 136–145)
TRIGL SERPL-MCNC: 164 MG/DL (ref 30–150)
WBC # BLD AUTO: 7.98 K/UL (ref 3.9–12.7)

## 2021-06-22 PROCEDURE — 36415 COLL VENOUS BLD VENIPUNCTURE: CPT | Performed by: NURSE PRACTITIONER

## 2021-06-22 PROCEDURE — 80053 COMPREHEN METABOLIC PANEL: CPT | Performed by: NURSE PRACTITIONER

## 2021-06-22 PROCEDURE — 80061 LIPID PANEL: CPT | Performed by: NURSE PRACTITIONER

## 2021-06-22 PROCEDURE — 85025 COMPLETE CBC W/AUTO DIFF WBC: CPT | Performed by: NURSE PRACTITIONER

## 2021-06-24 ENCOUNTER — OFFICE VISIT (OUTPATIENT)
Dept: CARDIOLOGY | Facility: CLINIC | Age: 61
End: 2021-06-24
Payer: MEDICAID

## 2021-06-24 VITALS
HEART RATE: 75 BPM | WEIGHT: 187 LBS | DIASTOLIC BLOOD PRESSURE: 80 MMHG | HEIGHT: 68 IN | BODY MASS INDEX: 28.34 KG/M2 | SYSTOLIC BLOOD PRESSURE: 158 MMHG | OXYGEN SATURATION: 95 %

## 2021-06-24 DIAGNOSIS — E78.5 DYSLIPIDEMIA: ICD-10-CM

## 2021-06-24 DIAGNOSIS — I10 ESSENTIAL HYPERTENSION: Primary | ICD-10-CM

## 2021-06-24 DIAGNOSIS — I25.118 CORONARY ARTERY DISEASE OF NATIVE ARTERY OF NATIVE HEART WITH STABLE ANGINA PECTORIS: ICD-10-CM

## 2021-06-24 PROCEDURE — 99214 PR OFFICE/OUTPT VISIT, EST, LEVL IV, 30-39 MIN: ICD-10-PCS | Mod: S$GLB,,, | Performed by: NURSE PRACTITIONER

## 2021-06-24 PROCEDURE — 99214 OFFICE O/P EST MOD 30 MIN: CPT | Mod: S$GLB,,, | Performed by: NURSE PRACTITIONER

## 2021-06-24 RX ORDER — METFORMIN HYDROCHLORIDE 500 MG/1
500 TABLET ORAL
COMMUNITY

## 2021-06-24 RX ORDER — ATORVASTATIN CALCIUM 80 MG/1
40 TABLET, FILM COATED ORAL NIGHTLY
Qty: 90 TABLET | Refills: 3 | Status: SHIPPED | OUTPATIENT
Start: 2021-06-24 | End: 2022-07-06

## 2021-06-29 ENCOUNTER — HOSPITAL ENCOUNTER (OUTPATIENT)
Dept: RADIOLOGY | Facility: HOSPITAL | Age: 61
Discharge: HOME | End: 2021-06-29
Attending: NURSE PRACTITIONER
Payer: MEDICAID

## 2021-06-29 DIAGNOSIS — K76.0 FATTY (CHANGE OF) LIVER, NOT ELSEWHERE CLASSIFIED: ICD-10-CM

## 2021-06-29 PROCEDURE — 76700 US EXAM ABDOM COMPLETE: CPT | Mod: TC,PO

## 2021-07-01 DIAGNOSIS — E11.9 TYPE II DIABETES MELLITUS: Primary | ICD-10-CM

## 2021-12-27 ENCOUNTER — LAB VISIT (OUTPATIENT)
Dept: PRIMARY CARE CLINIC | Facility: OTHER | Age: 61
End: 2021-12-27
Attending: INTERNAL MEDICINE
Payer: MEDICAID

## 2021-12-27 DIAGNOSIS — Z20.822 ENCOUNTER FOR LABORATORY TESTING FOR COVID-19 VIRUS: ICD-10-CM

## 2021-12-27 PROCEDURE — U0003 INFECTIOUS AGENT DETECTION BY NUCLEIC ACID (DNA OR RNA); SEVERE ACUTE RESPIRATORY SYNDROME CORONAVIRUS 2 (SARS-COV-2) (CORONAVIRUS DISEASE [COVID-19]), AMPLIFIED PROBE TECHNIQUE, MAKING USE OF HIGH THROUGHPUT TECHNOLOGIES AS DESCRIBED BY CMS-2020-01-R: HCPCS | Performed by: INTERNAL MEDICINE

## 2021-12-29 DIAGNOSIS — U07.1 COVID-19: Primary | ICD-10-CM

## 2021-12-29 DIAGNOSIS — U07.1 COVID-19 VIRUS DETECTED: ICD-10-CM

## 2021-12-29 LAB
SARS-COV-2 RNA RESP QL NAA+PROBE: DETECTED
SARS-COV-2- CYCLE NUMBER: 11

## 2021-12-31 ENCOUNTER — INFUSION (OUTPATIENT)
Dept: INFECTIOUS DISEASES | Facility: HOSPITAL | Age: 61
End: 2021-12-31
Attending: STUDENT IN AN ORGANIZED HEALTH CARE EDUCATION/TRAINING PROGRAM
Payer: MEDICAID

## 2021-12-31 VITALS
RESPIRATION RATE: 16 BRPM | TEMPERATURE: 98 F | HEART RATE: 65 BPM | SYSTOLIC BLOOD PRESSURE: 157 MMHG | OXYGEN SATURATION: 99 % | DIASTOLIC BLOOD PRESSURE: 74 MMHG

## 2021-12-31 DIAGNOSIS — U07.1 COVID-19: Primary | ICD-10-CM

## 2021-12-31 PROCEDURE — 25000003 PHARM REV CODE 250: Performed by: STUDENT IN AN ORGANIZED HEALTH CARE EDUCATION/TRAINING PROGRAM

## 2021-12-31 PROCEDURE — 63600175 PHARM REV CODE 636 W HCPCS: Performed by: STUDENT IN AN ORGANIZED HEALTH CARE EDUCATION/TRAINING PROGRAM

## 2021-12-31 PROCEDURE — M0243 CASIRIVI AND IMDEVI INFUSION: HCPCS | Performed by: STUDENT IN AN ORGANIZED HEALTH CARE EDUCATION/TRAINING PROGRAM

## 2021-12-31 RX ORDER — ONDANSETRON 4 MG/1
4 TABLET, ORALLY DISINTEGRATING ORAL ONCE AS NEEDED
Status: DISCONTINUED | OUTPATIENT
Start: 2021-12-31 | End: 2022-02-07

## 2021-12-31 RX ORDER — ACETAMINOPHEN 325 MG/1
650 TABLET ORAL ONCE AS NEEDED
Status: ACTIVE | OUTPATIENT
Start: 2021-12-31 | End: 2033-05-29

## 2021-12-31 RX ORDER — DIPHENHYDRAMINE HYDROCHLORIDE 50 MG/ML
25 INJECTION INTRAMUSCULAR; INTRAVENOUS ONCE AS NEEDED
Status: DISCONTINUED | OUTPATIENT
Start: 2021-12-31 | End: 2022-02-07

## 2021-12-31 RX ORDER — ALBUTEROL SULFATE 90 UG/1
2 AEROSOL, METERED RESPIRATORY (INHALATION)
Status: ACTIVE | OUTPATIENT
Start: 2021-12-31

## 2021-12-31 RX ORDER — SODIUM CHLORIDE 0.9 % (FLUSH) 0.9 %
10 SYRINGE (ML) INJECTION
Status: DISCONTINUED | OUTPATIENT
Start: 2021-12-31 | End: 2022-02-07

## 2021-12-31 RX ORDER — EPINEPHRINE 0.3 MG/.3ML
0.3 INJECTION SUBCUTANEOUS
Status: DISCONTINUED | OUTPATIENT
Start: 2021-12-31 | End: 2022-02-07

## 2021-12-31 RX ADMIN — CASIRIVIMAB AND IMDEVIMAB 600 MG: 600; 600 INJECTION, SOLUTION, CONCENTRATE INTRAVENOUS at 02:12

## 2021-12-31 RX ADMIN — SODIUM CHLORIDE: 0.9 INJECTION, SOLUTION INTRAVENOUS at 02:12

## 2022-01-06 ENCOUNTER — TELEPHONE (OUTPATIENT)
Dept: CARDIOLOGY | Facility: CLINIC | Age: 62
End: 2022-01-06
Payer: MEDICAID

## 2022-01-10 ENCOUNTER — NURSE TRIAGE (OUTPATIENT)
Dept: ADMINISTRATIVE | Facility: CLINIC | Age: 62
End: 2022-01-10
Payer: MEDICAID

## 2022-01-10 NOTE — TELEPHONE ENCOUNTER
C/O lower back pain. PMH of lower back disc issues.  Reason for Disposition   [1] COVID-19 diagnosed by positive lab test AND [2] mild symptoms (e.g., cough, fever, others) AND [3] no complications or SOB    Additional Information   Negative: SEVERE difficulty breathing (e.g., struggling for each breath, speaks in single words)   Negative: Difficult to awaken or acting confused (e.g., disoriented, slurred speech)   Negative: Bluish (or gray) lips or face now   Negative: Shock suspected (e.g., cold/pale/clammy skin, too weak to stand, low BP, rapid pulse)   Negative: Sounds like a life-threatening emergency to the triager   Negative: [1] COVID-19 exposure AND [2] NO symptoms   Negative: COVID-19 vaccine reaction suspected (e.g., fever, headache, muscle aches) occurring 1 to 3 days after getting vaccine   Negative: COVID-19 vaccine, questions about   Negative: [1] Lives with someone known to have influenza (flu test positive) AND [2] flu-like symptoms (e.g., cough, runny nose, sore throat, SOB; with or without fever)   Negative: [1] Adult with possible COVID-19 symptoms AND [2] triager concerned about severity of symptoms or other causes   Negative: COVID-19 and breastfeeding, questions about   Negative: SEVERE or constant chest pain or pressure (Exception: mild central chest pain, present only when coughing)   Negative: MODERATE difficulty breathing (e.g., speaks in phrases, SOB even at rest, pulse 100-120)   Negative: Headache and stiff neck (can't touch chin to chest)   Negative: MILD difficulty breathing (e.g., minimal/no SOB at rest, SOB with walking, pulse <100)   Negative: Chest pain or pressure   Negative: Patient sounds very sick or weak to the triager   Negative: Fever > 103 F (39.4 C)   Negative: [1] Fever > 101 F (38.3 C) AND [2] over 60 years of age   Negative: [1] Fever > 100.0 F (37.8 C) AND [2] bedridden (e.g., nursing home patient, CVA, chronic illness, recovering from  surgery)   Negative: HIGH RISK for severe COVID complications (e.g., age > 64 years, obesity with BMI > 25, pregnant, chronic lung disease or other chronic medical condition) (Exception: Already seen by PCP and no new or worsening symptoms.)   Negative: [1] HIGH RISK patient AND [2] influenza is widespread in the community AND [3] ONE OR MORE respiratory symptoms: cough, sore throat, runny or stuffy nose   Negative: [1] HIGH RISK patient AND [2] influenza exposure within the last 7 days AND [3] ONE OR MORE respiratory symptoms: cough, sore throat, runny or stuffy nose   Negative: [1] COVID-19 infection suspected by caller or triager AND [2] mild symptoms (cough, fever, or others) AND [3] negative COVID-19 rapid test   Negative: Fever present > 3 days (72 hours)   Negative: [1] Fever returns after gone for over 24 hours AND [2] symptoms worse or not improved   Negative: [1] Continuous (nonstop) coughing interferes with work or school AND [2] no improvement using cough treatment per protocol   Negative: Cough present > 3 weeks   Negative: [1] COVID-19 diagnosed by positive lab test AND [2] NO symptoms (e.g., cough, fever, others)    Protocols used: CORONAVIRUS (COVID-19) DIAGNOSED OR [id number]

## 2022-02-07 ENCOUNTER — OFFICE VISIT (OUTPATIENT)
Dept: CARDIOLOGY | Facility: CLINIC | Age: 62
End: 2022-02-07
Payer: MEDICAID

## 2022-02-07 VITALS
SYSTOLIC BLOOD PRESSURE: 132 MMHG | RESPIRATION RATE: 16 BRPM | BODY MASS INDEX: 27.58 KG/M2 | WEIGHT: 182 LBS | DIASTOLIC BLOOD PRESSURE: 82 MMHG | OXYGEN SATURATION: 98 % | HEART RATE: 91 BPM | HEIGHT: 68 IN

## 2022-02-07 DIAGNOSIS — I10 ESSENTIAL HYPERTENSION: ICD-10-CM

## 2022-02-07 DIAGNOSIS — E78.5 DYSLIPIDEMIA: ICD-10-CM

## 2022-02-07 DIAGNOSIS — I74.10 THROMBUS OF AORTA: ICD-10-CM

## 2022-02-07 DIAGNOSIS — R94.39 ABNORMAL STRESS TEST: ICD-10-CM

## 2022-02-07 DIAGNOSIS — I25.118 CORONARY ARTERY DISEASE OF NATIVE ARTERY OF NATIVE HEART WITH STABLE ANGINA PECTORIS: Primary | ICD-10-CM

## 2022-02-07 DIAGNOSIS — Z95.1 S/P CABG X 5: ICD-10-CM

## 2022-02-07 PROCEDURE — 1160F RVW MEDS BY RX/DR IN RCRD: CPT | Mod: CPTII,S$GLB,, | Performed by: NURSE PRACTITIONER

## 2022-02-07 PROCEDURE — 3075F PR MOST RECENT SYSTOLIC BLOOD PRESS GE 130-139MM HG: ICD-10-PCS | Mod: CPTII,S$GLB,, | Performed by: NURSE PRACTITIONER

## 2022-02-07 PROCEDURE — 1159F MED LIST DOCD IN RCRD: CPT | Mod: CPTII,S$GLB,, | Performed by: NURSE PRACTITIONER

## 2022-02-07 PROCEDURE — 3008F BODY MASS INDEX DOCD: CPT | Mod: CPTII,S$GLB,, | Performed by: NURSE PRACTITIONER

## 2022-02-07 PROCEDURE — 3008F PR BODY MASS INDEX (BMI) DOCUMENTED: ICD-10-PCS | Mod: CPTII,S$GLB,, | Performed by: NURSE PRACTITIONER

## 2022-02-07 PROCEDURE — 1160F PR REVIEW ALL MEDS BY PRESCRIBER/CLIN PHARMACIST DOCUMENTED: ICD-10-PCS | Mod: CPTII,S$GLB,, | Performed by: NURSE PRACTITIONER

## 2022-02-07 PROCEDURE — 99214 OFFICE O/P EST MOD 30 MIN: CPT | Mod: S$GLB,,, | Performed by: NURSE PRACTITIONER

## 2022-02-07 PROCEDURE — 3075F SYST BP GE 130 - 139MM HG: CPT | Mod: CPTII,S$GLB,, | Performed by: NURSE PRACTITIONER

## 2022-02-07 PROCEDURE — 93000 ELECTROCARDIOGRAM COMPLETE: CPT | Mod: S$GLB,,, | Performed by: INTERNAL MEDICINE

## 2022-02-07 PROCEDURE — 99214 PR OFFICE/OUTPT VISIT, EST, LEVL IV, 30-39 MIN: ICD-10-PCS | Mod: S$GLB,,, | Performed by: NURSE PRACTITIONER

## 2022-02-07 PROCEDURE — 3079F PR MOST RECENT DIASTOLIC BLOOD PRESSURE 80-89 MM HG: ICD-10-PCS | Mod: CPTII,S$GLB,, | Performed by: NURSE PRACTITIONER

## 2022-02-07 PROCEDURE — 3079F DIAST BP 80-89 MM HG: CPT | Mod: CPTII,S$GLB,, | Performed by: NURSE PRACTITIONER

## 2022-02-07 PROCEDURE — 93000 EKG 12-LEAD: ICD-10-PCS | Mod: S$GLB,,, | Performed by: INTERNAL MEDICINE

## 2022-02-07 PROCEDURE — 1159F PR MEDICATION LIST DOCUMENTED IN MEDICAL RECORD: ICD-10-PCS | Mod: CPTII,S$GLB,, | Performed by: NURSE PRACTITIONER

## 2022-02-07 RX ORDER — NAPROXEN SODIUM 220 MG/1
81 TABLET, FILM COATED ORAL DAILY
Qty: 90 TABLET | Refills: 3 | Status: SHIPPED | OUTPATIENT
Start: 2022-02-07 | End: 2023-10-16

## 2022-02-07 NOTE — PROGRESS NOTES
Subjective:    Patient ID:  Louie Wahl is a 61 y.o. male   Chief Complaint   Patient presents with    Hypertension       HPI:  Patient presents today for follow-up appointment.  Patient denies any chest pain, dizziness, shortness of breath, palpitations, or syncope.  Patient has been doing well and taking medications as ordered.  Denies any falls or head injuries.  Denies any blood in the stool or in the urine.      Review of patient's allergies indicates:   Allergen Reactions    Ace inhibitors Hives and Swelling       Past Medical History:   Diagnosis Date    Anticoagulant long-term use     Atrial flutter     Coronary artery disease     Hyperlipidemia 2009    Hypertension      Past Surgical History:   Procedure Laterality Date    CORONARY ARTERY BYPASS GRAFT (CABG) N/A 10/6/2020    Procedure: CORONARY ARTERY BYPASS GRAFT (CABG);  Surgeon: Long Garvin MD;  Location: [facility] OR;  Service: Cardiovascular;  Laterality: N/A;    ENDOSCOPIC HARVEST OF VEIN Left 10/6/2020    Procedure: SURGICAL PROCUREMENT, VEIN, ENDOSCOPIC;  Surgeon: Long Garvin MD;  Location: [facility] OR;  Service: Cardiovascular;  Laterality: Left;    INCISION AND DRAINAGE Right 1964    knee    LEFT HEART CATHETERIZATION Left 2020    Procedure: Left heart cath;  Surgeon: Angela Dickens MD;  Location: [facility] CATH/EP LAB;  Service: Cardiology;  Laterality: Left;    MANDIBLE FRACTURE SURGERY Left     wires in jaw    TRANSESOPHAGEAL ECHOCARDIOGRAPHY      x3     Social History     Tobacco Use    Smoking status: Former Smoker     Packs/day: 0.50     Years: 45.00     Pack years: 22.50     Quit date: 2020     Years since quittin.4    Smokeless tobacco: Never Used    Tobacco comment: 1 pack per month   Substance Use Topics    Alcohol use: Not Currently     Comment: Social    Drug use: Not Currently     Types: Marijuana     Family History   Problem Relation Age of Onset    Heart failure Mother     Heart  disease Mother     Heart disease Father     Heart failure Father         Review of Systems:   Constitution: Negative for diaphoresis and fever.   HEENT: Negative for nosebleeds.    Cardiovascular: Negative for chest pain       No dyspnea on exertion       No leg swelling        No palpitations  Respiratory: Negative for shortness of breath and wheezing.    Hematologic/Lymphatic: Negative for bleeding problem. Does not bruise/bleed easily.   Skin: Negative for color change and rash.   Musculoskeletal: Negative for falls and myalgias.   Gastrointestinal: Negative for hematemesis and hematochezia.   Genitourinary: Negative for hematuria.   Neurological: Negative for dizziness and light-headedness.   Psychiatric/Behavioral: Negative for altered mental status and memory loss. + anxiety         Objective:        Vitals:    02/07/22 1427   BP: 132/82   Pulse: 91   Resp: 16       Lab Results   Component Value Date    WBC 7.98 06/22/2021    HGB 15.3 06/22/2021    HCT 45.2 06/22/2021     06/22/2021    CHOL 136 06/22/2021    TRIG 164 (H) 06/22/2021    HDL 26 (L) 06/22/2021    ALT 80 (H) 06/22/2021    AST 74 (H) 06/22/2021     (L) 06/22/2021    K 4.2 06/22/2021    CL 99 06/22/2021    CREATININE 1.0 06/22/2021    BUN 18 06/22/2021    CO2 28 06/22/2021    INR 1.5 10/06/2020        ECHOCARDIOGRAM RESULTS  Results for orders placed during the hospital encounter of 09/28/20    Transesophageal echo (DIONISIO)    Interpretation Summary  Indication:  Known mobile distal aortic arch plaque with mobile thrombus.    Counseling:  Patient as well as his family member were informed of the risks, benefits as well as alternatives to the procedure and informed consent was obtained.    Procedure:  After obtaining informed consent the patient was sedated with the help of Anesthesia team. A DIONISIO probe was advanced without difficulty and images were obtained. Patient tolerated the procedure well and no immediate complications were noted.  Patient was transferred out of the cardiac cath lab in stable medical condition.    Complications:  None    Findings:  1. Aortic valve: Noncoronary cusp of the aortic valve appears mildly thick and calcified with adequate leaflet motion. No significant regurgitation.  2. Mitral valve is structurally normal with good leaflet excursion. Trace regurgitation.  3. Tricuspid valve is structurally normal with good leaflet excursion. No significant regurgitation.  4. Pulmonary valve is structurally normal with good leaflet excursion. Trace regurgitation.  5. Overall LVEF appears normal.  6. Severe atherosclerotic plaque visualized in the visualized portions of the distal arch and descending thoracic aorta. No obvious mobile component noted.  7. Lipomatous hypertrophy of the interatrial septum.  8. Trace pericardial effusion.    Conclusions:  1. Severe atherosclerotic plaque noted in the visualized portions of the distal arch and descending thoracic aorta. No obvious mobile component noted. Essentially unchanged from previous study.  2. Trace pericardial effusion.        CURRENT/PREVIOUS VISIT EKG  Results for orders placed or performed in visit on 03/23/21   IN OFFICE EKG 12-LEAD (to [place])    Collection Time: 03/23/21  2:50 PM    Narrative    Test Reason : I10,    Vent. Rate : 068 BPM     Atrial Rate : 068 BPM     P-R Int : 176 ms          QRS Dur : 142 ms      QT Int : 400 ms       P-R-T Axes : 040 044 042 degrees     QTc Int : 425 ms    Normal sinus rhythm  Right bundle branch block  Abnormal ECG  When compared with ECG of 09-OCT-2020 04:12,  Criteria for Septal infarct are no longer Present  Confirmed by Simeon Bashir MD ([id number]) on 4/14/2021 9:02:57 AM    Referred By:  VB           Confirmed By:Simeon Bashir MD     No valid procedures specified.   No results found for this or any previous visit.      Physical Exam:  CONSTITUTIONAL: No fever, no chills  HEENT: Normocephalic, atraumatic,pupils reactive to light                  NECK:  No JVD no carotid bruit  CVS: S1S2+, RRR  LUNGS: Clear  ABDOMEN: Soft, NT, BS+  EXTREMITIES: No cyanosis, edema  : No morillo catheter  NEURO: AAO X 3  PSY: Normal affect      Medication List with Changes/Refills   New Medications    ASPIRIN 81 MG CHEW    Take 1 tablet (81 mg total) by mouth once daily.   Current Medications    AMLODIPINE (NORVASC) 5 MG TABLET    Take 1 tablet (5 mg total) by mouth once daily.    ATORVASTATIN (LIPITOR) 80 MG TABLET    Take 0.5 tablets (40 mg total) by mouth every evening.    CLOPIDOGREL (PLAVIX) 75 MG TABLET    Take 1 tablet (75 mg total) by mouth once daily.    METFORMIN (GLUCOPHAGE) 500 MG TABLET    Take 500 mg by mouth daily with breakfast.    METOPROLOL SUCCINATE (TOPROL-XL) 50 MG 24 HR TABLET    Take 1 tablet (50 mg total) by mouth 2 (two) times daily.    OMEGA-3 FATTY ACIDS/FISH OIL (FISH OIL-OMEGA-3 FATTY ACIDS) 300-1,000 MG CAPSULE    Take 1 capsule by mouth 2 (two) times a day.   Discontinued Medications    ASPIRIN 325 MG TABLET    Take 1 tablet (325 mg total) by mouth once daily.             Assessment:       1. Coronary artery disease of native artery of native heart with stable angina pectoris    2. S/P CABG x 5    3. Dyslipidemia    4. Essential hypertension    5. Abnormal stress test    6. History of aortic arch thrombus         Plan:     1. EKG shows sinus rhythm with  2. Continue current regimen with Plavix 75 mg p.o. daily, but we will decrease his aspirin to 81 mg p.o. daily.  No reports of bleeding or head injuries.  3. Continue current medications with amlodipine and metoprolol succinate.  4. Recommend low-fat low-cholesterol diet and regular exercise.  5. Will see him back for follow-up appointment in about 6 months.  May call or return sooner problems arise.    Problem List Items Addressed This Visit        Unprioritized    History of aortic arch thrombus    Overview     Distal Aortic arch thrombus. Likely on a plaque. Diagnosed by DIONISIO. Very  mobile.  Resolved on repeat transesophageal echocardiogram with no mobile component noted on 3/17/2020.          Coronary artery disease of native artery of native heart with stable angina pectoris - Primary    Overview     S/p CABG X 5 on 10/6/2020 by Dr Garvin(SVG to LAD, sequential SVG to rPDA/inferior ventricular branch, natural Y sequential SVG to Ramus and distal LCx).          Essential hypertension    Dyslipidemia    Abnormal stress test    S/P CABG x 5          No follow-ups on file.

## 2022-02-10 ENCOUNTER — TELEPHONE (OUTPATIENT)
Dept: CARDIOLOGY | Facility: CLINIC | Age: 62
End: 2022-02-10
Payer: MEDICAID

## 2022-02-10 NOTE — TELEPHONE ENCOUNTER
----- Message from Doug Garsia sent at 2/10/2022 12:37 PM CST -----  Type:  Needs Medical Advice    Who Called: pt   Symptoms (please be specific):   How long has patient had these symptoms:   Pharmacy name and phone #:    Would the patient rather a call back or a response via [facility]? Call back   Best Call Back Number:   Additional Information: requesting call back to review medication list, believes there is an error

## 2022-02-10 NOTE — TELEPHONE ENCOUNTER
S/w patient and he stated that he was not at home and that he doesn't remember so he will call back when he gets home.

## 2022-02-21 NOTE — PLAN OF CARE
09/30/20 1036   Discharge Assessment   Assessment Type Discharge Planning Reassessment     Mirna pt's Nurse called this CM asking about Lovenox 60 mg (once a day or BID for about 5-10 days) for pt to go home with, no order yet from Dr Garvin but pt going home on Lovenox till ready for CABG, about 5-10 days.  Called and spoke with Mr Hernandez in [facility] he said should be no [place].  Called Mrs Bradley back and updated.   Phone number states that the number is no longer in service.    Please make sure the phone number is correct

## 2022-03-12 NOTE — H&P (VIEW-ONLY)
Subjective:    Patient ID:  Louie Wahl is a 60 y.o. 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  Since last clinic visit he denies any hospitalizations or emergency room visits.  He has a reportedly having intermittent chest discomfort at rest that lasts 10-20 seconds and spontaneously resolves.  He also has angina symptoms with chest pain whenever he starts doing stuff like a weed eating.  He denies any shortness of breath with usual activities.  He denies any lower extremity edema.  He denies any symptoms suggestive of stroke or TIA.  He denies any bleeding issues.  He is not allergic to the IV dye.     Social History     Socioeconomic History    Marital status: Single     Spouse name: Not on file    Number of children: Not on file    Years of education: Not on file    Highest education level: Not on file   Occupational History    Not on file   Social Needs    Financial resource strain: Not on file    Food insecurity     Worry: Not on file     Inability: Not on file    Transportation needs     Medical: Not on file     Non-medical: Not on file   Tobacco Use    Smoking status: Never Smoker    Smokeless tobacco: Never Used   Substance and Sexual Activity    Alcohol use: Not Currently     Comment: Social    Drug use: Never    Sexual activity: Not Currently   Lifestyle    Physical activity     Days per week: Not on file     Minutes per session: Not on file    Stress: Not on file   Relationships    Social connections     Talks on phone: Not on file     Gets together: Not on file     Attends Voodoo service: Not on file     Active member of club or organization: Not on file     Attends meetings of clubs or organizations: Not on file     Relationship status: Not on file   Other Topics Concern    Not on file   Social History Narrative    Not on file        Family History  "  Problem Relation Age of Onset    Heart failure Mother     Heart disease Mother     Heart disease Father     Heart failure Father           Current Outpatient Medications   Medication Instructions    aspirin 81 mg, Oral, Daily    atorvastatin (LIPITOR) 80 mg, Oral, Daily    clopidogreL (PLAVIX) 75 mg, Oral, Daily    ELIQUIS 5 mg Tab TAKE 1 TABLET BY MOUTH TWICE A DAY    isosorbide mononitrate (IMDUR) 30 MG 24 hr tablet TAKE 1 TABLET BY MOUTH EVERY DAY    metoprolol succinate (TOPROL-XL) 50 mg, Oral, Daily    multivit-minerals/folic acid (SPECTRAVITE ADULT ORAL) Oral    omega-3 acid ethyl esters (LOVAZA) 2 g, Oral, Daily    pantoprazole (PROTONIX) 40 mg, Oral, Daily        Review of Systems   Constitution: Positive for malaise/fatigue. Negative for decreased appetite, fever, weight gain and weight loss.   HENT: Negative for ear pain and sore throat.    Eyes: Negative for double vision and pain.   Cardiovascular: Positive for chest pain. Negative for irregular heartbeat.   Respiratory: Negative for cough and hemoptysis.    Endocrine: Negative for heat intolerance and polydipsia.   Hematologic/Lymphatic: Negative for bleeding problem.   Skin: Negative for rash.   Musculoskeletal: Negative for stiffness.   Gastrointestinal: Negative for abdominal pain, jaundice and vomiting.   Genitourinary: Negative for dysuria.   Neurological: Negative for seizures and tremors.   Psychiatric/Behavioral: Negative for altered mental status, hallucinations and suicidal ideas.        Objective:     Vitals:    09/22/20 1007   BP: (!) 142/80   BP Location: Left arm   Patient Position: Sitting   BP Method: Medium (Manual)   Pulse: 68   Resp: 16   SpO2: 98%   Weight: 83 kg (183 lb)   Height: 5' 8" (1.727 m)       Physical Exam   Constitutional: He is oriented to person, place, and time. He appears well-developed and well-nourished.   HENT:   Head: Normocephalic and atraumatic.   Eyes: Pupils are equal, round, and reactive to " light. Conjunctivae are normal.   Neck: Neck supple. No JVD present.   Cardiovascular: Normal rate, regular rhythm, S1 normal, S2 normal and normal heart sounds. Exam reveals no gallop and no friction rub.   No murmur heard.  Pulses:       Carotid pulses are 2+ on the right side and 2+ on the left side.       Posterior tibial pulses are 2+ on the right side and 2+ on the left side.   Pulmonary/Chest: No stridor. No respiratory distress. He has no wheezes. He has no rales.   Abdominal: Soft. He exhibits no distension. There is no abdominal tenderness.   Musculoskeletal:         General: No edema.   Neurological: He is alert and oriented to person, place, and time.   Skin: Skin is warm and dry. No burn and no rash noted. No cyanosis. Nails show no clubbing.   Psychiatric: His behavior is normal. He expresses no suicidal ideation.     No results found for this or any previous visit.  Results for orders placed during the hospital encounter of 03/17/20   Intra-Procedure Documentation    Narrative DIONISIO performed in the Invasive Lab    - See Procedure Log link below for nursing documentation    - See DIONISIO order on Card Proc Tab for physician findings           Assessment:       Problem List Items Addressed This Visit        Cardiac/Vascular    Coronary artery disease of native artery of native heart with stable angina pectoris    Overview     Reportedly had three-vessel disease on a prior angiogram done a couple of years ago.  Currently with angina. Lexiscan positive for ST segment changes with chest pains. Lasted for 30 minutes         Current Assessment & Plan     Currently with chest pain at times with rest.     In view of the ongoing symptoms, sometimes chest pain at rest, I recommended left heart catheterization.  At this time he is agreeable.  I explained the indications, risks, benefits as well as alternatives to the procedure in layman terms.  Patient states he understands and wishes to proceed with the procedure.   We will attempt via the right radial access.  Was advised to hold Eliquis 3 days prior to procedure date.          Essential hypertension    Current Assessment & Plan       Continue current management.          Dyslipidemia    Current Assessment & Plan     Repeat lipid profile in 3 months.          Abnormal stress test - Primary    Current Assessment & Plan     Currently with chest pain.     Schedule [facility].          RESOLVED: Abdominal aortic aneurysm (AAA) without rupture       Hematology    Thrombus of aorta    Overview     Distal Aortic arch thrombus. Likely on a plaque. Diagnosed by DIONISIO. Very mobile.  Resolved on repeat transesophageal echocardiogram with no mobile component noted on 3/17/2020.                  Plan:       Return to clinic in 2-3 weeks or sooner if needed.              This patient has been assessed with a concern for Malnutrition and was treated during this hospitalization for the following Nutrition diagnosis/diagnoses:     -  03/11/2022: Underweight (BMI < 19)

## 2022-03-14 ENCOUNTER — TELEPHONE (OUTPATIENT)
Dept: CARDIOLOGY | Facility: CLINIC | Age: 62
End: 2022-03-14
Payer: MEDICAID

## 2022-03-14 NOTE — TELEPHONE ENCOUNTER
----- Message from Jono Mcintyre sent at 3/14/2022  2:30 PM CDT -----  Regarding: test results  Contact: Patient  Type:  Test Results    Who Called: patient   Name of Test (Lab/Mammo/Etc):  Office test results  Date of Test:  February 7th  Ordering Provider:  Esther Salguero  Where the test was performed:  [facility] Call Back Number:  [phone] (home) [phone] (work)

## 2022-08-08 ENCOUNTER — TELEPHONE (OUTPATIENT)
Dept: CARDIOLOGY | Facility: CLINIC | Age: 62
End: 2022-08-08
Payer: MEDICAID

## 2022-08-08 NOTE — TELEPHONE ENCOUNTER
----- Message from Felix Avila sent at 8/8/2022 12:54 PM CDT -----  Type:  Reschedule Appointment Request      Name of Caller:  Pt  When is the first available appointment?   Symptoms:  6 month check up  Best Call Back Number:  [phone] (    Additional Information:  Sts he can't make it in until 320 today so will need to reschedule.  Please advise == Thank you

## 2022-09-01 ENCOUNTER — TELEPHONE (OUTPATIENT)
Dept: CARDIOLOGY | Facility: CLINIC | Age: 62
End: 2022-09-01
Payer: MEDICAID

## 2022-09-01 NOTE — LETTER
2022    Louie Wahl  [address] [address]  [place] [address]             [facility] - Cardiology  [address], [address]  [facility] [address]  Phone: [phone]  Fax: [phone] Patient: Louie Wahl  : 1960  Referring Doctor:[place] [facility] provider.    Telephone:[phone]  Fax:[phone]    Procedure:C7, T1 AVANI and Right L5, S1 Transforaminal AVANI  Type of Anesthesia:General Sedation with Propofol    Date of Last Stent:  Date of Last Office Visit:2022    Current Outpatient Medications   Medication Sig    amLODIPine (NORVASC) 5 MG tablet Take 1 tablet (5 mg total) by mouth once daily.    aspirin 81 MG Chew Take 1 tablet (81 mg total) by mouth once daily.    atorvastatin (LIPITOR) 80 MG tablet TAKE ONE-HALF TABLET (40MG) BY MOUTH EVERY EVENING    clopidogreL (PLAVIX) 75 mg tablet TAKE ONE TABLET (75MG) BY MOUTH ONCE DAILY    metFORMIN (GLUCOPHAGE) 500 MG tablet Take 500 mg by mouth daily with breakfast.    metoprolol succinate (TOPROL-XL) 50 MG 24 hr tablet Take 1 tablet (50 mg total) by mouth 2 (two) times daily.    omega-3 fatty acids/fish oil (FISH OIL-OMEGA-3 FATTY ACIDS) 300-1,000 mg capsule Take 1 capsule by mouth 2 (two) times a day.     Current Facility-Administered Medications   Medication    acetaminophen tablet 650 mg    albuterol inhaler 2 puff    methylPREDNISolone sodium succinate injection 40 mg           This patient has been assessed for risk factors for clearance of surgery with the following stipulations:      ___ No contraindications    X    Recommendations for antiplatelet/anticoagulant medications:Patient may hold  Plavix and Aspirin for 7 days prior to surgery.      X    Cleared for surgery with the following contraindications/precautions: moderate risks.    ___ Not cleared for surgery due to the following reasons:      If you have any questions regarding the above, please contact my office at [phone].    Sincerely,        Esther  ALFONSO Salguero-C  Department of Cardiology   [facility]- [place], [place]

## 2022-09-01 NOTE — TELEPHONE ENCOUNTER
Patient scheduled for Interventional Pain procedure (C7, T1 AVANI and Right L5, S1 Transforaminal AVANI)  with [facility] provider.    Patient will need to hold Plavix and Aspirin.    Please sign letter to ne faxed.

## 2022-09-22 ENCOUNTER — TELEPHONE (OUTPATIENT)
Dept: CARDIOLOGY | Facility: CLINIC | Age: 62
End: 2022-09-22

## 2022-09-22 NOTE — TELEPHONE ENCOUNTER
Patient was called and informed NP had family emergency and will not be in clinic so appt needs r/s,. Pt said he would callback when his wife gets home from [facility] to determine what will be better.

## 2023-01-18 RX ORDER — AMLODIPINE BESYLATE 5 MG/1
5 TABLET ORAL DAILY
Qty: 90 TABLET | Refills: 3 | Status: SHIPPED | OUTPATIENT
Start: 2023-01-18 | End: 2024-01-18

## 2023-01-18 NOTE — TELEPHONE ENCOUNTER
----- Message from Alfonso Delaney sent at 1/18/2023 12:30 PM CST -----  Type:  RX Refill Request    Who Called:  pt  Refill or New Rx:  refill  RX Name and Strength:  amLODIPine (NORVASC) 5 MG tablet  How is the patient currently taking it? (ex. 1XDay):  as directed  Is this a 30 day or 90 day RX:  90  Preferred Pharmacy with phone number:    [place] [facility] - [place] - [address]  [address]  [place] [address]  Phone: [phone] Fax: [phone]      Local or Mail Order:  local  Ordering Provider:  Noemy Winters Call Back Number:  [phone] (home) [phone] (work)    Additional Information: said he took his last one this morning--  please call and advise--thank you

## 2023-01-27 ENCOUNTER — HOSPITAL ENCOUNTER (EMERGENCY)
Facility: HOSPITAL | Age: 63
Discharge: HOME | End: 2023-01-27
Attending: EMERGENCY MEDICINE
Payer: MEDICAID

## 2023-01-27 ENCOUNTER — OFFICE VISIT (OUTPATIENT)
Dept: URGENT CARE | Facility: CLINIC | Age: 63
End: 2023-01-27
Payer: MEDICAID

## 2023-01-27 VITALS
BODY MASS INDEX: 26.7 KG/M2 | TEMPERATURE: 98 F | HEIGHT: 68 IN | HEART RATE: 83 BPM | OXYGEN SATURATION: 96 % | RESPIRATION RATE: 20 BRPM | DIASTOLIC BLOOD PRESSURE: 75 MMHG | SYSTOLIC BLOOD PRESSURE: 136 MMHG | WEIGHT: 176.19 LBS

## 2023-01-27 VITALS
SYSTOLIC BLOOD PRESSURE: 132 MMHG | RESPIRATION RATE: 18 BRPM | TEMPERATURE: 97 F | BODY MASS INDEX: 26.76 KG/M2 | OXYGEN SATURATION: 98 % | HEART RATE: 80 BPM | DIASTOLIC BLOOD PRESSURE: 79 MMHG | WEIGHT: 176 LBS

## 2023-01-27 DIAGNOSIS — S01.511A COMPLICATED LACERATION OF LIP, INITIAL ENCOUNTER: ICD-10-CM

## 2023-01-27 DIAGNOSIS — S01.81XA FACIAL LACERATION, INITIAL ENCOUNTER: ICD-10-CM

## 2023-01-27 DIAGNOSIS — S09.93XA FACIAL INJURY, INITIAL ENCOUNTER: Primary | ICD-10-CM

## 2023-01-27 DIAGNOSIS — S02.40DA CLOSED FRACTURE OF LEFT SIDE OF MAXILLA, INITIAL ENCOUNTER: Primary | ICD-10-CM

## 2023-01-27 PROCEDURE — 3078F DIAST BP <80 MM HG: CPT | Mod: CPTII,S$GLB,, | Performed by: NURSE PRACTITIONER

## 2023-01-27 PROCEDURE — 12013 RPR F/E/E/N/L/M 2.6-5.0 CM: CPT | Mod: PBBFAC,59

## 2023-01-27 PROCEDURE — 1159F PR MEDICATION LIST DOCUMENTED IN MEDICAL RECORD: ICD-10-PCS | Mod: CPTII,S$GLB,, | Performed by: NURSE PRACTITIONER

## 2023-01-27 PROCEDURE — 1160F PR REVIEW ALL MEDS BY PRESCRIBER/CLIN PHARMACIST DOCUMENTED: ICD-10-PCS | Mod: CPTII,S$GLB,, | Performed by: NURSE PRACTITIONER

## 2023-01-27 PROCEDURE — 99213 PR OFFICE/OUTPT VISIT, EST, LEVL III, 20-29 MIN: ICD-10-PCS | Mod: S$GLB,,, | Performed by: NURSE PRACTITIONER

## 2023-01-27 PROCEDURE — 99213 OFFICE O/P EST LOW 20 MIN: CPT | Mod: S$GLB,,, | Performed by: NURSE PRACTITIONER

## 2023-01-27 PROCEDURE — 3078F PR MOST RECENT DIASTOLIC BLOOD PRESSURE < 80 MM HG: ICD-10-PCS | Mod: CPTII,S$GLB,, | Performed by: NURSE PRACTITIONER

## 2023-01-27 PROCEDURE — 3075F PR MOST RECENT SYSTOLIC BLOOD PRESS GE 130-139MM HG: ICD-10-PCS | Mod: CPTII,S$GLB,, | Performed by: NURSE PRACTITIONER

## 2023-01-27 PROCEDURE — 99284 EMERGENCY DEPT VISIT MOD MDM: CPT | Mod: 25

## 2023-01-27 PROCEDURE — 3008F BODY MASS INDEX DOCD: CPT | Mod: CPTII,S$GLB,, | Performed by: NURSE PRACTITIONER

## 2023-01-27 PROCEDURE — 3075F SYST BP GE 130 - 139MM HG: CPT | Mod: CPTII,S$GLB,, | Performed by: NURSE PRACTITIONER

## 2023-01-27 PROCEDURE — 25000003 PHARM REV CODE 250: Performed by: EMERGENCY MEDICINE

## 2023-01-27 PROCEDURE — 40650 RPR LIP FTH VERMILION ONLY: CPT

## 2023-01-27 PROCEDURE — 3008F PR BODY MASS INDEX (BMI) DOCUMENTED: ICD-10-PCS | Mod: CPTII,S$GLB,, | Performed by: NURSE PRACTITIONER

## 2023-01-27 PROCEDURE — 1159F MED LIST DOCD IN RCRD: CPT | Mod: CPTII,S$GLB,, | Performed by: NURSE PRACTITIONER

## 2023-01-27 PROCEDURE — 1160F RVW MEDS BY RX/DR IN RCRD: CPT | Mod: CPTII,S$GLB,, | Performed by: NURSE PRACTITIONER

## 2023-01-27 RX ORDER — AMOXICILLIN AND CLAVULANATE POTASSIUM 875; 125 MG/1; MG/1
1 TABLET, FILM COATED ORAL 2 TIMES DAILY
Qty: 14 TABLET | Refills: 0 | Status: SHIPPED | OUTPATIENT
Start: 2023-01-27

## 2023-01-27 RX ORDER — NAPROXEN 500 MG/1
500 TABLET ORAL 2 TIMES DAILY PRN
Qty: 10 TABLET | Refills: 0 | Status: SHIPPED | OUTPATIENT
Start: 2023-01-27 | End: 2023-02-01

## 2023-01-27 RX ORDER — LIDOCAINE HYDROCHLORIDE 10 MG/ML
5 INJECTION, SOLUTION EPIDURAL; INFILTRATION; INTRACAUDAL; PERINEURAL
Status: COMPLETED | OUTPATIENT
Start: 2023-01-27 | End: 2023-01-27

## 2023-01-27 RX ADMIN — LIDOCAINE HYDROCHLORIDE 50 MG: 10 INJECTION, SOLUTION EPIDURAL; INFILTRATION; INTRACAUDAL; PERINEURAL at 11:01

## 2023-01-27 NOTE — DISCHARGE INSTRUCTIONS
RETURN TO EMERGENCY DEPARTMENT WITHOUT FAIL, IF YOUR SYMPTOMS WORSEN, IF YOU GET NEW OR DIFFERENT SYMPTOMS, IF YOU ARE UNABLE TO FOLLOW UP AS DIRECTED, OR IF YOU HAVE ANY CONCERNS OR WORRIES.    Soft diet.  Follow-up with Ear Nose and Throat on an outpatient basis in 2 weeks.  Have stitches removed in 5 days.

## 2023-01-27 NOTE — PROGRESS NOTES
"Subjective:       Patient ID: Louie Wahl is a 62 y.o. male.    Vitals:  height is 5' 8" (1.727 m) and weight is 79.9 kg (176 lb 3.2 oz). His oral temperature is 97.5 °F (36.4 °C). His blood pressure is 136/75 and his pulse is 83. His respiration is 20 and oxygen saturation is 96%.     Chief Complaint: Lip Laceration    Pt states "Fell over on cymbals from his drum set, hitting his top lip."  Patient states that last night around midnight he was inebriated and fell over his drum set. The top cymbal impacted the skin above his top lip and he fell forward impacting the left side of his face. Has taken nothing for symptoms. Bleeding from laceration, swelling in the left maxillary are and under left eye. No injury to eye itself, no vision changes.  Tdap 10/2019.  Past Medical History:  No date: Anticoagulant long-term use  2019: Atrial flutter  No date: Coronary artery disease  2009: Hyperlipidemia  2009: Hypertension    Past Surgical History:  10/6/2020: CORONARY ARTERY BYPASS GRAFT (CABG); N/A      Comment:  Procedure: CORONARY ARTERY BYPASS GRAFT (CABG);                 Surgeon: Long Garvin MD;  Location: [facility] OR;                 Service: Cardiovascular;  Laterality: N/A;  10/6/2020: ENDOSCOPIC HARVEST OF VEIN; Left      Comment:  Procedure: SURGICAL PROCUREMENT, VEIN, ENDOSCOPIC;                 Surgeon: Long Garvin MD;  Location: [facility] OR;                 Service: Cardiovascular;  Laterality: Left;  1964: INCISION AND DRAINAGE; Right      Comment:  knee  9/28/2020: LEFT HEART CATHETERIZATION; Left      Comment:  Procedure: Left heart cath;  Surgeon: Angela Dickens MD;  Location: [facility] CATH/EP LAB;  Service:                Cardiology;  Laterality: Left;  1995: MANDIBLE FRACTURE SURGERY; Left      Comment:  wires in jaw  No date: TRANSESOPHAGEAL ECHOCARDIOGRAPHY      Comment:  x3    Review of patient's family history indicates:      Social History    Socioeconomic History      " Marital status: Single    Tobacco Use      Smoking status: Former        Packs/day: 0.50        Years: 45.00        Pack years: 22.5        Types: Cigarettes        Quit date: 2020        Years since quittin.3      Smokeless tobacco: Never      Tobacco comments: 1 pack per month    Substance and Sexual Activity      Alcohol use: Not Currently        Comment: Social      Drug use: Not Currently        Types: Marijuana      Sexual activity: Not Currently      Current Outpatient Medications:  amLODIPine (NORVASC) 5 MG tablet, Take 1 tablet (5 mg total) by mouth once daily., Disp: 90 tablet, Rfl: 3  aspirin 81 MG Chew, Take 1 tablet (81 mg total) by mouth once daily., Disp: 90 tablet, Rfl: 3  atorvastatin (LIPITOR) 80 MG tablet, TAKE ONE-HALF TABLET (40MG) BY MOUTH EVERY EVENING, Disp: 90 tablet, Rfl: 3  clopidogreL (PLAVIX) 75 mg tablet, TAKE 1 TABLET (75MG) BY MOUTH ONCE DAILY, Disp: 90 tablet, Rfl: 3  metFORMIN (GLUCOPHAGE) 500 MG tablet, Take 500 mg by mouth daily with breakfast., Disp: , Rfl:   omega-3 fatty acids/fish oil (FISH OIL-OMEGA-3 FATTY ACIDS) 300-1,000 mg capsule, Take 1 capsule by mouth 2 (two) times a day., Disp: , Rfl:   metoprolol succinate (TOPROL-XL) 50 MG 24 hr tablet, Take 1 tablet (50 mg total) by mouth 2 (two) times daily., Disp: 60 tablet, Rfl: 11    Current Facility-Administered Medications:  acetaminophen tablet 650 mg, 650 mg, Oral, Once PRN, Ayden Hanson MD  albuterol inhaler 2 puff, 2 puff, Inhalation, Q20 Min PRN, Ayden Hanson MD  methylPREDNISolone sodium succinate injection 40 mg, 40 mg, Intravenous, Once PRN, Ayden Hanson MD        Review of patient's allergies indicates:   -- Ace inhibitors -- Hives and Swelling       Constitution: Negative for chills and fever.   Neck: Negative for neck pain.   Cardiovascular:  Negative for chest trauma.   Respiratory: Negative.     Gastrointestinal: Negative.    Skin:  Positive for laceration and bruising.   Neurological:   Negative for dizziness, light-headedness and passing out.     Objective:      Physical Exam   Constitutional: He is oriented to person, place, and time. No distress.   HENT:   Head: Normocephalic.       Mouth/Throat:       Eyes: Lids are normal. Right eye exhibits no chemosis, no discharge, no exudate and no hordeolum. No foreign body present in the right eye. Left eye exhibits no chemosis, no discharge, no exudate and no hordeolum. No foreign body present in the left eye. Right conjunctiva is not injected. Right conjunctiva has no hemorrhage. Left conjunctiva is not injected. Left conjunctiva has no hemorrhage. Right eye exhibits normal extraocular motion and no nystagmus. Left eye exhibits normal extraocular motion and no nystagmus. vision grossly intact gaze aligned appropriately   Cardiovascular: Normal rate.   Pulmonary/Chest: Effort normal. No respiratory distress.   Neurological: no focal deficit. He is alert and oriented to person, place, and time.   Psychiatric: His behavior is normal. Mood normal.       Assessment:       1. Facial injury, initial encounter    2. Facial laceration, initial encounter          Plan:     On exam large laceration inner surface of lip, along with significant bruising to face, we are unable to repair this type of laceration in this setting patient advised to go to Ed for immediate evaluation, may need plastics consult. He states understanding, will drive to [facility] now, vitals and neuro stable.     Facial injury, initial encounter  -     Cancel: CT Maxillofacial W WO Contrast; Future; Expected date: 01/27/2023  -     Creatinine, serum; Future; Expected date: 01/27/2023  -     Cancel: CT Maxillofacial Without Contrast; Future; Expected date: 01/27/2023    Facial laceration, initial encounter  -     Cancel: CT Maxillofacial W WO Contrast; Future; Expected date: 01/27/2023  -     Creatinine, serum; Future; Expected date: 01/27/2023  -     Cancel: CT Maxillofacial Without Contrast;  Future; Expected date: 01/27/2023

## 2023-01-27 NOTE — ED PROVIDER NOTES
"Encounter Date: 1/27/2023       History     Chief Complaint   Patient presents with    Lip Laceration     Tripped and fell, did not hit head but split top lip.     62-year-old male past medical history of hypertension, hyperlipidemia, atrial flutter, presents emergency department with a fall.  He was intoxicated last night playing the drums when he tripped and fell over 1 of his symbols and hit his face on the "high hat".  He says this happened around midnight.  He was drinking alcohol.  He says that he went to urgent care this morning get it checked out any told him it was too complex repair and to come to the emergency room.  Patient thinks his tetanus vaccine is up-to-date.  He says that he did not lose consciousness during the event.  No seizure activity.  No nausea vomiting.     Review of patient's allergies indicates:   Allergen Reactions    Ace inhibitors Hives and Swelling     Past Medical History:   Diagnosis Date    Anticoagulant long-term use     Atrial flutter 2019    Coronary artery disease     Hyperlipidemia 2009    Hypertension 2009     Past Surgical History:   Procedure Laterality Date    CORONARY ARTERY BYPASS GRAFT (CABG) N/A 10/6/2020    Procedure: CORONARY ARTERY BYPASS GRAFT (CABG);  Surgeon: Long Garvin MD;  Location: [facility] OR;  Service: Cardiovascular;  Laterality: N/A;    ENDOSCOPIC HARVEST OF VEIN Left 10/6/2020    Procedure: SURGICAL PROCUREMENT, VEIN, ENDOSCOPIC;  Surgeon: Long Garvin MD;  Location: [facility] OR;  Service: Cardiovascular;  Laterality: Left;    INCISION AND DRAINAGE Right 1964    knee    LEFT HEART CATHETERIZATION Left 9/28/2020    Procedure: Left heart cath;  Surgeon: Angela Dickens MD;  Location: [facility] CATH/EP LAB;  Service: Cardiology;  Laterality: Left;    MANDIBLE FRACTURE SURGERY Left 1995    wires in jaw    TRANSESOPHAGEAL ECHOCARDIOGRAPHY      x3     Family History   Problem Relation Age of Onset    Heart failure Mother     Heart disease Mother     Heart " disease Father     Heart failure Father      Social History     Tobacco Use    Smoking status: Former     Packs/day: 0.50     Years: 45.00     Pack years: 22.50     Types: Cigarettes     Quit date: 2020     Years since quittin.3    Smokeless tobacco: Never    Tobacco comments:     1 pack per month   Substance Use Topics    Alcohol use: Not Currently     Comment: Social    Drug use: Not Currently     Types: Marijuana     Review of Systems   Constitutional:  Negative for chills and fever.   HENT:  Positive for facial swelling. Negative for sore throat.    Respiratory:  Negative for shortness of breath.    Cardiovascular:  Negative for chest pain and palpitations.   Gastrointestinal:  Negative for abdominal pain, nausea and vomiting.   Genitourinary:  Negative for dysuria.   Musculoskeletal:  Negative for back pain.   Skin:  Positive for wound. Negative for rash.   Neurological:  Negative for weakness and headaches.   Hematological:  Does not bruise/bleed easily.   All other systems reviewed and are negative.    Physical Exam     Initial Vitals [23 1000]   BP Pulse Resp Temp SpO2   (!) 156/84 80 18 97.5 °F (36.4 °C) 98 %      MAP       --         Physical Exam    Nursing note and vitals reviewed.  Constitutional: He appears well-developed and well-nourished. He is not diaphoretic. No distress.   HENT:   Head: Normocephalic and atraumatic.   Mouth/Throat: Oropharynx is clear and moist. No oropharyngeal exudate.   Laceration present to the left upper lip externally 2.5 centimeters.  Internally there is a laceration extending from the upper lip on the left side to the alveolar ridge.   It is about 2.5 centimeters in diameter as well.    Patient has infraorbital swelling and ecchymosis noted in the left side laterally.  Mild tenderness to palpation of the inferior orbit.   Eyes: Conjunctivae and EOM are normal. Pupils are equal, round, and reactive to light.   No extraocular muscle entrapment.  No diplopia.    Neck: Neck supple. No tracheal deviation present.   No midline cervical spine tenderness to palpation   Cardiovascular:  Normal rate, regular rhythm, normal heart sounds and intact distal pulses.           No murmur heard.  Pulmonary/Chest: Breath sounds normal. No stridor. No respiratory distress. He has no wheezes. He has no rhonchi. He has no rales.   Abdominal: Abdomen is soft. Bowel sounds are normal. He exhibits no distension. There is no abdominal tenderness. There is no rebound and no guarding.   Musculoskeletal:         General: No tenderness or edema. Normal range of motion.      Cervical back: Neck supple.      Comments: Left knee with abrasion present full range of motion.  No tenderness to palpation ambulates with a normal gait.     Neurological: He is alert and oriented to person, place, and time. He has normal strength. No cranial nerve deficit or sensory deficit. GCS score is 15. GCS eye subscore is 4. GCS verbal subscore is 5. GCS motor subscore is 6.   Skin: Skin is warm and dry. Capillary refill takes less than 2 seconds. No rash noted. No erythema. No pallor.   Psychiatric: He has a normal mood and affect. His behavior is normal. Thought content normal.       ED Course   Lac Repair    Date/Time: 1/27/2023 1:30 PM  Performed by: Bharath Ang DO  Authorized by: Bharath Ang DO     Consent:     Consent obtained:  Verbal    Consent given by:  Patient    Risks discussed:  Pain, infection and need for additional repair    Alternatives discussed:  No treatment  Universal protocol:     Patient identity confirmed:  Verbally with patient  Anesthesia:     Anesthesia method:  Nerve block    Block needle gauge:  27 G    Block anesthetic:  Lidocaine 1% w/o epi    Block injection procedure:  Anatomic landmarks identified    Block outcome:  Anesthesia achieved  Laceration details:     Location:  Lip    Lip location:  Upper lip, full thickness    Vermilion border involved: no      Length (cm):   3  Pre-procedure details:     Preparation:  Imaging obtained to evaluate for foreign bodies and patient was prepped and draped in usual sterile fashion  Exploration:     Limited defect created (wound extended): no      Hemostasis achieved with:  Cautery    Imaging obtained: bedside ultrasound      Imaging outcome: foreign body noted      Wound exploration: wound explored through full range of motion      Contaminated: no    Treatment:     Area cleansed with:  Saline    Amount of cleaning:  Standard    Irrigation solution:  Sterile saline    Irrigation method:  Syringe    Visualized foreign bodies/material removed: no      Debridement:  None    Undermining:  None    Scar revision: no      Layers/structures repaired:  Deep subcutaneous  Deep subcutaneous:     Suture size:  3-0    Suture material:  Vicryl    Suture technique:  Simple interrupted    Number of sutures:  3  Skin repair:     Repair method:  Sutures    Suture size:  5-0    Suture material:  Nylon    Suture technique:  Simple interrupted    Number of sutures:  2  Approximation:     Approximation:  Close    Vermilion border well-aligned: yes    Repair type:     Repair type:  Simple  Post-procedure details:     Dressing:  Open (no dressing)    Procedure completion:  Tolerated well, no immediate complications  Labs Reviewed - No data to display       Imaging Results              CT Maxillofacial Without Contrast (Final result)  Result time 01/27/23 12:52:07      Final result by Nathaniel Paredes MD (01/27/23 12:52:07)                   Narrative:    CMS MANDATED QUALITY DATA - CT RADIATION - 436    All CT scans at this facility utilize dose modulation, iterative reconstruction, and/or weight based dosing when appropriate to reduce radiation dose to as low as reasonably achievable.      REASON: Facial trauma, blunt    TECHNIQUE: Maxillofacial CT without IV contrast.    COMPARISON: None    FINDINGS:    There is a small fracture fragment of the central upper  anterior maxilla with nondisplaced fracture fragment. (Series 3 image 39 and series 2 image 91).    No other fracture identified. No destructive osseous lesions. There is soft tissue swelling in the left upper lip region with subcutaneous emphysema, consistent with known laceration. There is a hematoma in the subcutaneous tissues of the left upper lobe measuring approximately 2.1 x 0.7 cm. The orbital globes and optic nerves are grossly unremarkable. Degenerative changes of the spine noted.    IMPRESSION:    1.  A small nondisplaced fracture fragment of the central upper anterior maxilla.  2.  Soft tissue swelling of the left upper lip with subcutaneous emphysema and subcutaneous hematoma measuring 2.1 x 0.7 cm.    Electronically signed by:  Nathaniel Paredes DO  1/27/2023 12:52 PM CST Workstation: [id number]                                     CT Cervical Spine Without Contrast (Final result)  Result time 01/27/23 12:45:18      Final result by Nathaniel Paredes MD (01/27/23 12:45:18)                   Narrative:    CMS MANDATED QUALITY DATA - CT RADIATION - 436    All CT scans at this facility utilize dose modulation, iterative reconstruction, and/or weight based dosing when appropriate to reduce radiation dose to as low as reasonably achievable.        Reason: Neck trauma, uncomplicated (NEXUS/CCR neg) (Age 16-64y)    TECHNIQUE: Cervical spine CT without IV contrast obtained with coronal and sagittal reformations.    COMPARISON: None    FINDINGS:    Negative for fracture. There is multilevel intervertebral disc height loss at C5-6, C6-7 and T1 7 with two body marginal osteophytes. There is multilevel severe facet joint arthropathy and uncovertebral joint arthropathy.    C1 and C2 are properly aligned. The odontoid process is intact.    There are coarse calcifications of the bilateral carotid bulbs otherwise the cervical soft tissues are unremarkable. Visualized lung apices are clear.        IMPRESSION:    Cervical  spine degenerative changes, but no acute abnormality.    Electronically signed by:  Nathaniel Paredes DO  1/27/2023 12:45 PM CST Workstation: [id number]                                     CT Head Without Contrast (Final result)  Result time 01/27/23 12:42:52      Final result by Noel Hernandez MD (01/27/23 12:42:52)                   Narrative:    CMS MANDATED QUALITY DATA-CT RADIATION DOSE-436  All CT scans at this facility dose modulation, iterative reconstruction, and or weight-based dosing when appropriate to reduce radiation dose to as low as reasonably achievable.    HISTORY: Head trauma, moderate-severe    FINDINGS: No prior studies for comparison. There is no acute intracranial hemorrhage, with no mass effect or abnormal extra-axial fluid. Scattered areas of nonspecific hypoattenuation involve the subcortical and deep periventricular white matter, with gray-white differentiation maintained.    There is mild generalized prominence of the cortical sulci and ventricles. The cerebellum and brainstem are unremarkable. There are dense carotid siphon vascular calcifications. The visualized paranasal sinuses and mastoid air cells are clear. There is no acute osseous abnormality.    IMPRESSION:  1. No acute intracranial hemorrhage or acute calvarial fracture.  2. Mild scattered chronic small vessel ischemic changes in the white matter.    Electronically signed by:  Noel Hernandez MD  1/27/2023 12:42 PM CST Workstation: [id number]                                     Medications   LIDOcaine (PF) 10 mg/ml (1%) injection 50 mg (50 mg Infiltration Given 1/27/23 1130)     Medical Decision Making:   Clinical Tests:   Lab Tests: Ordered and Reviewed  Radiological Study: Ordered and Reviewed  Medical Tests: Ordered and Reviewed  ED Management:  62-year-old male presents emergency department with a trip and fall last night.  Smashed his face on a part of a drum.  He says tetanus vaccine is up-to-date.  He has been evaluated here  in the ER and has swelling to the face and laceration noted and physical exam.  Wound was repaired by myself at bedside.  Patient tolerated procedure well without difficulty.  Patient had CT scans which demonstrated nondisplaced maxillary fracture.  No other fracture of the cervical spine or any skull fracture any intracranial hemorrhage noted.  Patient will be placed on a soft diet will be given antibiotics as laceration is through and through and will follow-up with ENT on an outpatient basis..      A dictation software program was used for this note.  Please expect some simple typographical  errors in this note.    I had a detailed discussion with the patient and/or guardian regarding: The historical points, exam findings, and diagnostic results supporting the discharge diagnosis, lab results, pertinent radiology results, and the need for outpatient follow-up, for definitive care with a family practitioner, ENT and to return to the emergency department if symptoms worsen or persist or if there are any questions or concerns that arise at home. All questions have been answered in detail. Strict return to Emergency Department precautions have been provided                           Clinical Impression:   Final diagnoses:  [S02.40DA] Closed fracture of left side of maxilla, initial encounter (Primary)  [S01.511A] Complicated laceration of lip, initial encounter        ED Disposition Condition    Discharge Stable          ED Prescriptions       Medication Sig Dispense Start Date End Date Auth. Provider    naproxen (NAPROSYN) 500 MG tablet Take 1 tablet (500 mg total) by mouth 2 (two) times daily as needed (Pain). 10 tablet 1/27/2023 2/1/2023 Bharath Ang, DO    amoxicillin-clavulanate 875-125mg (AUGMENTIN) 875-125 mg per tablet Take 1 tablet by mouth 2 (two) times daily. 14 tablet 1/27/2023 -- Bharath Ang, DO          Follow-up Information       Follow up With Specialties Details Why Contact Info Additional  Information    [facility] - Emergency Dept Emergency Medicine  If symptoms worsen [address]  [facility] [address]  [phone] 1st floor    Claudio Balderrama MD Otolaryngology In 2 weeks  [address] [facility] EAR, NOSE AND THROAT  [facility] [address]  [phone]       [facility]  In 1 week For suture removal [address] [facility] [address]  [phone]                Bharath Ang,   01/27/23 1511

## 2023-02-03 ENCOUNTER — OFFICE VISIT (OUTPATIENT)
Dept: URGENT CARE | Facility: CLINIC | Age: 63
End: 2023-02-03
Payer: MEDICAID

## 2023-02-03 VITALS
WEIGHT: 174 LBS | HEIGHT: 68 IN | HEART RATE: 60 BPM | RESPIRATION RATE: 18 BRPM | BODY MASS INDEX: 26.37 KG/M2 | OXYGEN SATURATION: 96 % | SYSTOLIC BLOOD PRESSURE: 153 MMHG | DIASTOLIC BLOOD PRESSURE: 67 MMHG | TEMPERATURE: 98 F

## 2023-02-03 DIAGNOSIS — Z48.02 VISIT FOR SUTURE REMOVAL: Primary | ICD-10-CM

## 2023-02-03 PROCEDURE — 1159F PR MEDICATION LIST DOCUMENTED IN MEDICAL RECORD: ICD-10-PCS | Mod: CPTII,S$GLB,, | Performed by: NURSE PRACTITIONER

## 2023-02-03 PROCEDURE — 99213 OFFICE O/P EST LOW 20 MIN: CPT | Mod: S$GLB,,, | Performed by: NURSE PRACTITIONER

## 2023-02-03 PROCEDURE — 3078F DIAST BP <80 MM HG: CPT | Mod: CPTII,S$GLB,, | Performed by: NURSE PRACTITIONER

## 2023-02-03 PROCEDURE — 3077F PR MOST RECENT SYSTOLIC BLOOD PRESSURE >= 140 MM HG: ICD-10-PCS | Mod: CPTII,S$GLB,, | Performed by: NURSE PRACTITIONER

## 2023-02-03 PROCEDURE — 3008F PR BODY MASS INDEX (BMI) DOCUMENTED: ICD-10-PCS | Mod: CPTII,S$GLB,, | Performed by: NURSE PRACTITIONER

## 2023-02-03 PROCEDURE — 3077F SYST BP >= 140 MM HG: CPT | Mod: CPTII,S$GLB,, | Performed by: NURSE PRACTITIONER

## 2023-02-03 PROCEDURE — 1159F MED LIST DOCD IN RCRD: CPT | Mod: CPTII,S$GLB,, | Performed by: NURSE PRACTITIONER

## 2023-02-03 PROCEDURE — 1160F RVW MEDS BY RX/DR IN RCRD: CPT | Mod: CPTII,S$GLB,, | Performed by: NURSE PRACTITIONER

## 2023-02-03 PROCEDURE — 3078F PR MOST RECENT DIASTOLIC BLOOD PRESSURE < 80 MM HG: ICD-10-PCS | Mod: CPTII,S$GLB,, | Performed by: NURSE PRACTITIONER

## 2023-02-03 PROCEDURE — 3008F BODY MASS INDEX DOCD: CPT | Mod: CPTII,S$GLB,, | Performed by: NURSE PRACTITIONER

## 2023-02-03 PROCEDURE — 99213 PR OFFICE/OUTPT VISIT, EST, LEVL III, 20-29 MIN: ICD-10-PCS | Mod: S$GLB,,, | Performed by: NURSE PRACTITIONER

## 2023-02-03 PROCEDURE — 1160F PR REVIEW ALL MEDS BY PRESCRIBER/CLIN PHARMACIST DOCUMENTED: ICD-10-PCS | Mod: CPTII,S$GLB,, | Performed by: NURSE PRACTITIONER

## 2023-02-03 RX ORDER — METOPROLOL TARTRATE 1 MG/ML
INJECTION, SOLUTION INTRAVENOUS
COMMUNITY
End: 2023-10-16 | Stop reason: SDUPTHER

## 2023-02-03 RX ORDER — EMPAGLIFLOZIN 10 MG/1
10 TABLET, FILM COATED ORAL
COMMUNITY
Start: 2022-12-22

## 2023-02-03 RX ORDER — CHOLECALCIFEROL (VITAMIN D3) 50 MCG
CAPSULE ORAL
COMMUNITY

## 2023-02-03 RX ORDER — CHLORHEXIDINE GLUCONATE ORAL RINSE 1.2 MG/ML
SOLUTION DENTAL
COMMUNITY

## 2023-02-03 RX ORDER — NITROGLYCERIN 0.4 MG/1
TABLET SUBLINGUAL
COMMUNITY

## 2023-02-03 NOTE — PROGRESS NOTES
"Subjective:       Patient ID: Louie Wahl is a 62 y.o. male.    Vitals:  height is 5' 8" (1.727 m) and weight is 78.9 kg (174 lb). His oral temperature is 98 °F (36.7 °C). His blood pressure is 153/67 (abnormal) and his pulse is 60. His respiration is 18 and oxygen saturation is 96%.     Chief Complaint: Suture / Staple Removal    Patient is here for suture removal. He had a laceration to his upper lip 1 week ago. Has 2 sutures.      Suture / Staple Removal  The sutures were placed 7 to 10 days ago. He tried nothing since the wound repair. The treatment provided no relief. His temperature was unmeasured prior to arrival. There has been bloody discharge from the wound. There is no redness present. There is no swelling present. There is no pain present. He has no difficulty moving the affected extremity or digit.     Constitution: Negative for sweating, fatigue and fever.   HENT: Negative.     Neck: neck negative.   Respiratory: Negative.     Gastrointestinal: Negative.    Skin:  Positive for laceration.     Objective:      Physical Exam   Constitutional: He is oriented to person, place, and time.   Cardiovascular: Normal rate.   Pulmonary/Chest: Effort normal.   Abdominal: Normal appearance.   Neurological: no focal deficit. He is alert and oriented to person, place, and time.   Skin:            Assessment:       1. Visit for suture removal          Plan:     Patient presents for suture removal. The wound is well healed without signs of infection.  The sutures are removed. Wound care and activity instructions given. Return prn.     Visit for suture removal                     "

## 2023-02-16 DIAGNOSIS — R74.8 ABNORMAL LEVELS OF OTHER SERUM ENZYMES: Primary | ICD-10-CM

## 2023-02-23 ENCOUNTER — TELEPHONE (OUTPATIENT)
Dept: CARDIOLOGY | Facility: CLINIC | Age: 63
End: 2023-02-23
Payer: MEDICAID

## 2023-02-23 NOTE — TELEPHONE ENCOUNTER
Patient to have C7, T1 AVANI with COM DEV on TBD.  Patient will need to hold Plavix and ASA prior to procedure. Please send letter with recommendations.

## 2023-02-23 NOTE — LETTER
2023    Louie Wahl  [address] [place] [address]             [facility]  [address]  [place] [address]  Phone: [phone]  Fax: [phone] Patient: Louie Wahl  : 1960  Referring Doctor:[place] [employer]  Procedure: C7, T1 AVANI  Type of Anesthesia:General Sedation with Propofol  Date of Last Stent:  Date of Last Office Visit:2022    Current Outpatient Medications   Medication Sig    amLODIPine (NORVASC) 5 MG tablet Take 1 tablet (5 mg total) by mouth once daily.    amoxicillin-clavulanate 875-125mg (AUGMENTIN) 875-125 mg per tablet Take 1 tablet by mouth 2 (two) times daily.    aspirin 81 MG Chew Take 1 tablet (81 mg total) by mouth once daily.    atorvastatin (LIPITOR) 80 MG tablet TAKE ONE-HALF TABLET (40MG) BY MOUTH EVERY EVENING    chlorhexidine (PERIDEX) 0.12 % solution chlorhexidine gluconate 0.12 % mouthwash    clopidogreL (PLAVIX) 75 mg tablet TAKE 1 TABLET (75MG) BY MOUTH ONCE DAILY    JARDIANCE 10 mg tablet Take 10 mg by mouth.    metFORMIN (GLUCOPHAGE) 500 MG tablet Take 500 mg by mouth daily with breakfast.    metoprolol (LOPRESSOR) 5 mg/5 mL injection as directed    metoprolol succinate (TOPROL-XL) 50 MG 24 hr tablet Take 1 tablet (50 mg total) by mouth 2 (two) times daily.    nitroGLYCERIN (NITROSTAT) 0.4 MG SL tablet nitroglycerin 0.4 mg sublingual tablet    omega 3-dha-epa-fish oil (FISH OIL) 100-160-1,000 mg Cap Fish Oil   300-1,000 mg 1 bid    omega-3 fatty acids/fish oil (FISH OIL-OMEGA-3 FATTY ACIDS) 300-1,000 mg capsule Take 1 capsule by mouth 2 (two) times a day.     Current Facility-Administered Medications   Medication    acetaminophen tablet 650 mg    albuterol inhaler 2 puff    methylPREDNISolone sodium succinate injection 40 mg       This patient has been assessed for risk factors for clearance of surgery with the following  stipulations:    ___ No contraindications    X    Recommendations for antiplatelet/anticoagulant medications:Patient may hold  Plavix and Aspirin for 7 days prior to surgery.      X    Cleared for surgery with the following contraindications/precautions: moderate risks.    ___ Not cleared for surgery due to the following reasons:      If you have any questions regarding the above, please contact my office at [phone].    Sincerely,      WELLINGTON MaceC  Department of Cardiology   [facility] [place] [place]

## 2023-03-06 ENCOUNTER — HOSPITAL ENCOUNTER (OUTPATIENT)
Dept: RADIOLOGY | Facility: HOSPITAL | Age: 63
Discharge: HOME | End: 2023-03-06
Attending: NURSE PRACTITIONER
Payer: MEDICAID

## 2023-03-06 DIAGNOSIS — R74.8 ABNORMAL LEVELS OF OTHER SERUM ENZYMES: ICD-10-CM

## 2023-03-06 PROCEDURE — 76700 US EXAM ABDOM COMPLETE: CPT | Mod: TC,PO

## 2023-03-09 ENCOUNTER — TELEPHONE (OUTPATIENT)
Dept: PAIN MEDICINE | Facility: CLINIC | Age: 63
End: 2023-03-09
Payer: MEDICAID

## 2023-03-09 NOTE — TELEPHONE ENCOUNTER
----- Message from Janes Westbrook sent at 3/9/2023 12:34 PM CST -----  Contact: self  Type: Sooner Appointment Request        Caller is requesting a sooner appointment. Caller declined first available appointment listed below. Caller will not accept being placed on the waitlist and is requesting a message be sent to doctor.        Name of Caller: Patient   Best Call Back Number: [phone]  Additional Information: Patient states he needs to schedule a rhizotomy procedure . Pt states the [facility] orthopedic and spine emailed him an referral to go through to get scheduled. Pt states he tried to book with [employer] and they told him they don't do it then [facility]  Plz call pt to get him scheduled. Pt also stated Dr. Jasmeet zuñiga is who wrote the referral. Thanks

## 2023-03-09 NOTE — TELEPHONE ENCOUNTER
----- Message from Janes Westbrook sent at 3/9/2023 12:34 PM CST -----  Contact: self  Type: Sooner Appointment Request        Caller is requesting a sooner appointment. Caller declined first available appointment listed below. Caller will not accept being placed on the waitlist and is requesting a message be sent to doctor.        Name of Caller: Patient   Best Call Back Number: [phone]  Additional Information: Patient states he needs to schedule a rhizotomy procedure . Pt states the [facility] orthopedic and spine emailed him an referral to go through to get scheduled. Pt states he tried to book with [employer] and they told him they don't do it [facility]  Plz call pt to get him scheduled. Pt also stated Dr. Jasmeet zuñiga is who wrote the referral. Thanks

## 2023-10-16 ENCOUNTER — OFFICE VISIT (OUTPATIENT)
Dept: CARDIOLOGY | Facility: CLINIC | Age: 63
End: 2023-10-16
Payer: MEDICAID

## 2023-10-16 VITALS
OXYGEN SATURATION: 96 % | HEIGHT: 68 IN | BODY MASS INDEX: 26.22 KG/M2 | SYSTOLIC BLOOD PRESSURE: 122 MMHG | WEIGHT: 173 LBS | DIASTOLIC BLOOD PRESSURE: 60 MMHG | HEART RATE: 71 BPM | RESPIRATION RATE: 16 BRPM

## 2023-10-16 DIAGNOSIS — E78.5 DYSLIPIDEMIA: ICD-10-CM

## 2023-10-16 DIAGNOSIS — Z95.1 S/P CABG X 5: ICD-10-CM

## 2023-10-16 DIAGNOSIS — I10 ESSENTIAL HYPERTENSION: ICD-10-CM

## 2023-10-16 DIAGNOSIS — I74.10 THROMBUS OF AORTA: ICD-10-CM

## 2023-10-16 DIAGNOSIS — I25.118 CORONARY ARTERY DISEASE OF NATIVE ARTERY OF NATIVE HEART WITH STABLE ANGINA PECTORIS: ICD-10-CM

## 2023-10-16 PROCEDURE — 1160F RVW MEDS BY RX/DR IN RCRD: CPT | Mod: CPTII,,, | Performed by: INTERNAL MEDICINE

## 2023-10-16 PROCEDURE — 99999 PR PBB SHADOW E&M-EST. PATIENT-LVL IV: ICD-10-PCS | Mod: PBBFAC,,, | Performed by: INTERNAL MEDICINE

## 2023-10-16 PROCEDURE — 1159F PR MEDICATION LIST DOCUMENTED IN MEDICAL RECORD: ICD-10-PCS | Mod: CPTII,,, | Performed by: INTERNAL MEDICINE

## 2023-10-16 PROCEDURE — 99214 PR OFFICE/OUTPT VISIT, EST, LEVL IV, 30-39 MIN: ICD-10-PCS | Mod: S$PBB,,, | Performed by: INTERNAL MEDICINE

## 2023-10-16 PROCEDURE — 1160F PR REVIEW ALL MEDS BY PRESCRIBER/CLIN PHARMACIST DOCUMENTED: ICD-10-PCS | Mod: CPTII,,, | Performed by: INTERNAL MEDICINE

## 2023-10-16 PROCEDURE — 3008F BODY MASS INDEX DOCD: CPT | Mod: CPTII,,, | Performed by: INTERNAL MEDICINE

## 2023-10-16 PROCEDURE — 3074F PR MOST RECENT SYSTOLIC BLOOD PRESSURE < 130 MM HG: ICD-10-PCS | Mod: CPTII,,, | Performed by: INTERNAL MEDICINE

## 2023-10-16 PROCEDURE — 93005 ELECTROCARDIOGRAM TRACING: CPT | Mod: PBBFAC,PN | Performed by: INTERNAL MEDICINE

## 2023-10-16 PROCEDURE — 3078F DIAST BP <80 MM HG: CPT | Mod: CPTII,,, | Performed by: INTERNAL MEDICINE

## 2023-10-16 PROCEDURE — 99214 OFFICE O/P EST MOD 30 MIN: CPT | Mod: PBBFAC,PN | Performed by: INTERNAL MEDICINE

## 2023-10-16 PROCEDURE — 99214 OFFICE O/P EST MOD 30 MIN: CPT | Mod: S$PBB,,, | Performed by: INTERNAL MEDICINE

## 2023-10-16 PROCEDURE — 1159F MED LIST DOCD IN RCRD: CPT | Mod: CPTII,,, | Performed by: INTERNAL MEDICINE

## 2023-10-16 PROCEDURE — 93010 ELECTROCARDIOGRAM REPORT: CPT | Mod: S$PBB,,, | Performed by: INTERNAL MEDICINE

## 2023-10-16 PROCEDURE — 99999 PR PBB SHADOW E&M-EST. PATIENT-LVL IV: CPT | Mod: PBBFAC,,, | Performed by: INTERNAL MEDICINE

## 2023-10-16 PROCEDURE — 3074F SYST BP LT 130 MM HG: CPT | Mod: CPTII,,, | Performed by: INTERNAL MEDICINE

## 2023-10-16 PROCEDURE — 3008F PR BODY MASS INDEX (BMI) DOCUMENTED: ICD-10-PCS | Mod: CPTII,,, | Performed by: INTERNAL MEDICINE

## 2023-10-16 PROCEDURE — 93010 EKG 12-LEAD: ICD-10-PCS | Mod: S$PBB,,, | Performed by: INTERNAL MEDICINE

## 2023-10-16 PROCEDURE — 3078F PR MOST RECENT DIASTOLIC BLOOD PRESSURE < 80 MM HG: ICD-10-PCS | Mod: CPTII,,, | Performed by: INTERNAL MEDICINE

## 2023-10-16 RX ORDER — RANOLAZINE 500 MG/1
500 TABLET, EXTENDED RELEASE ORAL 2 TIMES DAILY
Qty: 60 TABLET | Refills: 11 | Status: SHIPPED | OUTPATIENT
Start: 2023-10-16 | End: 2024-10-15

## 2023-10-16 NOTE — ASSESSMENT & PLAN NOTE
I have encouraged him to continue on same medicines including amlodipine and metoprolol.  Recommend to obtain a Lexiscan Myoview in view of progression of anginal symptoms.  Also to continue on dual antiplatelet therapy with aspirin and Plavix

## 2023-10-16 NOTE — PROGRESS NOTES
Subjective:    Patient ID:  Louie Wahl is a 63 y.o. male patient here for evaluation Follow-up (He has noticed some palpitations )      History of Present Illness:     Patient is a 63-year-old gentleman with history of five-vessel bypass surgery in October 6, 2020 is here for follow-up evaluation noted to have some shortness of breath with moderate effort.  Has some burning in his sensation in the interscapular area similar to his presentation prior to his bypass surgery.  He is concerned about progression of native coronary artery disease although denies having any arm neck or jaw pain and no significant swelling in the lower extremities.  No cough or congestion no fevers no chills no nausea vomiting        Review of patient's allergies indicates:   Allergen Reactions    Ace inhibitors Hives and Swelling       Past Medical History:   Diagnosis Date    Anticoagulant long-term use     Atrial flutter 2019    Coronary artery disease     Hyperlipidemia 2009    Hypertension 2009     Past Surgical History:   Procedure Laterality Date    CORONARY ARTERY BYPASS GRAFT (CABG) N/A 10/6/2020    Procedure: CORONARY ARTERY BYPASS GRAFT (CABG);  Surgeon: Long Garvin MD;  Location: [facility] OR;  Service: Cardiovascular;  Laterality: N/A;    ENDOSCOPIC HARVEST OF VEIN Left 10/6/2020    Procedure: SURGICAL PROCUREMENT, VEIN, ENDOSCOPIC;  Surgeon: Long Garvin MD;  Location: [facility] OR;  Service: Cardiovascular;  Laterality: Left;    INCISION AND DRAINAGE Right 1964    knee    LEFT HEART CATHETERIZATION Left 9/28/2020    Procedure: Left heart cath;  Surgeon: Angela Dickens MD;  Location: [facility] CATH/EP LAB;  Service: Cardiology;  Laterality: Left;    MANDIBLE FRACTURE SURGERY Left 1995    wires in jaw    TRANSESOPHAGEAL ECHOCARDIOGRAPHY      x3     Social History     Tobacco Use    Smoking status: Former     Current packs/day: 0.00     Average packs/day: 0.5 packs/day for 45.0 years (22.5 ttl pk-yrs)     Types:  Cigarettes     Start date: 9/5/1975     Quit date: 9/5/2020     Years since quitting: 3.1    Smokeless tobacco: Never    Tobacco comments:     1 pack per month   Substance Use Topics    Alcohol use: Not Currently     Comment: Social    Drug use: Not Currently     Types: Marijuana        Review of Systems:    As noted in HPI in addition      REVIEW OF SYSTEMS  CARDIOVASCULAR: No recent chest pain, palpitations, arm, neck, or jaw pain  RESPIRATORY: No recent fever, cough chills, SOB or congestion  : No blood in the urine  GI: No Nausea, vomiting, constipation, diarrhea, blood, or reflux.  MUSCULOSKELETAL: No myalgias  NEURO: No lightheadedness or dizziness  EYES: No Double vision, blurry, vision or headache              Objective        Vitals:    10/16/23 1318   BP: 122/60   Pulse: 71   Resp: 16       LIPIDS - LAST 2   Lab Results   Component Value Date    CHOL 136 06/22/2021    HDL 26 (L) 06/22/2021    LDLCALC 77.2 06/22/2021    TRIG 164 (H) 06/22/2021    CHOLHDL 19.1 (L) 06/22/2021       CBC - LAST 2  Lab Results   Component Value Date    WBC 7.98 06/22/2021    WBC 8.30 11/19/2020    RBC 4.75 06/22/2021    RBC 4.22 (L) 11/19/2020    HGB 15.3 06/22/2021    HGB 12.9 (L) 11/19/2020    HCT 45.2 06/22/2021    HCT 40.4 11/19/2020    MCV 95 06/22/2021    MCV 96 11/19/2020    MCH 32.2 (H) 06/22/2021    MCH 30.6 11/19/2020    MCHC 33.8 06/22/2021    MCHC 31.9 (L) 11/19/2020    RDW 12.0 06/22/2021    RDW 12.6 11/19/2020     06/22/2021     11/19/2020    MPV 10.0 06/22/2021    MPV 10.8 11/19/2020    GRAN 4.5 06/22/2021    GRAN 56.2 06/22/2021    LYMPH 2.4 06/22/2021    LYMPH 29.7 06/22/2021    MONO 0.8 06/22/2021    MONO 10.2 06/22/2021    BASO 0.04 06/22/2021    BASO 0.02 10/09/2020    NRBC 0 06/22/2021    NRBC 0 10/09/2020       CHEMISTRY & LIVER FUNCTION - LAST 2  Lab Results   Component Value Date     (L) 06/22/2021     11/19/2020    K 4.2 06/22/2021    K 4.1 11/19/2020    CL 99 06/22/2021    CL  "101 11/19/2020    CO2 28 06/22/2021    CO2 27 11/19/2020    ANIONGAP 7 (L) 06/22/2021    ANIONGAP 12 11/19/2020    BUN 18 06/22/2021    BUN 13 11/19/2020    CREATININE 1.0 06/22/2021    CREATININE 0.9 11/19/2020     (H) 06/22/2021     (H) 11/19/2020    CALCIUM 9.2 06/22/2021    CALCIUM 9.5 11/19/2020    PH 7.342 (L) 10/06/2020    PH 7.317 (L) 10/06/2020    MG 1.9 11/19/2020    MG 2.0 10/07/2020    ALBUMIN 4.2 06/22/2021    ALBUMIN 4.1 11/19/2020    PROT 7.8 06/22/2021    PROT 7.6 11/19/2020    ALKPHOS 88 06/22/2021    ALKPHOS 82 11/19/2020    ALT 80 (H) 06/22/2021    ALT 30 11/19/2020    AST 74 (H) 06/22/2021    AST 28 11/19/2020    BILITOT 1.0 06/22/2021    BILITOT 0.5 11/19/2020        CARDIAC PROFILE - LAST 2  Lab Results   Component Value Date    TROPONINI <0.030 09/30/2020    TROPONINI <0.030 09/29/2020        COAGULATION - LAST 2  Lab Results   Component Value Date    LABPT 17.4 (H) 10/06/2020    LABPT 13.6 10/06/2020    INR 1.5 10/06/2020    INR 1.1 10/06/2020    APTT 30.3 10/06/2020    APTT 28.5 10/06/2020       ENDOCRINE & PSA - LAST 2  No results found for: "HGBA1C", "MICROALBUR", "TSH", "PROCAL", "PSA"     ECHOCARDIOGRAM RESULTS  Results for orders placed during the hospital encounter of 09/28/20    Transesophageal echo (DIONISIO)    Interpretation Summary  Indication:  Known mobile distal aortic arch plaque with mobile thrombus.    Counseling:  Patient as well as his family member were informed of the risks, benefits as well as alternatives to the procedure and informed consent was obtained.    Procedure:  After obtaining informed consent the patient was sedated with the help of Anesthesia team. A DIONISIO probe was advanced without difficulty and images were obtained. Patient tolerated the procedure well and no immediate complications were noted. Patient was transferred out of the cardiac cath lab in stable medical condition.    Complications:  None    Findings:  1. Aortic valve: Noncoronary cusp of " the aortic valve appears mildly thick and calcified with adequate leaflet motion. No significant regurgitation.  2. Mitral valve is structurally normal with good leaflet excursion. Trace regurgitation.  3. Tricuspid valve is structurally normal with good leaflet excursion. No significant regurgitation.  4. Pulmonary valve is structurally normal with good leaflet excursion. Trace regurgitation.  5. Overall LVEF appears normal.  6. Severe atherosclerotic plaque visualized in the visualized portions of the distal arch and descending thoracic aorta. No obvious mobile component noted.  7. Lipomatous hypertrophy of the interatrial septum.  8. Trace pericardial effusion.    Conclusions:  1. Severe atherosclerotic plaque noted in the visualized portions of the distal arch and descending thoracic aorta. No obvious mobile component noted. Essentially unchanged from previous study.  2. Trace pericardial effusion.      CURRENT/PREVIOUS VISIT EKG  Results for orders placed or performed in visit on 02/07/22   EKG 12-lead    Collection Time: 02/07/22  2:46 PM    Narrative    Test Reason : I25.118,    Vent. Rate : 075 BPM     Atrial Rate : 075 BPM     P-R Int : 186 ms          QRS Dur : 144 ms      QT Int : 386 ms       P-R-T Axes : 021 -48 061 degrees     QTc Int : 431 ms    Sinus rhythm with Premature atrial complexes  Left axis deviation  Nonspecific intraventricular block  T wave abnormality, consider anterior ischemia  Abnormal ECG  When compared with ECG of 23-MAR-2021 14:50,  Premature atrial complexes are now Present  Nonspecific intraventricular block has replaced Right bundle branch block  Confirmed by Segundo Bashir MD ([id number]) on 3/13/2022 5:55:27 PM    Referred By: [email or web address]   SELF           Confirmed By:Segundo Bashir MD     No valid procedures specified.   No results found for this or any previous visit.    10/16/2023 EKG shows normal sinus rhythm right bundle branch block morphology lateral T-wave abnormalities  noted.    PHYSICAL EXAM  CONSTITUTIONAL: Well built, well nourished in no apparent distress  NECK: no carotid bruit, no JVD  LUNGS: CTA  CHEST WALL: no tenderness  HEART: regular rate and rhythm, S1, S2 normal, no murmur, click, rub or gallop   ABDOMEN: soft, non-tender; bowel sounds normal; no masses,  no organomegaly  EXTREMITIES: Extremities normal, no edema, no calf tenderness noted  NEURO: AAO X 3    I HAVE REVIEWED :    The vital signs, nurses notes, and all the pertinent radiology and labs.        Current Outpatient Medications   Medication Instructions    amLODIPine (NORVASC) 5 mg, Oral, Daily    amoxicillin-clavulanate 875-125mg (AUGMENTIN) 875-125 mg per tablet 1 tablet, Oral, 2 times daily    aspirin 81 mg, Oral, Daily    atorvastatin (LIPITOR) 80 MG tablet TAKE ONE-HALF TABLET (40MG) BY MOUTH EVERY EVENING    chlorhexidine (PERIDEX) 0.12 % solution chlorhexidine gluconate 0.12 % mouthwash    clopidogreL (PLAVIX) 75 mg tablet TAKE 1 TABLET (75MG) BY MOUTH ONCE DAILY    JARDIANCE 10 mg, Oral    metFORMIN (GLUCOPHAGE) 500 mg, Oral, With breakfast    metoprolol succinate (TOPROL-XL) 50 mg, Oral, 2 times daily    nitroGLYCERIN (NITROSTAT) 0.4 MG SL tablet nitroglycerin 0.4 mg sublingual tablet    omega 3-dha-epa-fish oil (FISH OIL) 100-160-1,000 mg Cap Fish Oil   300-1,000 mg 1 bid          Assessment & Plan     Essential hypertension  Patient is here for follow-up evaluation and his blood pressure has been reasonably stable at 120 2/60 mm Hg encouraged him to continue on Toprol-XL 50 mg twice a day, maintain on low-salt diet.  Blood pressure has been relatively stable with amlodipine 5 mg daily as well    Coronary artery disease of native artery of native heart with stable angina pectoris  I have encouraged him to continue on same medicines including amlodipine and metoprolol.  Recommend to obtain a Lexiscan Myoview in view of progression of anginal symptoms.  Also to continue on dual antiplatelet therapy  with aspirin and Plavix    Dyslipidemia  Lipid levels are reasonably controlled continue on Lipitor 80 mg daily maintain low-fat low-cholesterol diet along with omega-3 fish oil capsules    History of aortic arch thrombus  Maintain dual antiplatelet therapy and risk factor modification.    Recommend to obtain echocardiogram for LV function assessment and Lexiscan Myoview to evaluate for progression of ischemic heart disease in the meanwhile optimize risk factor modification and aggressive medical therapy  Add Ranexa 500 mg p.o. b.i.d.      No follow-ups on file.

## 2023-10-16 NOTE — ASSESSMENT & PLAN NOTE
Lipid levels are reasonably controlled continue on Lipitor 80 mg daily maintain low-fat low-cholesterol diet along with omega-3 fish oil capsules

## 2023-10-16 NOTE — ASSESSMENT & PLAN NOTE
Patient is here for follow-up evaluation and his blood pressure has been reasonably stable at 120 2/60 mm Hg encouraged him to continue on Toprol-XL 50 mg twice a day, maintain on low-salt diet.  Blood pressure has been relatively stable with amlodipine 5 mg daily as well

## 2023-11-03 ENCOUNTER — TELEPHONE (OUTPATIENT)
Dept: CARDIOLOGY | Facility: CLINIC | Age: 63
End: 2023-11-03
Payer: MEDICAID

## 2023-11-03 NOTE — TELEPHONE ENCOUNTER
----- Message from Wen Kang, Patient Care Assistant sent at 11/3/2023 12:56 PM CDT -----  Regarding: returning calll  Contact: pt  Type:  Patient Returning Call    Who Called:  pt     Who Left Message for Patient:  Marilu Whiteside     Does the patient know what this is regarding?:  yes     Best Call Back Number:  [phone] (home)     Additional Information:  please call pt to advise. Thanks!

## 2023-11-06 ENCOUNTER — TELEPHONE (OUTPATIENT)
Dept: CARDIOLOGY | Facility: CLINIC | Age: 63
End: 2023-11-06
Payer: MEDICAID

## 2023-11-06 NOTE — TELEPHONE ENCOUNTER
----- Message from Alfonso Delaney sent at 11/6/2023 10:55 AM CST -----  Type:  Sooner Appointment Request    Caller is requesting a sooner appointment.  Caller declined first available appointment listed below.  Caller will not accept being placed on the waitlist and is requesting a message be sent to doctor.    Name of Caller:  pt  When is the first available appointment?  11/13 but do not need to see the dr before the stress test--and the next one after that was 1/10 and that was even with the NP schedule--please call and advise  Symptoms:  test results  Would the patient rather a call back or a response via [facility]? call  Best Call Back Number:  [phone] (home)     Additional Information:  thank you

## 2023-11-16 ENCOUNTER — TELEPHONE (OUTPATIENT)
Dept: CARDIOLOGY | Facility: HOSPITAL | Age: 63
End: 2023-11-16

## 2023-11-16 NOTE — TELEPHONE ENCOUNTER
Patient advised, test will be at [facility] ([address]   Will need to register on the first floor at the main entrance.   Patient advised that arrival time is 6:20am.  Patient advised that he may be here about 3.5-4 hours, and may want to bring something to occupy their time, as there will be periods of waiting.    Patient advised, may take his medications prior to testing if you need to.  Patient should HOLD Nitroglycerin. Advised if he needs to eat to take his medications, please keep it light, like toast and juice.    Patient advised to avoid all caffeine 12 hours prior to testing.  This includes decaf tea and coffee.    Will provide peanut butter crackers for a snack after stress test.  If patient would prefer something else, please bring a snack from home.    Wear comfortable clothing.   No lotions, oils, or powders to the upper chest area. May wear deodorant.    No metal jewelry, buttons, or zippers to the upper body.  Patient verbalizes understanding of instructions.

## 2023-11-17 ENCOUNTER — HOSPITAL ENCOUNTER (OUTPATIENT)
Dept: CARDIOLOGY | Facility: HOSPITAL | Age: 63
Discharge: HOME | End: 2023-11-17
Attending: INTERNAL MEDICINE
Payer: MEDICAID

## 2023-11-17 ENCOUNTER — HOSPITAL ENCOUNTER (OUTPATIENT)
Dept: RADIOLOGY | Facility: HOSPITAL | Age: 63
Discharge: HOME | End: 2023-11-17
Attending: INTERNAL MEDICINE
Payer: MEDICAID

## 2023-11-17 VITALS — WEIGHT: 173 LBS | BODY MASS INDEX: 26.22 KG/M2 | HEIGHT: 68 IN

## 2023-11-17 DIAGNOSIS — I25.118 CORONARY ARTERY DISEASE OF NATIVE ARTERY OF NATIVE HEART WITH STABLE ANGINA PECTORIS: ICD-10-CM

## 2023-11-17 DIAGNOSIS — Z95.1 S/P CABG X 5: ICD-10-CM

## 2023-11-17 LAB
AORTIC ROOT ANNULUS: 2.8 CM
AORTIC VALVE CUSP SEPERATION: 1.5 CM
AV INDEX (PROSTH): 0.65
AV MEAN GRADIENT: 5 MMHG
AV PEAK GRADIENT: 9 MMHG
AV VALVE AREA BY VELOCITY RATIO: 2.07 CM²
AV VALVE AREA: 2.05 CM²
AV VELOCITY RATIO: 0.66
BSA FOR ECHO PROCEDURE: 1.94 M2
CV ECHO LV RWT: 0.63 CM
CV PHARM DOSE: 0.4 MG
CV STRESS BASE HR: 54 BPM
DIASTOLIC BLOOD PRESSURE: 76 MMHG
DOP CALC AO PEAK VEL: 1.49 M/S
DOP CALC AO VTI: 34.2 CM
DOP CALC LVOT AREA: 3.1 CM2
DOP CALC LVOT DIAMETER: 2 CM
DOP CALC LVOT PEAK VEL: 0.98 M/S
DOP CALC LVOT STROKE VOLUME: 70.02 CM3
DOP CALCLVOT PEAK VEL VTI: 22.3 CM
E WAVE DECELERATION TIME: 174 MSEC
E/A RATIO: 2.76
E/E' RATIO: 11.3 M/S
ECHO LV POSTERIOR WALL: 1.28 CM (ref 0.6–1.1)
EJECTION FRACTION- HIGH: 65 %
END DIASTOLIC INDEX-HIGH: 153 ML/M2
END DIASTOLIC INDEX-LOW: 93 ML/M2
END SYSTOLIC INDEX-HIGH: 71 ML/M2
END SYSTOLIC INDEX-LOW: 31 ML/M2
FRACTIONAL SHORTENING: 32 % (ref 28–44)
INTERVENTRICULAR SEPTUM: 1.41 CM (ref 0.6–1.1)
IVRT: 90 MSEC
LEFT ATRIUM SIZE: 4.5 CM
LEFT INTERNAL DIMENSION IN SYSTOLE: 2.77 CM (ref 2.1–4)
LEFT VENTRICLE DIASTOLIC VOLUME INDEX: 37.97 ML/M2
LEFT VENTRICLE DIASTOLIC VOLUME: 72.9 ML
LEFT VENTRICLE MASS INDEX: 105 G/M2
LEFT VENTRICLE SYSTOLIC VOLUME INDEX: 15 ML/M2
LEFT VENTRICLE SYSTOLIC VOLUME: 28.8 ML
LEFT VENTRICULAR INTERNAL DIMENSION IN DIASTOLE: 4.07 CM (ref 3.5–6)
LEFT VENTRICULAR MASS: 201.53 G
LV LATERAL E/E' RATIO: 10.27 M/S
LV SEPTAL E/E' RATIO: 12.56 M/S
LVOT MG: 2 MMHG
LVOT MV: 0.54 CM/S
MV PEAK A VEL: 0.41 M/S
MV PEAK E VEL: 1.13 M/S
MV STENOSIS PRESSURE HALF TIME: 55 MS
MV VALVE AREA P 1/2 METHOD: 4 CM2
NUC REST DIASTOLIC VOLUME INDEX: 93
NUC REST EJECTION FRACTION: 65
NUC REST SYSTOLIC VOLUME INDEX: 33
NUC STRESS DIASTOLIC VOLUME INDEX: 98
NUC STRESS EJECTION FRACTION: 71 %
NUC STRESS SYSTOLIC VOLUME INDEX: 28
OHS CV CPX 1 MINUTE RECOVERY HEART RATE: 78 BPM
OHS CV CPX 85 PERCENT MAX PREDICTED HEART RATE MALE: 133
OHS CV CPX MAX PREDICTED HEART RATE: 157
OHS CV CPX PATIENT IS FEMALE: 0
OHS CV CPX PATIENT IS MALE: 1
OHS CV CPX PEAK DIASTOLIC BLOOD PRESSURE: 66 MMHG
OHS CV CPX PEAK HEAR RATE: 78 BPM
OHS CV CPX PEAK RATE PRESSURE PRODUCT: 8424
OHS CV CPX PEAK SYSTOLIC BLOOD PRESSURE: 108 MMHG
OHS CV CPX PERCENT MAX PREDICTED HEART RATE ACHIEVED: 50
OHS CV CPX RATE PRESSURE PRODUCT PRESENTING: 7776
PISA TR MAX VEL: 2.51 M/S
RA PRESSURE ESTIMATED: 3 MMHG
RETIRED EF AND QEF - SEE NOTES: 53 %
RIGHT VENTRICULAR END-DIASTOLIC DIMENSION: 3.2 CM
RV TB RVSP: 6 MMHG
SYSTOLIC BLOOD PRESSURE: 144 MMHG
TDI LATERAL: 0.11 M/S
TDI SEPTAL: 0.09 M/S
TDI: 0.1 M/S
TR MAX PG: 25 MMHG
TV REST PULMONARY ARTERY PRESSURE: 28 MMHG
Z-SCORE OF LEFT VENTRICULAR DIMENSION IN END DIASTOLE: -2.86
Z-SCORE OF LEFT VENTRICULAR DIMENSION IN END SYSTOLE: -1.47

## 2023-11-17 PROCEDURE — 93018 CV STRESS TEST I&R ONLY: CPT | Mod: ,,, | Performed by: INTERNAL MEDICINE

## 2023-11-17 PROCEDURE — 93306 ECHO (CUPID ONLY): ICD-10-PCS | Mod: 26,,, | Performed by: INTERNAL MEDICINE

## 2023-11-17 PROCEDURE — 93016 CV STRESS TEST SUPVJ ONLY: CPT | Mod: ,,,

## 2023-11-17 PROCEDURE — 78452 HT MUSCLE IMAGE SPECT MULT: CPT | Mod: 26,,, | Performed by: INTERNAL MEDICINE

## 2023-11-17 PROCEDURE — 93306 TTE W/DOPPLER COMPLETE: CPT | Mod: 26,,, | Performed by: INTERNAL MEDICINE

## 2023-11-17 PROCEDURE — 78452 NUCLEAR STRESS - CARDIOLOGY INTERPRETED (CUPID ONLY): ICD-10-PCS | Mod: 26,,, | Performed by: INTERNAL MEDICINE

## 2023-11-17 PROCEDURE — 93306 TTE W/DOPPLER COMPLETE: CPT

## 2023-11-17 PROCEDURE — 93018 NUCLEAR STRESS - CARDIOLOGY INTERPRETED (CUPID ONLY): ICD-10-PCS | Mod: ,,, | Performed by: INTERNAL MEDICINE

## 2023-11-17 PROCEDURE — 78452 HT MUSCLE IMAGE SPECT MULT: CPT

## 2023-11-17 PROCEDURE — 93016 NUCLEAR STRESS - CARDIOLOGY INTERPRETED (CUPID ONLY): ICD-10-PCS | Mod: ,,,

## 2023-11-17 RX ORDER — REGADENOSON 0.08 MG/ML
0.4 INJECTION, SOLUTION INTRAVENOUS ONCE
Status: COMPLETED | OUTPATIENT
Start: 2023-11-17 | End: 2023-11-17

## 2023-11-17 RX ADMIN — REGADENOSON 0.4 MG: 0.08 INJECTION, SOLUTION INTRAVENOUS at 08:11

## 2023-12-05 ENCOUNTER — TELEPHONE (OUTPATIENT)
Dept: CARDIOLOGY | Facility: CLINIC | Age: 63
End: 2023-12-05
Payer: MEDICAID

## 2023-12-05 NOTE — TELEPHONE ENCOUNTER
About the test results and no need to come in for results. If he is stll having problems we can schedule for a different day.

## 2023-12-17 NOTE — HPI
The patient is a 60-year-old male smoker, who reports a history of exertional related chest pain and shortness of breath over the preceding two years.  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  The decision was made at that time to treat him with anticoagulation, and anti-platelet therapy.  On follow-up of transesophageal echocardiography in March of 2020, the thrombus was no longer present.  On September 30, 2020 the patient underwent scheduled diagnostic coronary angiography by his cardiologist, Dr. Nelli Dickens, revealing triple-vessel coronary artery disease and a left ventricular ejection fraction of 50%.  Repeat transesophageal echocardiography failed to reveal any recurrence of the aortic arch thrombus.  No significant valvular pathology was identified.  The patient underwent consultation with the Thoracic Surgery Service.  Following this discussion, he decided proceed with surgical coronary revascularization.  
normal (ped)...

## 2024-01-29 ENCOUNTER — TELEPHONE (OUTPATIENT)
Dept: CARDIOLOGY | Facility: CLINIC | Age: 64
End: 2024-01-29
Payer: MEDICAID

## 2024-01-29 NOTE — TELEPHONE ENCOUNTER
----- Message from Wen Kang, Patient Care Assistant sent at 1/29/2024  2:39 PM CST -----  Regarding: advice  Contact: pt  Type: Needs Medical Advice    Who Called:  pt     Best Call Back Number: [phone] (home)     Additional Information: pt states he would like a callback regarding an medical clearance. Please call to advise. Thanks!

## 2024-01-29 NOTE — LETTER
2024    Louie Wahl  [address] [address] [place] [address]             [facility]  [address]  [address]  [place] [address]  Phone: [phone]  Fax: [phone] Patient: Louie Wahl  : 1960  Referring Doctor: Dr. Campo  Procedure: Colonoscopy    Current Outpatient Medications   Medication Sig    amLODIPine (NORVASC) 5 MG tablet Take 1 tablet (5 mg total) by mouth once daily.    amoxicillin-clavulanate 875-125mg (AUGMENTIN) 875-125 mg per tablet Take 1 tablet by mouth 2 (two) times daily.    aspirin 81 MG Chew Take 1 tablet (81 mg total) by mouth once daily.    atorvastatin (LIPITOR) 80 MG tablet TAKE ONE-HALF TABLET (40MG) BY MOUTH EVERY EVENING    chlorhexidine (PERIDEX) 0.12 % solution chlorhexidine gluconate 0.12 % mouthwash    clopidogreL (PLAVIX) 75 mg tablet TAKE 1 TABLET (75MG) BY MOUTH ONCE DAILY    JARDIANCE 10 mg tablet Take 10 mg by mouth.    metFORMIN (GLUCOPHAGE) 500 MG tablet Take 500 mg by mouth daily with breakfast.    metoprolol succinate (TOPROL-XL) 50 MG 24 hr tablet Take 1 tablet (50 mg total) by mouth 2 (two) times daily.    nitroGLYCERIN (NITROSTAT) 0.4 MG SL tablet nitroglycerin 0.4 mg sublingual tablet    omega 3-dha-epa-fish oil (FISH OIL) 100-160-1,000 mg Cap Fish Oil   300-1,000 mg 1 bid    ranolazine (RANEXA) 500 MG Tb12 Take 1 tablet (500 mg total) by mouth 2 (two) times daily.     Current Facility-Administered Medications   Medication    acetaminophen tablet 650 mg    albuterol inhaler 2 puff    methylPREDNISolone sodium succinate injection 40 mg       This patient has been assessed for risk factors for clearance of surgery with the following stipulations:    ___ No contraindications  ___ Recommendations for Plavix, hold x __ days  ___ Low risk __ Moderate risk __ High risk      If you have any questions regarding the above, please contact my office at [phone].    Sincerely,

## 2024-02-23 ENCOUNTER — TELEPHONE (OUTPATIENT)
Dept: CARDIOLOGY | Facility: CLINIC | Age: 64
End: 2024-02-23
Payer: MEDICAID

## 2024-02-23 NOTE — LETTER
2024    Louie Wahl  [address] [address] [place] [address]             [facility] [place] [facility]  [address]  [address]  [place] [address]  Phone: [phone]  Fax: [phone] Patient: Louie Wahl  : 1960  Referring Doctor: Dr. Campo  Procedure: Colonoscopy    Current Outpatient Medications   Medication Sig    amLODIPine (NORVASC) 5 MG tablet Take 1 tablet (5 mg total) by mouth once daily.    amoxicillin-clavulanate 875-125mg (AUGMENTIN) 875-125 mg per tablet Take 1 tablet by mouth 2 (two) times daily.    aspirin 81 MG Chew Take 1 tablet (81 mg total) by mouth once daily.    atorvastatin (LIPITOR) 80 MG tablet TAKE ONE-HALF TABLET (40MG) BY MOUTH EVERY EVENING    chlorhexidine (PERIDEX) 0.12 % solution chlorhexidine gluconate 0.12 % mouthwash    clopidogreL (PLAVIX) 75 mg tablet TAKE 1 TABLET (75MG) BY MOUTH ONCE DAILY    JARDIANCE 10 mg tablet Take 10 mg by mouth.    metFORMIN (GLUCOPHAGE) 500 MG tablet Take 500 mg by mouth daily with breakfast.    metoprolol succinate (TOPROL-XL) 50 MG 24 hr tablet Take 1 tablet (50 mg total) by mouth 2 (two) times daily.    nitroGLYCERIN (NITROSTAT) 0.4 MG SL tablet nitroglycerin 0.4 mg sublingual tablet    omega 3-dha-epa-fish oil (FISH OIL) 100-160-1,000 mg Cap Fish Oil   300-1,000 mg 1 bid    ranolazine (RANEXA) 500 MG Tb12 Take 1 tablet (500 mg total) by mouth 2 (two) times daily.     Current Facility-Administered Medications   Medication    acetaminophen tablet 650 mg    albuterol inhaler 2 puff    methylPREDNISolone sodium succinate injection 40 mg       This patient has been assessed for risk factors for clearance of surgery with the following stipulations:    __X_ No contraindications  __X_ Recommendations for Plavix, hold x __7 days and Aspirin, hold x _7_ days  _X__ Cleared for surgery with moderate risks      If you have any questions regarding the above, please  contact my office at [phone].    Sincerely,    Mariella Terrell NP

## 2024-02-23 NOTE — TELEPHONE ENCOUNTER
----- Message from Holden Steiner sent at 2/23/2024 10:21 AM CST -----  Regarding: return call  Contact: [facility]  Type:  Patient Returning Call    Who Called:[facility]/  Andrey  Who Left Message for Patient:office nurse  Does the patient know what this is regarding?:cardiac clearance/ 4th request  Would the patient rather a call back or a response via [facility]? Please respond  Best Call Back Number:[phone]  Additional Information: Fax# [phone]

## 2024-03-19 ENCOUNTER — TELEPHONE (OUTPATIENT)
Dept: CARDIOLOGY | Facility: CLINIC | Age: 64
End: 2024-03-19
Payer: MEDICAID

## 2024-03-19 NOTE — TELEPHONE ENCOUNTER
----- Message from Alfonso Delaney sent at 3/19/2024 12:59 PM CDT -----  Type:  RX Refill Request    Who Called:  pt  Refill or New Rx:  refill  RX Name and Strength:  clopidogreL (PLAVIX) 75 mg tablet  How is the patient currently taking it? (ex. 1XDay):  as directed  Is this a 30 day or 90 day RX:  90  Preferred Pharmacy with phone number:    [facility] [place] - [address]  [address]  [place] [address]  Phone: [phone] Fax: [phone]      Local or Mail Order:  local  Ordering Provider:  Mariella Winters Call Back Number:  [phone] (home)     Additional Information:  please call and advise--thank you

## 2024-05-13 ENCOUNTER — TELEPHONE (OUTPATIENT)
Dept: CARDIOLOGY | Facility: CLINIC | Age: 64
End: 2024-05-13
Payer: MEDICAID

## 2024-05-13 NOTE — TELEPHONE ENCOUNTER
----- Message from Brit Mortensen Patient Care Assistant sent at 5/13/2024 11:37 AM CDT -----  Contact: Pt  Type:  Sooner Appointment Request    Caller is requesting a sooner appointment.  Caller declined first available appointment listed below.  Caller will not accept being placed on the waitlist and is requesting a message be sent to doctor.    Name of Caller:  Pt  When is the first available appointment?  07/03/24  Symptoms:  Chest Tightness, Palpitations  Best Call Back Number:  [phone]  Additional Information:  Please advise

## 2024-05-22 ENCOUNTER — OFFICE VISIT (OUTPATIENT)
Dept: CARDIOLOGY | Facility: CLINIC | Age: 64
End: 2024-05-22
Payer: MEDICAID

## 2024-05-22 VITALS
HEART RATE: 74 BPM | HEIGHT: 68 IN | BODY MASS INDEX: 25.25 KG/M2 | WEIGHT: 166.63 LBS | SYSTOLIC BLOOD PRESSURE: 153 MMHG | OXYGEN SATURATION: 99 % | DIASTOLIC BLOOD PRESSURE: 70 MMHG

## 2024-05-22 DIAGNOSIS — Z95.1 S/P CABG X 5: Primary | ICD-10-CM

## 2024-05-22 DIAGNOSIS — E78.5 DYSLIPIDEMIA: ICD-10-CM

## 2024-05-22 DIAGNOSIS — I10 ESSENTIAL HYPERTENSION: ICD-10-CM

## 2024-05-22 DIAGNOSIS — R94.39 ABNORMAL STRESS TEST: ICD-10-CM

## 2024-05-22 PROCEDURE — 99214 OFFICE O/P EST MOD 30 MIN: CPT | Mod: PBBFAC,PN

## 2024-05-22 PROCEDURE — 1160F RVW MEDS BY RX/DR IN RCRD: CPT | Mod: CPTII,,,

## 2024-05-22 PROCEDURE — 3077F SYST BP >= 140 MM HG: CPT | Mod: CPTII,,,

## 2024-05-22 PROCEDURE — 3078F DIAST BP <80 MM HG: CPT | Mod: CPTII,,,

## 2024-05-22 PROCEDURE — 99999 PR PBB SHADOW E&M-EST. PATIENT-LVL IV: CPT | Mod: PBBFAC,,,

## 2024-05-22 PROCEDURE — 93005 ELECTROCARDIOGRAM TRACING: CPT | Mod: PBBFAC,PN | Performed by: INTERNAL MEDICINE

## 2024-05-22 PROCEDURE — 99214 OFFICE O/P EST MOD 30 MIN: CPT | Mod: S$PBB,,,

## 2024-05-22 PROCEDURE — 3008F BODY MASS INDEX DOCD: CPT | Mod: CPTII,,,

## 2024-05-22 PROCEDURE — 93010 ELECTROCARDIOGRAM REPORT: CPT | Mod: S$PBB,,, | Performed by: INTERNAL MEDICINE

## 2024-05-22 PROCEDURE — 1159F MED LIST DOCD IN RCRD: CPT | Mod: CPTII,,,

## 2024-05-22 RX ORDER — NITROGLYCERIN 0.4 MG/1
0.4 TABLET SUBLINGUAL DAILY PRN
Qty: 90 TABLET | Refills: 0 | Status: SHIPPED | OUTPATIENT
Start: 2024-05-22 | End: 2024-08-20

## 2024-05-22 RX ORDER — PANTOPRAZOLE SODIUM 40 MG/1
40 TABLET, DELAYED RELEASE ORAL DAILY
COMMUNITY

## 2024-05-22 RX ORDER — TAMSULOSIN HYDROCHLORIDE 0.4 MG/1
0.4 CAPSULE ORAL DAILY
COMMUNITY
Start: 2024-05-13

## 2024-05-22 RX ORDER — ERGOCALCIFEROL 1.25 MG/1
50000 CAPSULE ORAL
COMMUNITY
Start: 2024-04-27

## 2024-05-22 NOTE — ASSESSMENT & PLAN NOTE
No reversible perfusion abnormalities noted on recent nuclear stress test.  Patient denies anginal equivalent symptoms.  Continue low-sodium heart healthy diet.  Continue current medication regimen.  Increase activity as tolerated.

## 2024-05-22 NOTE — ASSESSMENT & PLAN NOTE
Stable.  S/p CABG x5.  Denies anginal equivalent symptoms.  No reversible ischemia nuclear stress test.  Perfusion abnormality secondary to soft tissue shadow and diaphragm shadowing.  Continue current medication regimen.  Continue low-sodium heart healthy diet.

## 2024-05-22 NOTE — ASSESSMENT & PLAN NOTE
Blood pressure is 153/70 today in office.  Patient states that he was not yet taken his blood pressure medications.  He states that he was compliant typically with taking his medications as prescribed.  Recommend maintaining a blood pressure 130/80 or less.  Hold antihypertensives for systolic BP less than 100.  Patient to check blood pressure twice daily.  Notify office if blood pressure is consistently higher than 130/80.  Continue low-sodium heart healthy diet.

## 2024-05-22 NOTE — LETTER
May 22, 2024    Louie Wahl  [address]  [address] [place] [id number]             [facility]  [address]  [address]  [place] [address]  Phone: [phone]  Fax: [phone] Patient: Louie Wahl  : 1960  Provider of procedure: TIFF Campo MD  Procedure date : unknown   Procedure : Colonoscopy                  Current Outpatient Medications   Medication Sig    amLODIPine (NORVASC) 5 MG tablet Take 1 tablet (5 mg total) by mouth once daily.    aspirin 81 MG Chew Take 1 tablet (81 mg total) by mouth once daily.    atorvastatin (LIPITOR) 80 MG tablet TAKE ONE-HALF TABLET (40MG) BY MOUTH EVERY EVENING    clopidogreL (PLAVIX) 75 mg tablet TAKE 1 TABLET (75MG) BY MOUTH ONCE DAILY    ergocalciferol (ERGOCALCIFEROL) 50,000 unit Cap Take 50,000 Units by mouth every 7 days.    JARDIANCE 10 mg tablet Take 10 mg by mouth.    metFORMIN (GLUCOPHAGE) 500 MG tablet Take 500 mg by mouth daily with breakfast.    nitroGLYCERIN (NITROSTAT) 0.4 MG SL tablet nitroglycerin 0.4 mg sublingual tablet    omega 3-dha-epa-fish oil (FISH OIL) 100-160-1,000 mg Cap Fish Oil   300-1,000 mg 1 bid    pantoprazole (PROTONIX) 40 MG tablet Take 40 mg by mouth once daily.    ranolazine (RANEXA) 500 MG Tb12 Take 1 tablet (500 mg total) by mouth 2 (two) times daily.    tamsulosin (FLOMAX) 0.4 mg Cap 0.4 mg once daily.    amoxicillin-clavulanate 875-125mg (AUGMENTIN) 875-125 mg per tablet Take 1 tablet by mouth 2 (two) times daily. (Patient not taking: Reported on 2024)    chlorhexidine (PERIDEX) 0.12 % solution chlorhexidine gluconate 0.12 % mouthwash (Patient not taking: Reported on 2024)    metoprolol succinate (TOPROL-XL) 50 MG 24 hr tablet Take 1 tablet (50 mg total) by mouth 2 (two) times daily.     Current Facility-Administered Medications   Medication    acetaminophen tablet 650 mg    albuterol inhaler 2 puff    methylPREDNISolone sodium  succinate injection 40 mg       This patient has been assessed for risk factors for clearance of surgery with the following stipulations:    ___ No contraindications  _x__ Recommendations for Aspirin hold days _7___ , Plavix Hold days _7___:  __x_ Cleared for surgery with the following contraindications/precautions:  ___ low risk   ____x_ Moderate risk         _____ High risk       If you have any questions regarding the above, please contact my office at [phone].    Sincerely,

## 2024-05-22 NOTE — ASSESSMENT & PLAN NOTE
Recommend repeat lipid panel.  No recent lipid panel on file.  Continue low-sodium heart healthy diet.  Continue atorvastatin 80 mg daily.  Continue omega-3 fatty acids daily.  Reduce saturated fats.  Increase activity as tolerated.

## 2024-05-22 NOTE — PROGRESS NOTES
Subjective:    Patient ID:  Louie Wahl is a 63 y.o. male patient here for evaluation Palpitations (OCCURRENCE (3) LAST NIGHT  STARTED 5/11/24)      History of Present Illness:     Patient was here for a checkup.  He denies chest pain, shortness of breath, lightheadedness dizziness, jaw neck or arm pain, edema or bleeding.  Blood pressure is 153/70 today in office.  He states that his blood pressure is typically less than 130/80 at home in the he had not yet taken his blood pressure medications.  Denies anginal equivalent symptoms.  He was compliant with taking his medications as prescribed.  He was not had recent lab work performed.  Reviewed recent echocardiogram and stress test results with patient.  No reversible ischemia was noted on recent stress test.  Echocardiogram showed normal systolic and diastolic function.  No acute valvular abnormalities noted.  Patient was euvolemic today in office.         Review of patient's allergies indicates:   Allergen Reactions    Ace inhibitors Hives and Swelling       Past Medical History:   Diagnosis Date    Anticoagulant long-term use     Atrial flutter 2019    Coronary artery disease     Hyperlipidemia 2009    Hypertension 2009     Past Surgical History:   Procedure Laterality Date    CORONARY ARTERY BYPASS GRAFT (CABG) N/A 10/6/2020    Procedure: CORONARY ARTERY BYPASS GRAFT (CABG);  Surgeon: Long Garvin MD;  Location: [facility] OR;  Service: Cardiovascular;  Laterality: N/A;    ENDOSCOPIC HARVEST OF VEIN Left 10/6/2020    Procedure: SURGICAL PROCUREMENT, VEIN, ENDOSCOPIC;  Surgeon: Long Garvin MD;  Location: [facility] OR;  Service: Cardiovascular;  Laterality: Left;    INCISION AND DRAINAGE Right 1964    knee    LEFT HEART CATHETERIZATION Left 9/28/2020    Procedure: Left heart cath;  Surgeon: Angela Dickens MD;  Location: [facility] CATH/EP LAB;  Service: Cardiology;  Laterality: Left;    MANDIBLE FRACTURE SURGERY Left 1995    wires in jaw    TRANSESOPHAGEAL  ECHOCARDIOGRAPHY      x3     Social History     Tobacco Use    Smoking status: Former     Current packs/day: 0.00     Average packs/day: 0.5 packs/day for 45.0 years (22.5 ttl pk-yrs)     Types: Cigarettes     Start date: 9/5/1975     Quit date: 9/5/2020     Years since quitting: 3.7    Smokeless tobacco: Never    Tobacco comments:     1 pack per month   Substance Use Topics    Alcohol use: Not Currently     Comment: Social    Drug use: Not Currently     Types: Marijuana        Review of Systems:    As noted in HPI in addition      REVIEW OF SYSTEMS  CARDIOVASCULAR: No recent chest pain, palpitations, arm, neck, or jaw pain  RESPIRATORY: No recent fever, cough chills, SOB or congestion  : No blood in the urine  GI: No Nausea, vomiting, constipation, diarrhea, blood, or reflux.  MUSCULOSKELETAL: No myalgias  NEURO: No lightheadedness or dizziness  EYES: No Double vision, blurry, vision or headache              Objective        Vitals:    05/22/24 0849   BP: (!) 153/70   Pulse: 74       LIPIDS - LAST 2   Lab Results   Component Value Date    CHOL 136 06/22/2021    HDL 26 (L) 06/22/2021    LDLCALC 77.2 06/22/2021    TRIG 164 (H) 06/22/2021    CHOLHDL 19.1 (L) 06/22/2021       CBC - LAST 2  Lab Results   Component Value Date    WBC 7.98 06/22/2021    WBC 8.30 11/19/2020    RBC 4.75 06/22/2021    RBC 4.22 (L) 11/19/2020    HGB 15.3 06/22/2021    HGB 12.9 (L) 11/19/2020    HCT 45.2 06/22/2021    HCT 40.4 11/19/2020    MCV 95 06/22/2021    MCV 96 11/19/2020    MCH 32.2 (H) 06/22/2021    MCH 30.6 11/19/2020    MCHC 33.8 06/22/2021    MCHC 31.9 (L) 11/19/2020    RDW 12.0 06/22/2021    RDW 12.6 11/19/2020     06/22/2021     11/19/2020    MPV 10.0 06/22/2021    MPV 10.8 11/19/2020    GRAN 4.5 06/22/2021    GRAN 56.2 06/22/2021    LYMPH 2.4 06/22/2021    LYMPH 29.7 06/22/2021    MONO 0.8 06/22/2021    MONO 10.2 06/22/2021    BASO 0.04 06/22/2021    BASO 0.02 10/09/2020    NRBC 0 06/22/2021    NRBC 0 10/09/2020  "      CHEMISTRY & LIVER FUNCTION - LAST 2  Lab Results   Component Value Date     (L) 06/22/2021     11/19/2020    K 4.2 06/22/2021    K 4.1 11/19/2020    CL 99 06/22/2021     11/19/2020    CO2 28 06/22/2021    CO2 27 11/19/2020    ANIONGAP 7 (L) 06/22/2021    ANIONGAP 12 11/19/2020    BUN 18 06/22/2021    BUN 13 11/19/2020    CREATININE 1.0 06/22/2021    CREATININE 0.9 11/19/2020     (H) 06/22/2021     (H) 11/19/2020    CALCIUM 9.2 06/22/2021    CALCIUM 9.5 11/19/2020    PH 7.342 (L) 10/06/2020    PH 7.317 (L) 10/06/2020    MG 1.9 11/19/2020    MG 2.0 10/07/2020    ALBUMIN 4.2 06/22/2021    ALBUMIN 4.1 11/19/2020    PROT 7.8 06/22/2021    PROT 7.6 11/19/2020    ALKPHOS 88 06/22/2021    ALKPHOS 82 11/19/2020    ALT 80 (H) 06/22/2021    ALT 30 11/19/2020    AST 74 (H) 06/22/2021    AST 28 11/19/2020    BILITOT 1.0 06/22/2021    BILITOT 0.5 11/19/2020        CARDIAC PROFILE - LAST 2  Lab Results   Component Value Date    TROPONINI <0.030 09/30/2020    TROPONINI <0.030 09/29/2020        COAGULATION - LAST 2  Lab Results   Component Value Date    LABPT 17.4 (H) 10/06/2020    LABPT 13.6 10/06/2020    INR 1.5 10/06/2020    INR 1.1 10/06/2020    APTT 30.3 10/06/2020    APTT 28.5 10/06/2020       ENDOCRINE & PSA - LAST 2  No results found for: "HGBA1C", "MICROALBUR", "TSH", "PROCAL", "PSA"     ECHOCARDIOGRAM RESULTS  Results for orders placed during the hospital encounter of 11/17/23    Echo    Interpretation Summary    Left Ventricle: The left ventricle is normal in size. Mildly increased wall thickness. There is mild concentric hypertrophy. Normal wall motion. There is normal systolic function with a visually estimated ejection fraction of 60 - 65%. There is normal diastolic function.    Right Ventricle: Normal right ventricular cavity size. Wall thickness is normal. Right ventricle wall motion  is normal. Systolic function is normal.    Left Atrium: Left atrium is moderately dilated.    " Right Atrium: Right atrium is mildly dilated.    Aortic Valve: The aortic valve is a trileaflet valve. There is mild aortic valve sclerosis.    IVC/SVC: Normal venous pressure at 3 mmHg.      CURRENT/PREVIOUS VISIT EKG  Results for orders placed or performed in visit on 10/16/23   IN OFFICE EKG 12-LEAD (to [place])    Collection Time: 10/16/23  1:28 PM    Narrative    Test Reason : Z95.1,I25.118,    Vent. Rate : 069 BPM     Atrial Rate : 069 BPM     P-R Int : 174 ms          QRS Dur : 140 ms      QT Int : 382 ms       P-R-T Axes : 033 -14 065 degrees     QTc Int : 409 ms    Normal sinus rhythm  Right bundle branch block  T wave abnormality, consider lateral ischemia  Abnormal ECG  When compared with ECG of 07-FEB-2022 14:46,  Premature atrial complexes are no longer Present  Right bundle branch block has replaced Nonspecific intraventricular block  Confirmed by Simeon Bashir MD ([id number]) on 11/9/2023 8:16:38 PM    Referred By:             Confirmed By:Simeon Bashir MD     No valid procedures specified.   Results for orders placed during the hospital encounter of 11/17/23    Nuclear Stress - Cardiology Interpreted    Interpretation Summary    Abnormal myocardial perfusion scan.  No significant reversible ischemia seen    There are two significant perfusion abnormalities.    Perfusion Abnormality #1 - There is a moderate to severe intensity, moderate sized, equivocal perfusion abnormality that is fixed in the basal to apical inferior wall(s). This finding is equivocal due to diaphragm shadow.    Perfusion Abnormality #2 - There is a small sized, moderate intensity, equivocal perfusion abnormality that is fixed in the mid anteroapical wall(s). This finding is equivocal due to soft tissue shadow.    There are no other significant perfusion abnormalities.    There is a  moderate to severe intensity fixed perfusion abnormality in the inferior wall of the left ventricle secondary to diaphragm attenuation.    There is a mild  to moderate intensity fixed perfusion abnormality in the anteroapical wall of the left ventricle, secondary to soft tissue attenuation.    The gated perfusion images showed an ejection fraction of 65% at rest. The gated perfusion images showed an ejection fraction of 71% post stress. Normal ejection fraction is greater than 53%.    There is normal wall motion at rest and post stress.    LV cavity size is normal at rest and normal at stress.    The ECG portion of the study is negative for ischemia.    The patient reported no chest pain during the stress test.    During stress, occasional PVCs are noted.    No valid procedures specified.    PHYSICAL EXAM  CONSTITUTIONAL: Well built, well nourished in no apparent distress  NECK: no carotid bruit, no JVD  LUNGS: CTA  CHEST WALL: no tenderness  HEART: regular rate and rhythm, S1, S2 normal, no murmur, click, rub or gallop   ABDOMEN: soft, non-tender; bowel sounds normal; no masses,  no organomegaly  EXTREMITIES: Extremities normal, no edema, no calf tenderness noted  NEURO: AAO X 3    I HAVE REVIEWED :    The vital signs, nurses notes, and all the pertinent radiology and labs.        Current Outpatient Medications   Medication Instructions    amLODIPine (NORVASC) 5 mg, Oral, Daily    aspirin 81 mg, Oral, Daily    atorvastatin (LIPITOR) 80 MG tablet TAKE ONE-HALF TABLET (40MG) BY MOUTH EVERY EVENING    clopidogreL (PLAVIX) 75 mg tablet TAKE 1 TABLET (75MG) BY MOUTH ONCE DAILY    ergocalciferol (ERGOCALCIFEROL) 50,000 Units, Oral, Every 7 days    JARDIANCE 10 mg, Oral    metFORMIN (GLUCOPHAGE) 500 mg, Oral, With breakfast    metoprolol succinate (TOPROL-XL) 50 mg, Oral, 2 times daily    nitroGLYCERIN (NITROSTAT) 0.4 mg, Oral, Daily PRN    omega 3-dha-epa-fish oil (FISH OIL) 100-160-1,000 mg Cap Fish Oil   300-1,000 mg 1 bid    pantoprazole (PROTONIX) 40 mg, Oral, Daily    ranolazine (RANEXA) 500 mg, Oral, 2 times daily    tamsulosin (FLOMAX) 0.4 mg, Daily           Assessment & Plan     Abnormal stress test  No reversible perfusion abnormalities noted on recent nuclear stress test.  Patient denies anginal equivalent symptoms.  Continue low-sodium heart healthy diet.  Continue current medication regimen.  Increase activity as tolerated.    S/P CABG x 5  Stable.  S/p CABG x5.  Denies anginal equivalent symptoms.  No reversible ischemia nuclear stress test.  Perfusion abnormality secondary to soft tissue shadow and diaphragm shadowing.  Continue current medication regimen.  Continue low-sodium heart healthy diet.    Dyslipidemia  Recommend repeat lipid panel.  No recent lipid panel on file.  Continue low-sodium heart healthy diet.  Continue atorvastatin 80 mg daily.  Continue omega-3 fatty acids daily.  Reduce saturated fats.  Increase activity as tolerated.    Essential hypertension  Blood pressure is 153/70 today in office.  Patient states that he was not yet taken his blood pressure medications.  He states that he was compliant typically with taking his medications as prescribed.  Recommend maintaining a blood pressure 130/80 or less.  Hold antihypertensives for systolic BP less than 100.  Patient to check blood pressure twice daily.  Notify office if blood pressure is consistently higher than 130/80.  Continue low-sodium heart healthy diet.          Follow up in about 3 months (around 8/22/2024).

## 2024-05-29 LAB
OHS QRS DURATION: 148 MS
OHS QTC CALCULATION: 414 MS

## 2024-07-03 ENCOUNTER — OFFICE VISIT (OUTPATIENT)
Dept: CARDIOLOGY | Facility: CLINIC | Age: 64
End: 2024-07-03
Payer: MEDICAID

## 2024-07-03 VITALS
WEIGHT: 168 LBS | DIASTOLIC BLOOD PRESSURE: 70 MMHG | HEART RATE: 66 BPM | BODY MASS INDEX: 25.46 KG/M2 | OXYGEN SATURATION: 98 % | HEIGHT: 68 IN | SYSTOLIC BLOOD PRESSURE: 128 MMHG | RESPIRATION RATE: 16 BRPM

## 2024-07-03 DIAGNOSIS — Z95.1 S/P CABG X 5: Primary | ICD-10-CM

## 2024-07-03 DIAGNOSIS — E78.5 DYSLIPIDEMIA: ICD-10-CM

## 2024-07-03 DIAGNOSIS — I10 ESSENTIAL HYPERTENSION: ICD-10-CM

## 2024-07-03 DIAGNOSIS — I74.10 THROMBUS OF AORTA: ICD-10-CM

## 2024-07-03 PROCEDURE — 99213 OFFICE O/P EST LOW 20 MIN: CPT | Mod: PBBFAC,PN | Performed by: INTERNAL MEDICINE

## 2024-07-03 PROCEDURE — 3078F DIAST BP <80 MM HG: CPT | Mod: CPTII,,, | Performed by: INTERNAL MEDICINE

## 2024-07-03 PROCEDURE — 3008F BODY MASS INDEX DOCD: CPT | Mod: CPTII,,, | Performed by: INTERNAL MEDICINE

## 2024-07-03 PROCEDURE — 99214 OFFICE O/P EST MOD 30 MIN: CPT | Mod: S$PBB,,, | Performed by: INTERNAL MEDICINE

## 2024-07-03 PROCEDURE — 3074F SYST BP LT 130 MM HG: CPT | Mod: CPTII,,, | Performed by: INTERNAL MEDICINE

## 2024-07-03 PROCEDURE — 1159F MED LIST DOCD IN RCRD: CPT | Mod: CPTII,,, | Performed by: INTERNAL MEDICINE

## 2024-07-03 PROCEDURE — 1160F RVW MEDS BY RX/DR IN RCRD: CPT | Mod: CPTII,,, | Performed by: INTERNAL MEDICINE

## 2024-07-03 PROCEDURE — 99999 PR PBB SHADOW E&M-EST. PATIENT-LVL III: CPT | Mod: PBBFAC,,, | Performed by: INTERNAL MEDICINE

## 2024-07-03 RX ORDER — AMLODIPINE BESYLATE 5 MG/1
5 TABLET ORAL DAILY
Qty: 90 TABLET | Refills: 3 | Status: SHIPPED | OUTPATIENT
Start: 2024-07-03 | End: 2025-07-03

## 2024-07-03 RX ORDER — ATORVASTATIN CALCIUM 80 MG/1
80 TABLET, FILM COATED ORAL NIGHTLY
Qty: 90 TABLET | Refills: 3 | Status: SHIPPED | OUTPATIENT
Start: 2024-07-03 | End: 2025-07-03

## 2024-07-03 RX ORDER — METOPROLOL SUCCINATE 50 MG/1
50 TABLET, EXTENDED RELEASE ORAL 2 TIMES DAILY
Qty: 180 TABLET | Refills: 3 | Status: SHIPPED | OUTPATIENT
Start: 2024-07-03 | End: 2025-07-03

## 2024-07-03 NOTE — ASSESSMENT & PLAN NOTE
Dyslipidemia continue on atorvastatin as well as fish oil capsules he appears relatively stable on this.  Maintain the same regimen continue to maintain on fat restricted carbohydrate cholesterol restricted diet

## 2024-07-03 NOTE — ASSESSMENT & PLAN NOTE
Blood pressure is stable at 120 8/70 mm Hg continue on amlodipine 5 mg daily, metoprolol succinate 50 mg p.o. b.i.d., low-salt low-fat diet

## 2024-07-03 NOTE — ASSESSMENT & PLAN NOTE
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  He appears hemodynamically stable continue on present regimen

## 2024-07-03 NOTE — PROGRESS NOTES
Subjective:    Patient ID:  Louie Wahl is a 63 y.o. male patient here for evaluation Follow-up      History of Present Illness:   Patient is a 63-year-old gentleman with history of known coronary artery disease status post coronary artery bypass surgery and October of 2020 seeking follow-up evaluation.  He is doing reasonably well denies having any episodes of angina arm neck or jaw pain no PND orthopnea and no significant pedal edema noted.  Effort capacity has been fairly good  No nausea vomiting no blood in the stools no black stools noted.  Patient feels good overall.        Review of patient's allergies indicates:   Allergen Reactions    Ace inhibitors Hives and Swelling       Past Medical History:   Diagnosis Date    Anticoagulant long-term use     Atrial flutter 2019    Coronary artery disease     Hyperlipidemia 2009    Hypertension 2009     Past Surgical History:   Procedure Laterality Date    CORONARY ARTERY BYPASS GRAFT (CABG) N/A 10/6/2020    Procedure: CORONARY ARTERY BYPASS GRAFT (CABG);  Surgeon: Long Garvin MD;  Location: [facility] OR;  Service: Cardiovascular;  Laterality: N/A;    ENDOSCOPIC HARVEST OF VEIN Left 10/6/2020    Procedure: SURGICAL PROCUREMENT, VEIN, ENDOSCOPIC;  Surgeon: Long Garvin MD;  Location: [facility] OR;  Service: Cardiovascular;  Laterality: Left;    INCISION AND DRAINAGE Right 1964    knee    LEFT HEART CATHETERIZATION Left 9/28/2020    Procedure: Left heart cath;  Surgeon: Angela Dickens MD;  Location: [facility] CATH/EP LAB;  Service: Cardiology;  Laterality: Left;    MANDIBLE FRACTURE SURGERY Left 1995    wires in jaw    TRANSESOPHAGEAL ECHOCARDIOGRAPHY      x3     Social History     Tobacco Use    Smoking status: Former     Current packs/day: 0.00     Average packs/day: 0.5 packs/day for 45.0 years (22.5 ttl pk-yrs)     Types: Cigarettes     Start date: 9/5/1975     Quit date: 9/5/2020     Years since quitting: 3.8    Smokeless tobacco: Never    Tobacco comments:      1 pack per month   Substance Use Topics    Alcohol use: Not Currently     Comment: Social    Drug use: Not Currently     Types: Marijuana        Review of Systems:    As noted in HPI in addition      REVIEW OF SYSTEMS  CARDIOVASCULAR: No recent chest pain, palpitations, arm, neck, or jaw pain  RESPIRATORY: No recent fever, cough chills, SOB or congestion  : No blood in the urine  GI: No Nausea, vomiting, constipation, diarrhea, blood, or reflux.  MUSCULOSKELETAL: No myalgias  NEURO: No lightheadedness or dizziness  He does suffer from back issues periodically.  EYES: No Double vision, blurry, vision or headache              Objective        Vitals:    07/03/24 1317   BP: 128/70   Pulse: 66   Resp: 16       LIPIDS - LAST 2   Lab Results   Component Value Date    CHOL 136 06/22/2021    HDL 26 (L) 06/22/2021    LDLCALC 77.2 06/22/2021    TRIG 164 (H) 06/22/2021    CHOLHDL 19.1 (L) 06/22/2021       CBC - LAST 2  Lab Results   Component Value Date    WBC 7.98 06/22/2021    WBC 8.30 11/19/2020    RBC 4.75 06/22/2021    RBC 4.22 (L) 11/19/2020    HGB 15.3 06/22/2021    HGB 12.9 (L) 11/19/2020    HCT 45.2 06/22/2021    HCT 40.4 11/19/2020    MCV 95 06/22/2021    MCV 96 11/19/2020    MCH 32.2 (H) 06/22/2021    MCH 30.6 11/19/2020    MCHC 33.8 06/22/2021    MCHC 31.9 (L) 11/19/2020    RDW 12.0 06/22/2021    RDW 12.6 11/19/2020     06/22/2021     11/19/2020    MPV 10.0 06/22/2021    MPV 10.8 11/19/2020    GRAN 4.5 06/22/2021    GRAN 56.2 06/22/2021    LYMPH 2.4 06/22/2021    LYMPH 29.7 06/22/2021    MONO 0.8 06/22/2021    MONO 10.2 06/22/2021    BASO 0.04 06/22/2021    BASO 0.02 10/09/2020    NRBC 0 06/22/2021    NRBC 0 10/09/2020       CHEMISTRY & LIVER FUNCTION - LAST 2  Lab Results   Component Value Date     (L) 06/22/2021     11/19/2020    K 4.2 06/22/2021    K 4.1 11/19/2020    CL 99 06/22/2021     11/19/2020    CO2 28 06/22/2021    CO2 27 11/19/2020    ANIONGAP 7 (L) 06/22/2021     "ANIONGAP 12 11/19/2020    BUN 18 06/22/2021    BUN 13 11/19/2020    CREATININE 1.0 06/22/2021    CREATININE 0.9 11/19/2020     (H) 06/22/2021     (H) 11/19/2020    CALCIUM 9.2 06/22/2021    CALCIUM 9.5 11/19/2020    PH 7.342 (L) 10/06/2020    PH 7.317 (L) 10/06/2020    MG 1.9 11/19/2020    MG 2.0 10/07/2020    ALBUMIN 4.2 06/22/2021    ALBUMIN 4.1 11/19/2020    PROT 7.8 06/22/2021    PROT 7.6 11/19/2020    ALKPHOS 88 06/22/2021    ALKPHOS 82 11/19/2020    ALT 80 (H) 06/22/2021    ALT 30 11/19/2020    AST 74 (H) 06/22/2021    AST 28 11/19/2020    BILITOT 1.0 06/22/2021    BILITOT 0.5 11/19/2020        CARDIAC PROFILE - LAST 2  Lab Results   Component Value Date    TROPONINI <0.030 09/30/2020    TROPONINI <0.030 09/29/2020        COAGULATION - LAST 2  Lab Results   Component Value Date    LABPT 17.4 (H) 10/06/2020    LABPT 13.6 10/06/2020    INR 1.5 10/06/2020    INR 1.1 10/06/2020    APTT 30.3 10/06/2020    APTT 28.5 10/06/2020       ENDOCRINE & PSA - LAST 2  No results found for: "HGBA1C", "MICROALBUR", "TSH", "PROCAL", "PSA"     ECHOCARDIOGRAM RESULTS  Results for orders placed during the hospital encounter of 11/17/23    Echo    Interpretation Summary    Left Ventricle: The left ventricle is normal in size. Mildly increased wall thickness. There is mild concentric hypertrophy. Normal wall motion. There is normal systolic function with a visually estimated ejection fraction of 60 - 65%. There is normal diastolic function.    Right Ventricle: Normal right ventricular cavity size. Wall thickness is normal. Right ventricle wall motion  is normal. Systolic function is normal.    Left Atrium: Left atrium is moderately dilated.    Right Atrium: Right atrium is mildly dilated.    Aortic Valve: The aortic valve is a trileaflet valve. There is mild aortic valve sclerosis.    IVC/SVC: Normal venous pressure at 3 mmHg.      CURRENT/PREVIOUS VISIT EKG  Results for orders placed or performed in visit on 05/22/24 "   IN OFFICE EKG 12-LEAD (to [place])    Collection Time: 05/22/24  9:12 AM   Result Value Ref Range    QRS Duration 148 ms    [facility] QTC Calculation 414 ms    Narrative    Test Reason : Z95.1,    Vent. Rate : 061 BPM     Atrial Rate : 061 BPM     P-R Int : 186 ms          QRS Dur : 148 ms      QT Int : 412 ms       P-R-T Axes : 024 -06 033 degrees     QTc Int : 414 ms    Normal sinus rhythm  Right bundle branch block  Septal infarct ,age undetermined  Abnormal ECG  Confirmed by Isabel Vargas MD ([id number]) on 5/29/2024 10:57:56 PM    Referred By:             Confirmed By:Isabel Vargas MD     No valid procedures specified.   Results for orders placed during the hospital encounter of 11/17/23    Nuclear Stress - Cardiology Interpreted    Interpretation Summary    Abnormal myocardial perfusion scan.  No significant reversible ischemia seen    There are two significant perfusion abnormalities.    Perfusion Abnormality #1 - There is a moderate to severe intensity, moderate sized, equivocal perfusion abnormality that is fixed in the basal to apical inferior wall(s). This finding is equivocal due to diaphragm shadow.    Perfusion Abnormality #2 - There is a small sized, moderate intensity, equivocal perfusion abnormality that is fixed in the mid anteroapical wall(s). This finding is equivocal due to soft tissue shadow.    There are no other significant perfusion abnormalities.    There is a  moderate to severe intensity fixed perfusion abnormality in the inferior wall of the left ventricle secondary to diaphragm attenuation.    There is a mild to moderate intensity fixed perfusion abnormality in the anteroapical wall of the left ventricle, secondary to soft tissue attenuation.    The gated perfusion images showed an ejection fraction of 65% at rest. The gated perfusion images showed an ejection fraction of 71% post stress. Normal ejection fraction is greater than 53%.    There is normal wall motion at rest and post  "stress.    LV cavity size is normal at rest and normal at stress.    The ECG portion of the study is negative for ischemia.    The patient reported no chest pain during the stress test.    During stress, occasional PVCs are noted.    No valid procedures specified.    PHYSICAL EXAM  CONSTITUTIONAL: Well built, well nourished in no apparent distress  NECK: no carotid bruit, no JVD  LUNGS: CTA  CHEST WALL: no tenderness  HEART: regular rate and rhythm, S1, S2 normal, no murmur, click, rub or gallop   ABDOMEN: soft, non-tender; bowel sounds normal; no masses,  no organomegaly  EXTREMITIES: Extremities normal, no edema, no calf tenderness noted  NEURO: AAO X 3    I HAVE REVIEWED :    The vital signs, nurses notes, and all the pertinent radiology and labs.        Current Outpatient Medications   Medication Instructions    amLODIPine (NORVASC) 5 mg, Oral, Daily    aspirin 81 mg, Oral, Daily    atorvastatin (LIPITOR) 80 mg, Oral, Nightly    clopidogreL (PLAVIX) 75 mg tablet TAKE 1 TABLET (75MG) BY MOUTH ONCE DAILY    ergocalciferol (ERGOCALCIFEROL) 50,000 Units, Oral, Every 7 days    JARDIANCE 10 mg, Oral    metFORMIN (GLUCOPHAGE) 500 mg, Oral, With breakfast    metoprolol succinate (TOPROL-XL) 50 mg, Oral, 2 times daily    nitroGLYCERIN (NITROSTAT) 0.4 mg, Oral, Daily PRN    omega 3-dha-epa-fish oil (FISH OIL) 100-160-1,000 mg Cap Fish Oil   300-1,000 mg 1 bid    pantoprazole (PROTONIX) 40 mg, Oral, Daily    ranolazine (RANEXA) 500 mg, Oral, 2 times daily    tamsulosin (FLOMAX) 0.4 mg, Daily          Assessment & Plan     1. S/P CABG x 5  Overview:  Operation: 1. Aorto-coronary bypass grafting x 5 vessels ( Saphenous Vein Graft to the Left Anterior Descending coronary artery, a Sequential style Saphenous Vein Graft to the Right Posterior Descending coronary artery and Right Inferior Ventricular branch, and a natural "Y" Sequential style Saphenous Vein Graft to the Ramus Intermedius coronary artery and the Distal Left " Circumflex coronary artery )                    2.  Endoscopic harvest of the left greater saphenous vein       Assessment & Plan:  Coronary artery bypass surgery x5 he is doing well with no recurrence of any anginal symptoms continue on risk factor modification maintain on dual antiplatelet therapy with aspirin and Plavix Ranexa 500 mg p.o. b.i.d. metoprolol succinate 50 mg p.o. b.i.d. as well.  He was on atorvastatin 80 mg a day goal is to keep his LDL cholesterol well controlled      2. Essential hypertension  Assessment & Plan:  Blood pressure is stable at 120 8/70 mm Hg continue on amlodipine 5 mg daily, metoprolol succinate 50 mg p.o. b.i.d., low-salt low-fat diet      3. Dyslipidemia  Assessment & Plan:  Dyslipidemia continue on atorvastatin as well as fish oil capsules he appears relatively stable on this.  Maintain the same regimen continue to maintain on fat restricted carbohydrate cholesterol restricted diet      4. 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 Diagnosed by [facility]. Very mobile.  Resolved on repeat transesophageal echocardiogram with no mobile component noted on 3/17/2020.     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  He appears hemodynamically stable continue on present regimen      Other orders  -     metoprolol succinate (TOPROL-XL) 50 MG 24 hr tablet; Take 1 tablet (50 mg total) by mouth 2 (two) times daily.  Dispense: 180 tablet; Refill: 3  -     amLODIPine (NORVASC) 5 MG tablet; Take 1 tablet (5 mg total) by mouth once daily.  Dispense: 90 tablet; Refill: 3  -     atorvastatin (LIPITOR) 80 MG tablet; Take 1 tablet (80 mg total) by mouth every evening.  Dispense: 90 tablet; Refill: 3          No follow-ups on file.

## 2024-07-03 NOTE — ASSESSMENT & PLAN NOTE
Coronary artery bypass surgery x5 he is doing well with no recurrence of any anginal symptoms continue on risk factor modification maintain on dual antiplatelet therapy with aspirin and Plavix Ranexa 500 mg p.o. b.i.d. metoprolol succinate 50 mg p.o. b.i.d. as well.  He was on atorvastatin 80 mg a day goal is to keep his LDL cholesterol well controlled

## 2024-07-11 RX ORDER — NITROGLYCERIN 0.4 MG/1
TABLET SUBLINGUAL
Qty: 90 TABLET | Refills: 0 | Status: SHIPPED | OUTPATIENT
Start: 2024-07-11

## 2024-08-01 DIAGNOSIS — M48.061 SPINAL STENOSIS, LUMBAR REGION, WITHOUT NEUROGENIC CLAUDICATION: Primary | ICD-10-CM

## 2024-08-13 ENCOUNTER — HOSPITAL ENCOUNTER (OUTPATIENT)
Dept: RADIOLOGY | Facility: HOSPITAL | Age: 64
Discharge: HOME | End: 2024-08-13
Attending: NURSE PRACTITIONER
Payer: MEDICAID

## 2024-08-13 DIAGNOSIS — M48.061 SPINAL STENOSIS, LUMBAR REGION, WITHOUT NEUROGENIC CLAUDICATION: ICD-10-CM

## 2024-08-13 PROCEDURE — 72158 MRI LUMBAR SPINE W/O & W/DYE: CPT | Mod: TC

## 2024-08-13 PROCEDURE — A9585 GADOBUTROL INJECTION: HCPCS | Performed by: NURSE PRACTITIONER

## 2024-08-13 PROCEDURE — 72158 MRI LUMBAR SPINE W/O & W/DYE: CPT | Mod: 26,,, | Performed by: RADIOLOGY

## 2024-08-13 PROCEDURE — 25500020 PHARM REV CODE 255: Performed by: NURSE PRACTITIONER

## 2024-08-13 RX ORDER — GADOBUTROL 604.72 MG/ML
7.5 INJECTION INTRAVENOUS
Status: COMPLETED | OUTPATIENT
Start: 2024-08-13 | End: 2024-08-13

## 2024-08-13 RX ADMIN — GADOBUTROL 7.5 ML: 604.72 INJECTION INTRAVENOUS at 02:08

## 2024-10-21 RX ORDER — RANOLAZINE 500 MG/1
500 TABLET, EXTENDED RELEASE ORAL 2 TIMES DAILY
Qty: 180 TABLET | Refills: 3 | Status: SHIPPED | OUTPATIENT
Start: 2024-10-21

## 2025-05-06 ENCOUNTER — OFFICE VISIT (OUTPATIENT)
Dept: URGENT CARE | Facility: CLINIC | Age: 65
End: 2025-05-06
Payer: MEDICAID

## 2025-05-06 VITALS
TEMPERATURE: 98 F | BODY MASS INDEX: 25.46 KG/M2 | WEIGHT: 168 LBS | HEART RATE: 66 BPM | HEIGHT: 68 IN | RESPIRATION RATE: 16 BRPM | SYSTOLIC BLOOD PRESSURE: 168 MMHG | OXYGEN SATURATION: 99 % | DIASTOLIC BLOOD PRESSURE: 72 MMHG

## 2025-05-06 DIAGNOSIS — K11.20 SIALADENITIS: Primary | ICD-10-CM

## 2025-05-06 RX ORDER — CEPHALEXIN 500 MG/1
500 CAPSULE ORAL EVERY 6 HOURS
Qty: 28 CAPSULE | Refills: 0 | Status: SHIPPED | OUTPATIENT
Start: 2025-05-06 | End: 2025-05-13

## 2025-05-06 NOTE — PROGRESS NOTES
"Subjective:      Patient ID: Louie Wahl is a 64 y.o. male.    Vitals:  height is 5' 8" (1.727 m) and weight is 76.2 kg (168 lb). His temperature is 97.8 °F (36.6 °C). His blood pressure is 168/72 (abnormal) and his pulse is 66. His respiration is 16 and oxygen saturation is 99%.     Chief Complaint: Facial Swelling    Louie Wahl is a 64 year old male presenting to the clinic with c/o swelling to the left side of his face. Symptoms began today and worsens when he eats. He denies fever or recent upper respiratory infection.         Constitution: Negative.   HENT:  Positive for facial swelling.    Neck: neck negative.   Respiratory: Negative.     Gastrointestinal: Negative.    Musculoskeletal: Negative.    Skin: Negative.    Neurological: Negative.       Objective:     Physical Exam   Constitutional: He is oriented to person, place, and time. He appears well-developed. He is cooperative.  Non-toxic appearance. He does not appear ill. No distress.   HENT:   Head: Normocephalic and atraumatic.       Ears:   Right Ear: Hearing, tympanic membrane, external ear and ear canal normal.   Left Ear: Hearing, tympanic membrane, external ear and ear canal normal.   Nose: Nose normal. No mucosal edema, rhinorrhea or nasal deformity. No epistaxis. Right sinus exhibits no maxillary sinus tenderness and no frontal sinus tenderness. Left sinus exhibits no maxillary sinus tenderness and no frontal sinus tenderness.   Mouth/Throat: Uvula is midline, oropharynx is clear and moist and mucous membranes are normal. No trismus in the jaw. Normal dentition. No uvula swelling. No oropharyngeal exudate, posterior oropharyngeal edema or posterior oropharyngeal erythema.   No drainage from Stensen's or Nain's ducts      Comments: No drainage from Stensen's or [place]'s ducts  Eyes: Conjunctivae and lids are normal. No scleral icterus.   Neck: Trachea normal and phonation normal. Neck supple. No edema present. No erythema present. No " neck rigidity present.   Cardiovascular: Normal rate, regular rhythm, normal heart sounds and normal pulses.   Pulmonary/Chest: Effort normal and breath sounds normal. No respiratory distress. He has no decreased breath sounds. He has no rhonchi.   Abdominal: Normal appearance.   Musculoskeletal: Normal range of motion.         General: No deformity. Normal range of motion.   Neurological: He is alert and oriented to person, place, and time. He exhibits normal muscle tone. Coordination normal.   Skin: Skin is warm, dry, intact, not diaphoretic and not pale.   Psychiatric: His speech is normal and behavior is normal. Judgment and thought content normal.   Nursing note and vitals reviewed.      Assessment:     1. Sialadenitis        Plan:   Symptoms most consistent with sialadenitis. Facial swelling increases with eating/drinking. There is not tenderness to palpation of the parotid gland and no drainage from stensen's or shanel's ducts. Discussed use of heat and sour drinks/candies. If no improvement in 24-48 hours, start Keflex and follow up with PCP. Instructed to go to ED for any worsening symptoms.     Sialadenitis    Other orders  -     cephALEXin (KEFLEX) 500 MG capsule; Take 1 capsule (500 mg total) by mouth every 6 (six) hours. for 7 days  Dispense: 28 capsule; Refill: 0

## 2025-06-20 ENCOUNTER — TELEPHONE (OUTPATIENT)
Dept: NEUROSURGERY | Facility: CLINIC | Age: 65
End: 2025-06-20
Payer: MEDICAID

## 2025-06-20 NOTE — TELEPHONE ENCOUNTER
Returned pt call, informed him that unfortunately this nurse is unable schedule his current coverage for new patients at this time. Advised pt reach out to office he is already established with.

## 2025-06-20 NOTE — TELEPHONE ENCOUNTER
----- Message from LEIGH ANN Garsia sent at 6/20/2025 10:54 AM CDT -----  Please inform pt that we are unable to schedule his insurance for new patients at this time and he should follow up with Neurosurgeon that he is already established with.  ----- Message -----  From: Lefort, Stacey M  Sent: 6/20/2025  10:22 AM CDT  To: Ronan Jay Staff    Pt is calling regarding a referral appt- the callback is  [phone]

## 2025-06-30 DIAGNOSIS — N40.1 BENIGN PROSTATIC HYPERPLASIA WITH LOWER URINARY TRACT SYMPTOMS: Primary | ICD-10-CM

## 2025-06-30 DIAGNOSIS — R35.0 FREQUENCY OF MICTURITION: ICD-10-CM

## 2025-07-07 ENCOUNTER — HOSPITAL ENCOUNTER (OUTPATIENT)
Dept: RADIOLOGY | Facility: HOSPITAL | Age: 65
Discharge: HOME | End: 2025-07-07
Attending: NURSE PRACTITIONER
Payer: MEDICAID

## 2025-07-07 DIAGNOSIS — N40.1 BENIGN PROSTATIC HYPERPLASIA WITH LOWER URINARY TRACT SYMPTOMS: ICD-10-CM

## 2025-07-07 DIAGNOSIS — R35.0 FREQUENCY OF MICTURITION: ICD-10-CM

## 2025-07-07 PROCEDURE — 76857 US EXAM PELVIC LIMITED: CPT | Mod: 26,,, | Performed by: RADIOLOGY

## 2025-07-07 PROCEDURE — 76857 US EXAM PELVIC LIMITED: CPT | Mod: TC

## (undated) DEVICE — SUTURE VICRYL #0 36 VCP946H

## (undated) DEVICE — PACK PERFUSION

## (undated) DEVICE — COVER LIGHT HANDLE LB53

## (undated) DEVICE — TUBING PERFUSION 1/4 020471101

## (undated) DEVICE — INSERT FOGARTY 86 SOFT86

## (undated) DEVICE — COVER XRAY 24.5X24IN

## (undated) DEVICE — TRAY HEART BREAUX SMHS014-08

## (undated) DEVICE — PAD BOVIE ADULT

## (undated) DEVICE — PUNCH AORTIC 4.5  RCC-45

## (undated) DEVICE — STAPLER SKIN NON ROTATE PXW35

## (undated) DEVICE — CLIP APPLIER MSM20 STERILMED  ETHMSM20

## (undated) DEVICE — SUTURE SILK 2-0 SH 18 CR C012D

## (undated) DEVICE — SUTURE VICRYL 1 CTX 36 J977H

## (undated) DEVICE — DRAIN CHEST OCEAN WATER SEAL 2050-000

## (undated) DEVICE — CLIP APPLIER MCS20 STERILMED ETHMCS20

## (undated) DEVICE — SUTURE VICRYL 4-0 27 VCP935H

## (undated) DEVICE — DEGREASER SKIN MS-4000

## (undated) DEVICE — ADHESIVE MASTISOL VIAL 0523-48

## (undated) DEVICE — CONNECTOR STRAIGHT 3/8 050506000

## (undated) DEVICE — BLANKET HYPOTHERMIA LARGE

## (undated) DEVICE — CANNULA ARTERIOTOMY 31002

## (undated) DEVICE — TUBING SUCTION FRESENIUS 9108484

## (undated) DEVICE — SUTURE PROLENE 5-0 36 C-1 8720H

## (undated) DEVICE — SUTURE SILK 2 MH 30 K846H

## (undated) DEVICE — TUBING INSUFFLATION 10FT

## (undated) DEVICE — SUTURE PROLENE 6-0 C-1 30 8706H

## (undated) DEVICE — DRAPE SLUSH 66X44 ORS-330

## (undated) DEVICE — SPONGE KERLIX SUPER   NON25854

## (undated) DEVICE — BAG DECANTER 10-102

## (undated) DEVICE — SUTURE VICRYL 2-0 CT-1 36 VCP945H

## (undated) DEVICE — FILTER SUMP MEDISTINAL 28FR 111169AT

## (undated) DEVICE — HEMOCONCENTRATOR HPH1000TS

## (undated) DEVICE — CATHETER IV JELCO 14G X 2IN

## (undated) DEVICE — BRUSH EZ SCRUB W/HIBICLEANS 371073

## (undated) DEVICE — SOLUTION PREP IODINE 4OZ

## (undated) DEVICE — SPONGE GAUZE 10S 4X4  442214

## (undated) DEVICE — SUTURE VICRYL 3-0 UNDYED CT-1  J944H

## (undated) DEVICE — CONNECTOR 5-IN-1 CON2001

## (undated) DEVICE — CANNULA VENOUS 33/43FR TF33430

## (undated) DEVICE — DRESSING POST OP MEPILEX  AG 4X10

## (undated) DEVICE — TOWEL OR WHITE

## (undated) DEVICE — CATHETER RADIAL TIG 4.0 OPTITORQUE 5X110

## (undated) DEVICE — ALCOHOL 16OZ

## (undated) DEVICE — SET AUTOTRANSFUSION FRESENIUS 9005104

## (undated) DEVICE — CANN. AORTIC EOPA  20FR 78320  EUBANKS

## (undated) DEVICE — SUTURE STEEL 6 18 M654G

## (undated) DEVICE — GLOVE BIOGEL PI ULTRA TOUCH GRAY SZ7.5

## (undated) DEVICE — HEMOSTAT SURGICEL 4X8

## (undated) DEVICE — SYSTEM VEIN HARVESTING VSP550

## (undated) DEVICE — SUTURE PROLENE 6-0 C-1 30 M8706

## (undated) DEVICE — RESERVOIR FRESENIUS 9108474

## (undated) DEVICE — SHEATH INTRODUCER SLENDER 6FX10CM

## (undated) DEVICE — STERISTRIP 1/2 R1547

## (undated) DEVICE — DRESSING TEGADERM 4X4 3/4 TD1004

## (undated) DEVICE — DRESSING MEPORE 2.5 X 3   670800

## (undated) DEVICE — GUIDEWIRE DOUBLE ENDED .035 DIA. 150CML

## (undated) DEVICE — SUTURE PROLENE 7-0 BV-1 24 M8702

## (undated) DEVICE — SOLUTION ANTIFOG FOG1001

## (undated) DEVICE — TRAY HEART DRAPE BREAUX SMHS013-07

## (undated) DEVICE — BLADE BEAVER #69  AVER6900

## (undated) DEVICE — HEMOSTAT VASC BAND REG 24CM

## (undated) DEVICE — SYRINGE BULB ASEPTO 60CC 2OZ